# Patient Record
Sex: MALE | Race: WHITE | Employment: OTHER | URBAN - METROPOLITAN AREA
[De-identification: names, ages, dates, MRNs, and addresses within clinical notes are randomized per-mention and may not be internally consistent; named-entity substitution may affect disease eponyms.]

---

## 2017-01-16 ENCOUNTER — HOSPITAL ENCOUNTER (OUTPATIENT)
Dept: CT IMAGING | Age: 67
Discharge: HOME | End: 2017-01-16
Attending: FAMILY MEDICINE
Payer: SELF-PAY

## 2017-01-16 DIAGNOSIS — E78.2 MIXED HYPERLIPIDEMIA: ICD-10-CM

## 2017-01-16 PROCEDURE — 75571 CT HRT W/O DYE W/CA TEST: CPT

## 2018-01-19 PROBLEM — D68.51 FACTOR 5 LEIDEN MUTATION, HETEROZYGOUS (HCC): Status: ACTIVE | Noted: 2018-01-19

## 2018-10-20 ENCOUNTER — HOSPITAL ENCOUNTER (EMERGENCY)
Age: 68
Discharge: HOME | End: 2018-10-20
Attending: EMERGENCY MEDICINE
Payer: MEDICARE

## 2018-10-20 ENCOUNTER — APPOINTMENT (OUTPATIENT)
Dept: ULTRASOUND IMAGING | Age: 68
End: 2018-10-20
Attending: EMERGENCY MEDICINE
Payer: MEDICARE

## 2018-10-20 VITALS
HEIGHT: 72 IN | DIASTOLIC BLOOD PRESSURE: 82 MMHG | SYSTOLIC BLOOD PRESSURE: 143 MMHG | OXYGEN SATURATION: 94 % | WEIGHT: 301.3 LBS | HEART RATE: 100 BPM | TEMPERATURE: 97.5 F | RESPIRATION RATE: 16 BRPM | BODY MASS INDEX: 40.81 KG/M2

## 2018-10-20 DIAGNOSIS — I82.411 ACUTE DEEP VEIN THROMBOSIS (DVT) OF FEMORAL VEIN OF RIGHT LOWER EXTREMITY (HCC): Primary | ICD-10-CM

## 2018-10-20 PROCEDURE — 93971 EXTREMITY STUDY: CPT

## 2018-10-20 PROCEDURE — 99283 EMERGENCY DEPT VISIT LOW MDM: CPT | Performed by: EMERGENCY MEDICINE

## 2018-10-20 PROCEDURE — 74011250637 HC RX REV CODE- 250/637: Performed by: EMERGENCY MEDICINE

## 2018-10-20 RX ORDER — HYDROCODONE BITARTRATE AND ACETAMINOPHEN 5; 325 MG/1; MG/1
1 TABLET ORAL
Qty: 12 TAB | Refills: 0 | Status: SHIPPED | OUTPATIENT
Start: 2018-10-20 | End: 2019-02-07

## 2018-10-20 RX ADMIN — RIVAROXABAN 15 MG: 15 TABLET, FILM COATED ORAL at 14:54

## 2018-10-20 NOTE — ED NOTES
I have reviewed discharge instructions with the patient. The patient verbalized understanding. Patient left ED via Discharge Method: ambulatory to Home with self. Opportunity for questions and clarification provided. Patient given 2 scripts. To continue your aftercare when you leave the hospital, you may receive an automated call from our care team to check in on how you are doing. This is a free service and part of our promise to provide the best care and service to meet your aftercare needs.  If you have questions, or wish to unsubscribe from this service please call [phone]. Thank you for Choosing our [facility] Emergency Department.

## 2018-10-20 NOTE — ED PROVIDER NOTES
HPI: 
76 M, here with right leg swelling and pain since Thursday. Leiden factor V. Had DVT after surgery in 2002. No anticoagulant at this time. No fall. No trauma. No fever. No abdominal pain, back pain, chest pain, shortness of breath, cough, hemoptysis. ROS Constitutional: No fever, no chills Skin: no rash Eye: No vision changes ENMT: No sore throat Respiratory: No shortness of breath, no cough Cardiovascular: No chest pain, no palpitations Gastrointestinal: No vomiting, no nausea, no diarrhea, no abdominal pain : No dysuria MSK: No back pain, no joint pain Neuro: No headache, no change in mental status, no numbness, no tingling, no weakness All other review of systems positive per history of present illness and the above otherwise negative or noncontributory. Visit Vitals /77 (BP 1 Location: Left arm, BP Patient Position: At rest;Sitting) Pulse (!) 110 Temp 97.5 °F (36.4 °C) Resp 16 Ht 6' (1.829 m) Wt 136.7 kg (301 lb 4.8 oz) SpO2 96% BMI 40.86 kg/m² Past Medical History:  
Diagnosis Date  Allergic sinusitis  Cholelithiasis  CKD (chronic kidney disease)  Degenerative disc disease, cervical   
 Diabetes mellitus type II   
 Elevated PSA  GERD (gastroesophageal reflux disease)  History of prostate cancer 2002  Hx of malignant neoplasm of soft tissue 2002  Hx of syncope  Hyperlipidemia  Hypertension  Migraine  Multiple nevi  Obesity, morbid ([address]  Personal history of colonic polyps 2015  
 adenomas  Thromboembolus ([address] right leg 2002  Ulnar nerve compression Past Surgical History:  
Procedure Laterality Date  HX COLONOSCOPY  last 3/24/15 [facility]--two asc TA, three desc hyperplastic, one sigmoid TA--3 year recall  HX LAP CHOLECYSTECTOMY  1980's  HX MALIGNANT SKIN LESION EXCISION  2002  
 sarcoma [address] Relocate nerve in right elbow  HX PROSTATECTOMY  2002  
 not removed, radiation seeds Prior to Admission Medications Prescriptions Last Dose Informant Patient Reported? Taking? Cholecalciferol, Vitamin D3, 2,000 unit cap   Yes No  
Sig: Take 2 Caps by mouth daily. SITagliptin (JANUVIA) 100 mg tablet   No No  
Sig: TAKE 1 TABLET DAILY  
VESICARE 5 mg tablet   Yes No  
Sig: TAKE 1 TABLET BY MOUTH EVERY DAY  
aspirin (ASPIRIN) 325 mg tablet   Yes No  
Sig: Take 325 mg by mouth daily. atorvastatin (LIPITOR) 40 mg tablet   No No  
Sig: TAKE 1 TABLET DAILY  
colesevelam (WELCHOL) 3.75 gram pwpk powder packet   No No  
Sig: MIX AND DRINK ONE PACKET DAILY  
dapagliflozin (FARXIGA) 10 mg tab tablet   No No  
Sig: TAKE 1 TABLET BY MOUTH EVERY DAY  
ezetimibe (ZETIA) 10 mg tablet   No No  
Sig: TAKE 1 TABLET DAILY  
fenofibric acid (TRILIPIX ER) 135 mg capsule   No No  
Sig: TAKE 1 CAPSULE DAILY  
fexofenadine (ALLEGRA) 180 mg tablet   Yes No  
Sig: Take  by mouth daily. glucosamine/chondr mejia A sod (OSTEO BI-FLEX PO)   Yes No  
Sig: Take 1 Tab by mouth daily. magnesium 100 mg cap   Yes No  
Sig: Take 500 mg by mouth daily. metFORMIN (GLUCOPHAGE) 500 mg tablet   No No  
Sig: TAKE 2 TABLETS TWICE A DAY  
mometasone (NASONEX) 50 mcg/actuation nasal spray   Yes No  
Sig: as directed. montelukast (SINGULAIR) 10 mg tablet   No No  
Sig: Take 1 Tab by mouth daily. niacin (NIASPAN) 1,000 mg Tb24 tab   No No  
Sig: TAKE 3 TABLETS AT SUPPER  
olmesartan (BENICAR) 40 mg tablet   No No  
Sig: Take 1 Tab by mouth daily for 90 days. TAKE 1 TABLET DAILY  
omega-3 fatty acids-vitamin e (FISH OIL) 1,000 mg cap   Yes No  
Sig: Take 3 Caps by mouth. omeprazole (PRILOSEC) 20 mg capsule   No No  
Sig: Take 1 Cap by mouth daily for 90 days. Facility-Administered Medications: None Adult Exam  
General: alert, no acute distress Head: normocephalic, atraumatic ENT: moist mucous membranes Neck: supple, non-tender; full range of motion Cardiovascular: regular rate and rhythm, normal peripheral perfusion, no edema Respiratory:  normal respirations; no wheezing, rales or rhonchi Gastrointestinal: soft, non-tender; no rebound or guarding, no peritoneal signs, no distension Back: non-tender, full range of motion Musculoskeletal: normal range of motion, normal strength, no gross deformities RT LE - equal distal pulses. No cellulitis. + edema in RT > LT. Tenderness to palpation at the RT medial thigh. Neurological: alert and oriented x 4, no gross focal deficits; normal speech Psychiatric: cooperative; appropriate mood and affect MDM: Will obtain US of LE for evaluation of DVT. No chest pain or shortness of breath. Low susp for PE. Abdomen soft and non-tender. No hernia palpated. 2:49 PM 
+ DVT in RT LE. Unprovoked. Spoke with Dr. Cat Mojica about case. Need to see patient in office for further work up. Will start on Xarelto 15 mg BID x 21 days. He has a Heme/onc that he would like to see instead. He will call them on Monday. I will also give info to Dr. Ozzie Cogan office. Stable for discharge. Minimal pain at this time. Duplex Lower Ext Venous Right Result Date: 10/20/2018 RIGHT LOWER EXTREMITY DOPPLER ULTRASOUND 10/20/2018 HISTORY:    Leg swelling, pain, DVT suspected; prior DVT 2002. Not on anticoagulant  ; 3 days of right leg swelling and pain Technique: Sonographic imaging of the deep veins of the left leg was performed FINDINGS: Occlusive deep venous thrombus is present in the right common femoral vein and right femoral vein. Nonocclusive deep venous thrombus is present in the right popliteal vein. Occlusive DVT extends into the right posterior tibial veins and peroneal veins. IMPRESSION: Extensive right lower extremity DVT. Dragon voice recognition software was used to create this note.  Although the note has been reviewed and corrected where necessary, additional errors may have been overlooked and remain in the text.

## 2019-02-13 ENCOUNTER — HOSPITAL ENCOUNTER (OUTPATIENT)
Dept: ULTRASOUND IMAGING | Age: 69
Discharge: HOME | End: 2019-02-13
Attending: INTERNAL MEDICINE
Payer: MEDICARE

## 2019-02-13 DIAGNOSIS — Z13.6 ENCOUNTER FOR ABDOMINAL AORTIC ANEURYSM (AAA) SCREENING: ICD-10-CM

## 2019-02-13 PROCEDURE — 93978 VASCULAR STUDY: CPT

## 2019-08-14 PROBLEM — D12.5 BENIGN NEOPLASM OF SIGMOID COLON: Status: ACTIVE | Noted: 2019-08-14

## 2019-08-14 PROBLEM — K21.00 GASTRO-ESOPHAGEAL REFLUX DISEASE WITH ESOPHAGITIS: Status: ACTIVE | Noted: 2019-08-14

## 2019-08-17 NOTE — H&P (VIEW-ONLY)
aDate: 2019      Name: Debra Patel      MRN: [id number]       : 1950       Age: 76 y.o. Sex: male            CC: No chief complaint on file. HPI:     Debra Patel is a 76 y.o. male who presents for evaluation of a soft tissue mass as an urgent referral from Dr. Hair Mejia. The patient has a soft tissue mass of the back which has \"flared up. \" Dr. Hair Mejia started him on Cipro when she saw him on 8/15/19. The patient has a factor V Leiden mutation for which he is on Xarelto. The lesion on the left upper back has been present for \"3-4 weeks. \" It is draining and \"messy. \" It is painful. This is the fourth recurrence of similar lesions in the same area. Previously the lesions have been excised in a surgeon's office. No fever or chills. PMH:    Past Medical History:   Diagnosis Date    Allergic sinusitis     Cholelithiasis     CKD (chronic kidney disease)     Degenerative disc disease, cervical     Diabetes mellitus type II     Elevated PSA     GERD (gastroesophageal reflux disease)     History of prostate cancer 2002    Hx of malignant neoplasm of soft tissue 2002    Hx of syncope     Hyperlipidemia     Hypertension     Migraine     Multiple nevi     Obesity, morbid ([address]     Personal history of colonic polyps     adenomas    [facility]     right leg 2002    Ulnar nerve compression        PSH:    Past Surgical History:   Procedure Laterality Date    HX COLONOSCOPY  last 3/24/15    [facility]--two asc TA, three desc hyperplastic, one sigmoid TA--3 year recall    HX LAP CHOLECYSTECTOMY      HX MALIGNANT SKIN LESION EXCISION      sarcoma    HX ORTHOPAEDIC  1985    Relocate nerve in right elbow    HX PROSTATECTOMY  2002    not removed, radiation seeds       MEDS:    Current Outpatient Medications   Medication Sig    amLODIPine-valsartan (EXFORGE) 5-320 mg per tablet Take 1 Tab by mouth daily.  Magnesium Oxide 500 mg cap Take 1 Cap by mouth daily.     multivitamin (ONE A DAY) tablet take 1 tablet by oral route  every day with food    omeprazole (PRILOSEC) 40 mg capsule Take 1 Cap by mouth daily.  atorvastatin (LIPITOR) 40 mg tablet take 1 tablet by oral route  every day    cholecalciferol, vitamin D3, 4,000 unit cap Take 1 Cap by mouth daily.  [facility]) 3.75 gram pwpk powder packet Take  by mouth.  dapagliflozin (FARXIGA) 10 mg tab tablet take 1 tablet by oral route  every day in the morning    ezetimibe (ZETIA) 10 mg tablet take 1 tablet by oral route  every day    fenofibric acid (TRILIPIX) 135 mg capsule take 1 capsule by oral route  every day    fexofenadine (ALLEGRA) 180 mg tablet take 1 tablet by oral route  every day as needed    montelukast (SINGULAIR) 10 mg tablet take 1 tablet by oral route  every day in the evening as needed    niacin 1,000 mg TbER take 1 tablet by oral route 3 times every day    SITagliptin (JANUVIA) 100 mg tablet take 1 tablet by oral route  every day    XARELTO 20 mg tab tablet Take 1 tablet by oral route every day with the evening meal    omega 3-DHA-EPA-fish oil (FISH OIL) 1,000 mg (120 mg-180 mg) capsule Take 3 Caps by mouth daily.  ciprofloxacin HCl (CIPRO) 500 mg tablet Take 1 Tab by mouth two (2) times a day for 7 days.  metFORMIN (GLUCOPHAGE) 500 mg tablet TAKE 2 TABLETS TWICE A DAY    VESICARE 5 mg tablet TAKE 1 TABLET BY MOUTH EVERY DAY    glucosamine/chondr mejia A sod (OSTEO BI-FLEX PO) Take 1 Tab by mouth daily.  aspirin (ASPIRIN) 325 mg tablet Take 325 mg by mouth daily. No current facility-administered medications for this visit.         ALLERGIES:      No Known Allergies    SH:    Social History     Tobacco Use    Smoking status: Former Smoker     Packs/day: 1.00     Years: 10.00     Pack years: 10.00     Last attempt to quit: 1984     Years since quittin.3    Smokeless tobacco: Never Used   Substance Use Topics    Alcohol use: Yes     Comment: rarely    Drug use: No       FH:    Family History   Problem Relation Age of Onset    Cancer Father         lung    Heart Attack Mother     Heart Disease Mother     Cancer Paternal Grandmother     Cancer Paternal Grandfather     Lung Disease Paternal Grandfather        ROS: The patient has no difficulty with chest pain or shortness of breath. No fever or chills. Comprehensive 13 point review of systems was otherwise unremarkable except as noted above. Physical Exam:     There were no vitals taken for this visit. General: Alert, oriented, obese white male in no acute distress. Eyes: Sclera are clear. Conjunctiva and lids within normal limits. No icterus. Ears and Nose: no gross deformities to visual inspection, gross hearing intact  Neck: Supple, trachea midline. No lymphadenopathy. Resp: Breathing is  non-labored. Lungs clear to auscultation without wheezing or rhonchi   Back: large, open, infected left upper back soft tissue mass. This is likely a ruptured, inflamed, infected epidermal inclusion cyst. There is some surrounding cellulitis. The lesion is draining sebaceous material. The diameter is approximately 10 cm's. CV: RRR. No murmurs, rubs or gallops appreciated. Abd: soft non-tender and non-distended without peritoneal signs. +bs    Psych:  Mood and affect appropriate. Short-term memory and understanding intact      Assessment/Plan:  Mildred Pittman is a 76 y.o. male who has signs and symptoms consistent with a soft tissue mass of the left upper back. 1.  Hold Xarelto. May continue  mg    2. Excision of a left upper back soft tissue mass in the operating room. 3.  I went through the risks of bleeding, infection, anesthesia, hematoma/seroma formation and possible recurrence of the lesion. I said it may be necessary to place a drain. It may be necessary to leave the wound open, if badly infected.     Solange Oquendo MD      [facility]   8/17/2019  11:42 AM

## 2019-08-21 ENCOUNTER — HOSPITAL ENCOUNTER (OUTPATIENT)
Dept: SURGERY | Age: 69
Discharge: HOME | End: 2019-08-21

## 2019-08-21 VITALS — WEIGHT: 310 LBS | BODY MASS INDEX: 41.99 KG/M2 | HEIGHT: 72 IN

## 2019-08-21 NOTE — PERIOP NOTES
Patient verified name and . Order for consent found in EHR and matches case posting; patient verifies procedure. Type 1B surgery, phone assessment complete. Orders received. Labs per surgeon: none  Labs per anesthesia protocol: none; CBC and CMP collected 19- results in EMR for anesthesia reference    Patient answered medical/surgical history questions at their best of ability. All prior to admission medications documented in [facility]. Patient instructed to take the following medications the day of surgery according to anesthesia guidelines with a small sip of water: asa 325mg, welchol, omeprazole, vesicare. Hold all vitamins 7 days prior to surgery and NSAIDS 5 days prior to surgery. Prescription meds to hold: exforge DOS, farxiga DOS, zetia DOS, trilipix DOS, [place] DOS, metformin DOS. Pt states surgeon instructed him to hold his xarelto prior to surgery but could remain taking his ASA. Patient instructed on the following:  Arrive at A Entrance, time of arrival to be called the day before by 1700  NPO after midnight including gum, mints, and ice chips  Responsible adult must drive patient to the hospital, stay during surgery, and patient will need supervision 24 hours after anesthesia  Use antibacterial soap in shower the night before surgery and on the morning of surgery  All piercings must be removed prior to arrival.    Leave all valuables (money and jewelry) at home but bring insurance card and ID on DOS. Do not wear make-up, nail polish, lotions, cologne, perfumes, powders, or oil on skin. Patient teach back successful and patient demonstrates knowledge of instruction.

## 2019-08-22 ENCOUNTER — ANESTHESIA EVENT (OUTPATIENT)
Dept: SURGERY | Age: 69
End: 2019-08-22
Payer: MEDICARE

## 2019-08-23 ENCOUNTER — HOSPITAL ENCOUNTER (OUTPATIENT)
Age: 69
Setting detail: OUTPATIENT SURGERY
Discharge: HOME | End: 2019-08-23
Attending: SURGERY | Admitting: SURGERY
Payer: MEDICARE

## 2019-08-23 ENCOUNTER — ANESTHESIA (OUTPATIENT)
Dept: SURGERY | Age: 69
End: 2019-08-23
Payer: MEDICARE

## 2019-08-23 VITALS
HEIGHT: 72 IN | SYSTOLIC BLOOD PRESSURE: 119 MMHG | DIASTOLIC BLOOD PRESSURE: 64 MMHG | RESPIRATION RATE: 18 BRPM | BODY MASS INDEX: 41.87 KG/M2 | HEART RATE: 99 BPM | OXYGEN SATURATION: 92 % | TEMPERATURE: 97.5 F | WEIGHT: 309.13 LBS

## 2019-08-23 DIAGNOSIS — M79.89 SOFT TISSUE MASS: Primary | ICD-10-CM

## 2019-08-23 LAB — GLUCOSE BLD STRIP.AUTO-MCNC: 212 MG/DL (ref 65–100)

## 2019-08-23 PROCEDURE — 74011250636 HC RX REV CODE- 250/636

## 2019-08-23 PROCEDURE — 74011250636 HC RX REV CODE- 250/636: Performed by: ANESTHESIOLOGY

## 2019-08-23 PROCEDURE — 77030018836 HC SOL IRR NACL ICUM -A: Performed by: SURGERY

## 2019-08-23 PROCEDURE — 76060000033 HC ANESTHESIA 1 TO 1.5 HR: Performed by: SURGERY

## 2019-08-23 PROCEDURE — 77030008462 HC STPLR SKN PROX J&J -A: Performed by: SURGERY

## 2019-08-23 PROCEDURE — 76010000161 HC OR TIME 1 TO 1.5 HR INTENSV-TIER 1: Performed by: SURGERY

## 2019-08-23 PROCEDURE — 76210000020 HC REC RM PH II FIRST 0.5 HR: Performed by: SURGERY

## 2019-08-23 PROCEDURE — 74011250636 HC RX REV CODE- 250/636: Performed by: SURGERY

## 2019-08-23 PROCEDURE — 88305 TISSUE EXAM BY PATHOLOGIST: CPT

## 2019-08-23 PROCEDURE — 82962 GLUCOSE BLOOD TEST: CPT

## 2019-08-23 PROCEDURE — 77030021678 HC GLIDESCP STAT DISP VERT -B: Performed by: ANESTHESIOLOGY

## 2019-08-23 PROCEDURE — 77030039425 HC BLD LARYNG TRULITE DISP TELE -A: Performed by: ANESTHESIOLOGY

## 2019-08-23 PROCEDURE — 74011000250 HC RX REV CODE- 250

## 2019-08-23 PROCEDURE — 77030012411 HC DRN WND CARD -A: Performed by: SURGERY

## 2019-08-23 PROCEDURE — 77030037088 HC TUBE ENDOTRACH ORAL NSL COVD-A: Performed by: ANESTHESIOLOGY

## 2019-08-23 PROCEDURE — 74011250637 HC RX REV CODE- 250/637: Performed by: ANESTHESIOLOGY

## 2019-08-23 PROCEDURE — 76210000063 HC OR PH I REC FIRST 0.5 HR: Performed by: SURGERY

## 2019-08-23 RX ORDER — SODIUM CHLORIDE 0.9 % (FLUSH) 0.9 %
5-40 SYRINGE (ML) INJECTION AS NEEDED
Status: DISCONTINUED | OUTPATIENT
Start: 2019-08-23 | End: 2019-08-23 | Stop reason: HOSPADM

## 2019-08-23 RX ORDER — OXYCODONE AND ACETAMINOPHEN 5; 325 MG/1; MG/1
1-2 TABLET ORAL
Qty: 30 TAB | Refills: 0 | Status: SHIPPED | OUTPATIENT
Start: 2019-08-23 | End: 2019-08-26

## 2019-08-23 RX ORDER — PROPOFOL 10 MG/ML
INJECTION, EMULSION INTRAVENOUS AS NEEDED
Status: DISCONTINUED | OUTPATIENT
Start: 2019-08-23 | End: 2019-08-23 | Stop reason: HOSPADM

## 2019-08-23 RX ORDER — DIPHENHYDRAMINE HYDROCHLORIDE 50 MG/ML
12.5 INJECTION, SOLUTION INTRAMUSCULAR; INTRAVENOUS ONCE
Status: DISCONTINUED | OUTPATIENT
Start: 2019-08-23 | End: 2019-08-23 | Stop reason: HOSPADM

## 2019-08-23 RX ORDER — HYDROMORPHONE HYDROCHLORIDE 2 MG/ML
0.5 INJECTION, SOLUTION INTRAMUSCULAR; INTRAVENOUS; SUBCUTANEOUS
Status: DISCONTINUED | OUTPATIENT
Start: 2019-08-23 | End: 2019-08-23 | Stop reason: HOSPADM

## 2019-08-23 RX ORDER — MIDAZOLAM HYDROCHLORIDE 1 MG/ML
2 INJECTION, SOLUTION INTRAMUSCULAR; INTRAVENOUS
Status: DISCONTINUED | OUTPATIENT
Start: 2019-08-23 | End: 2019-08-23 | Stop reason: HOSPADM

## 2019-08-23 RX ORDER — OXYCODONE HYDROCHLORIDE 5 MG/1
5 TABLET ORAL
Status: DISCONTINUED | OUTPATIENT
Start: 2019-08-23 | End: 2019-08-23 | Stop reason: HOSPADM

## 2019-08-23 RX ORDER — SODIUM CHLORIDE, SODIUM LACTATE, POTASSIUM CHLORIDE, CALCIUM CHLORIDE 600; 310; 30; 20 MG/100ML; MG/100ML; MG/100ML; MG/100ML
100 INJECTION, SOLUTION INTRAVENOUS CONTINUOUS
Status: DISCONTINUED | OUTPATIENT
Start: 2019-08-23 | End: 2019-08-23 | Stop reason: HOSPADM

## 2019-08-23 RX ORDER — ONDANSETRON 2 MG/ML
INJECTION INTRAMUSCULAR; INTRAVENOUS AS NEEDED
Status: DISCONTINUED | OUTPATIENT
Start: 2019-08-23 | End: 2019-08-23 | Stop reason: HOSPADM

## 2019-08-23 RX ORDER — OXYCODONE AND ACETAMINOPHEN 5; 325 MG/1; MG/1
1 TABLET ORAL AS NEEDED
Status: DISCONTINUED | OUTPATIENT
Start: 2019-08-23 | End: 2019-08-23 | Stop reason: HOSPADM

## 2019-08-23 RX ORDER — SODIUM CHLORIDE 9 MG/ML
50 INJECTION, SOLUTION INTRAVENOUS CONTINUOUS
Status: DISCONTINUED | OUTPATIENT
Start: 2019-08-23 | End: 2019-08-23 | Stop reason: HOSPADM

## 2019-08-23 RX ORDER — EPHEDRINE SULFATE 50 MG/ML
INJECTION, SOLUTION INTRAVENOUS AS NEEDED
Status: DISCONTINUED | OUTPATIENT
Start: 2019-08-23 | End: 2019-08-23 | Stop reason: HOSPADM

## 2019-08-23 RX ORDER — SULFAMETHOXAZOLE AND TRIMETHOPRIM 800; 160 MG/1; MG/1
1 TABLET ORAL 2 TIMES DAILY
Qty: 14 TAB | Refills: 0 | Status: SHIPPED | OUTPATIENT
Start: 2019-08-23 | End: 2019-08-29 | Stop reason: SDUPTHER

## 2019-08-23 RX ORDER — FENTANYL CITRATE 50 UG/ML
25 INJECTION, SOLUTION INTRAMUSCULAR; INTRAVENOUS ONCE
Status: DISCONTINUED | OUTPATIENT
Start: 2019-08-23 | End: 2019-08-23 | Stop reason: HOSPADM

## 2019-08-23 RX ORDER — MIDAZOLAM HYDROCHLORIDE 1 MG/ML
2 INJECTION, SOLUTION INTRAMUSCULAR; INTRAVENOUS ONCE
Status: DISCONTINUED | OUTPATIENT
Start: 2019-08-23 | End: 2019-08-23 | Stop reason: HOSPADM

## 2019-08-23 RX ORDER — LIDOCAINE HYDROCHLORIDE 20 MG/ML
INJECTION, SOLUTION EPIDURAL; INFILTRATION; INTRACAUDAL; PERINEURAL AS NEEDED
Status: DISCONTINUED | OUTPATIENT
Start: 2019-08-23 | End: 2019-08-23 | Stop reason: HOSPADM

## 2019-08-23 RX ORDER — NEOSTIGMINE METHYLSULFATE 1 MG/ML
INJECTION INTRAVENOUS AS NEEDED
Status: DISCONTINUED | OUTPATIENT
Start: 2019-08-23 | End: 2019-08-23 | Stop reason: HOSPADM

## 2019-08-23 RX ORDER — SODIUM CHLORIDE 0.9 % (FLUSH) 0.9 %
5-40 SYRINGE (ML) INJECTION EVERY 8 HOURS
Status: DISCONTINUED | OUTPATIENT
Start: 2019-08-23 | End: 2019-08-23 | Stop reason: HOSPADM

## 2019-08-23 RX ORDER — GLYCOPYRROLATE 0.2 MG/ML
INJECTION INTRAMUSCULAR; INTRAVENOUS AS NEEDED
Status: DISCONTINUED | OUTPATIENT
Start: 2019-08-23 | End: 2019-08-23 | Stop reason: HOSPADM

## 2019-08-23 RX ORDER — ROCURONIUM BROMIDE 10 MG/ML
INJECTION, SOLUTION INTRAVENOUS AS NEEDED
Status: DISCONTINUED | OUTPATIENT
Start: 2019-08-23 | End: 2019-08-23 | Stop reason: HOSPADM

## 2019-08-23 RX ORDER — SUCCINYLCHOLINE CHLORIDE 20 MG/ML
INJECTION INTRAMUSCULAR; INTRAVENOUS AS NEEDED
Status: DISCONTINUED | OUTPATIENT
Start: 2019-08-23 | End: 2019-08-23 | Stop reason: HOSPADM

## 2019-08-23 RX ORDER — FAMOTIDINE 20 MG/1
20 TABLET, FILM COATED ORAL ONCE
Status: COMPLETED | OUTPATIENT
Start: 2019-08-23 | End: 2019-08-23

## 2019-08-23 RX ORDER — FENTANYL CITRATE 50 UG/ML
INJECTION, SOLUTION INTRAMUSCULAR; INTRAVENOUS AS NEEDED
Status: DISCONTINUED | OUTPATIENT
Start: 2019-08-23 | End: 2019-08-23 | Stop reason: HOSPADM

## 2019-08-23 RX ORDER — LIDOCAINE HYDROCHLORIDE 10 MG/ML
0.1 INJECTION INFILTRATION; PERINEURAL AS NEEDED
Status: DISCONTINUED | OUTPATIENT
Start: 2019-08-23 | End: 2019-08-23 | Stop reason: HOSPADM

## 2019-08-23 RX ADMIN — FENTANYL CITRATE 50 MCG: 50 INJECTION, SOLUTION INTRAMUSCULAR; INTRAVENOUS at 11:11

## 2019-08-23 RX ADMIN — Medication 3 G: at 10:53

## 2019-08-23 RX ADMIN — ROCURONIUM BROMIDE 5 MG: 10 INJECTION, SOLUTION INTRAVENOUS at 11:02

## 2019-08-23 RX ADMIN — PROPOFOL 150 MG: 10 INJECTION, EMULSION INTRAVENOUS at 11:27

## 2019-08-23 RX ADMIN — FENTANYL CITRATE 50 MCG: 50 INJECTION, SOLUTION INTRAMUSCULAR; INTRAVENOUS at 11:38

## 2019-08-23 RX ADMIN — SUCCINYLCHOLINE CHLORIDE 200 MG: 20 INJECTION INTRAMUSCULAR; INTRAVENOUS at 11:02

## 2019-08-23 RX ADMIN — NEOSTIGMINE METHYLSULFATE 3 MG: 1 INJECTION INTRAVENOUS at 11:52

## 2019-08-23 RX ADMIN — SODIUM CHLORIDE, SODIUM LACTATE, POTASSIUM CHLORIDE, AND CALCIUM CHLORIDE: 600; 310; 30; 20 INJECTION, SOLUTION INTRAVENOUS at 12:00

## 2019-08-23 RX ADMIN — SODIUM CHLORIDE, SODIUM LACTATE, POTASSIUM CHLORIDE, AND CALCIUM CHLORIDE 100 ML/HR: 600; 310; 30; 20 INJECTION, SOLUTION INTRAVENOUS at 10:21

## 2019-08-23 RX ADMIN — GLYCOPYRROLATE 0.4 MG: 0.2 INJECTION INTRAMUSCULAR; INTRAVENOUS at 11:52

## 2019-08-23 RX ADMIN — OXYCODONE HYDROCHLORIDE AND ACETAMINOPHEN 1 TABLET: 5; 325 TABLET ORAL at 12:35

## 2019-08-23 RX ADMIN — EPHEDRINE SULFATE 10 MG: 50 INJECTION, SOLUTION INTRAVENOUS at 11:21

## 2019-08-23 RX ADMIN — ONDANSETRON 4 MG: 2 INJECTION INTRAMUSCULAR; INTRAVENOUS at 11:15

## 2019-08-23 RX ADMIN — FENTANYL CITRATE 50 MCG: 50 INJECTION, SOLUTION INTRAMUSCULAR; INTRAVENOUS at 11:02

## 2019-08-23 RX ADMIN — LIDOCAINE HYDROCHLORIDE 100 MG: 20 INJECTION, SOLUTION EPIDURAL; INFILTRATION; INTRACAUDAL; PERINEURAL at 11:02

## 2019-08-23 RX ADMIN — ROCURONIUM BROMIDE 20 MG: 10 INJECTION, SOLUTION INTRAVENOUS at 11:27

## 2019-08-23 RX ADMIN — FENTANYL CITRATE 50 MCG: 50 INJECTION, SOLUTION INTRAMUSCULAR; INTRAVENOUS at 11:27

## 2019-08-23 RX ADMIN — FAMOTIDINE 20 MG: 20 TABLET, FILM COATED ORAL at 10:20

## 2019-08-23 RX ADMIN — PROPOFOL 200 MG: 10 INJECTION, EMULSION INTRAVENOUS at 11:02

## 2019-08-23 NOTE — INTERVAL H&P NOTE
H&P Update:  Kinga Chiu was seen and examined. History and physical has been reviewed. The patient has been examined.  There have been no significant clinical changes since the completion of the originally dated History and Physical.

## 2019-08-23 NOTE — ANESTHESIA POSTPROCEDURE EVALUATION
Procedure(s):  EXCISION SOFT TISSUE MASS LEFT UPPER BACK.     general    Anesthesia Post Evaluation      Multimodal analgesia: multimodal analgesia used between 6 hours prior to anesthesia start to PACU discharge  Patient location during evaluation: bedside  Patient participation: complete - patient participated  Level of consciousness: awake  Pain management: adequate  Airway patency: patent  Anesthetic complications: no  Cardiovascular status: acceptable and stable  Respiratory status: acceptable and room air  Hydration status: acceptable  Post anesthesia nausea and vomiting:  none      Vitals Value Taken Time   /67 8/23/2019 12:52 PM   Temp 36.4 °C (97.5 °F) 8/23/2019 12:09 PM   Pulse 100 8/23/2019 12:52 PM   Resp 18 8/23/2019 12:52 PM   SpO2 96 % 8/23/2019 12:52 PM

## 2019-08-23 NOTE — ANESTHESIA PREPROCEDURE EVALUATION
Relevant Problems   No relevant active problems       Anesthetic History   No history of anesthetic complications            Review of Systems / Medical History  Patient summary reviewed and pertinent labs reviewed    Pulmonary  Within defined limits            Pertinent negatives: Smoker: ex.     Neuro/Psych         Headaches     Cardiovascular    Hypertension: well controlled          Hyperlipidemia  Pertinent negatives: No past MI  Exercise tolerance: >4 METS     GI/Hepatic/Renal     GERD: well controlled    Renal disease: CRI       Endo/Other    Diabetes: well controlled, type 2    Morbid obesity, arthritis and cancer (prostate)     Other Findings   Comments: DDD neck  DVT 2002 and 2018--on xarelto, none for 3 days           Physical Exam    Airway  Mallampati: II  TM Distance: 4 - 6 cm  Neck ROM: normal range of motion   Mouth opening: Normal    Comments: Heavily bearded Cardiovascular  Regular rate and rhythm,  S1 and S2 normal,  no murmur, click, rub, or gallop  Rhythm: regular  Rate: normal         Dental    Dentition: Caps/crowns  Comments: Temporary crown lower right   Pulmonary  Breath sounds clear to auscultation               Abdominal  Abdominal exam normal       Other Findings            Anesthetic Plan    ASA: 3  Anesthesia type: general          Induction: Intravenous  Anesthetic plan and risks discussed with: Patient

## 2019-08-23 NOTE — DISCHARGE INSTRUCTIONS
1. Diet as tolerated except for a  low fat diet after laparoscopic cholecystectomy. 2. Showering is allowed, but no tub baths, hot tubs or swimming. 3. Drainage is common from the wounds. Change the dressings as needed. Call our office if the wounds become reddened, tender, feel warm to the touch or pus starts to drain from the wound. 4. Take prescribed pain medication as directed, usually Percocet, Norco, Ultram or Dilaudid. Take over the counter medication for minor pain. 5. Ice may be applied intermittently to the surgical site or sites. 6. Call or office, [phone], if problems arise. 7. Follow up in the office at the assigned time. 8. Resume all medications as taken per surgery, unless specifically instructed not to take certain ones. 9. No lifting more than 25 pounds until told otherwise. 10. Driving is allowed 3 days after surgery as long as you feel comfortable enough to drive and have not taken any prescription pain medication prior to driving. 11. Leave packing in place until seen in the office on 8/26/19.    12. Resume Xarelto on 8/25/19.

## 2019-08-23 NOTE — BRIEF OP NOTE
BRIEF OPERATIVE NOTE    Date of Procedure: 8/23/2019   Preoperative Diagnosis: LARGE LEFT UPPER BACK SOFT TISSUE MASS AND MORBID OBESITY WITH A BMI OF 42  Postoperative Diagnosis: SAME WITH A VERY LARGE, INFECTED EPIDERMAL INCLUSION CYST FOUND. Procedure(s):  EXCISION  LEFT UPPER BACK SOFT TISSUE MASS MEASURING 10 CM X 6 CM X 6 CM  Surgeon(s) and Role:     Austin Owens MD - Primary         Surgical Assistant: None    Surgical Staff:  Circ-1: Uma Osullivan RN  Scrub Tech-1: Benny Alamo  Scrub Tech-2: Mukul MCNAMARA  Event Time In Time Out   Incision Start 1124    Incision Close       Anesthesia: General   Estimated Blood Loss: 50 cc's  Specimens:   ID Type Source Tests Collected by Time Destination   1 : soft tissue mass left upper back Preservative   Alejandro Chowdhury MD 8/23/2019 1129 Pathology      Findings: See dictated note. Wound too deep and infected to close primarily. Complications: None.   Implants: * No implants in log *

## 2019-08-23 NOTE — ADDENDUM NOTE
Addendum  created 08/23/19 1309 by Praveen Wallace MD    Attestation recorded in [facility] filed

## 2019-08-24 NOTE — OP NOTES
[address] [address]  OPERATIVE REPORT    Name:  Donnie Vee  MR#:  [id number]  :  1950  ACCOUNT #:  [de-identified]  DATE OF SERVICE:  2019    PREOPERATIVE DIAGNOSES:  1. Left upper back soft tissue mass. 2.  Morbid obesity with a body mass index of 42. POSTOPERATIVE DIAGNOSES:  1. Left upper back soft tissue mass. 2.  Morbid obesity with a body mass index of 42 with a large infected epidermal inclusion cyst encountered as left upper back soft tissue mass. PROCEDURE PERFORMED:  Excision of a left upper back soft tissue mass measuring 10 cm x 6 cm x 6 cm. SURGEON:  Olinda Rajan. Nia Cline MD    ASSISTANT:  None. ANESTHESIA:  General endotracheal anesthesia. COMPLICATIONS:   None. SPECIMENS REMOVED:  This large epidermal inclusion cyst was excised in two pieces and sent to Pathology for evaluation. IMPLANTS:  He had Betadine-soaked Kerlix placed in the wound as it was too large and infected to close primarily. ESTIMATED BLOOD LOSS:  50 mL. DRAINS:  None. HISTORY:  This is a 22-year-old male, who was referred by Dr. Lizzie Harmon for a large soft tissue mass in the left upper back region. The area was draining sebaceous material.  It was erythematous and tender to palpation. I recommended excision of this. I saw him originally on 2019, and would have done a surgery the following day, but he was on Xarelto and 325 mg aspirin secondary to DVT in his right lower extremity. I held the Xarelto and scheduled his surgery for the morning of 2019. I went over the risks of bleeding, infection, anesthesia, hematoma, seroma formation, potential recurrence of the lesion. I said it may be necessary to place a drain. The patient had asked about whether this would be packed or closed, I said we would attempt to close the wound, but it may be necessary to leave it open and let it heal by secondary intention either with wet-to-dry gauze dressings or wound VAC placement.   As it turned out, the wound was much too large and infected to close primarily. The patient was agreeable, signed a consent form and was scheduled for today. PROCEDURE:  The patient was seen in the preop area . I asked about whether he had held his Xarelto, he said his last dose was Tuesday morning before he saw me, he did not take it Wednesday, Thursday, or the morning of surgery. He continued on his full-dose aspirin as he is high risk for recurrent DVT. He is morbidly obese with a BMI of 42, which obviously makes things little more difficult in terms of surgery. He was seen in the preop area with his daughter, [facility] and I marked the area with a marking pen. He was then transported to room #3 [facility], where he was intubated on the operating room table and then repositioned in the right lateral decubitus position. The patient's left upper back was prepped and draped in the usual sterile manner. I came in the room. A time-out was done identifying the patient, surgeon, procedure, and his birth date of 1950. When everyone in the room agreed, we began the procedure. I made an elliptical-type incision over an upraised area that had sebaceous material coming out of it. We went down and the incision was about 7 cm to start, I extended it later to 10 cm as this lesion was found to be quite large. I excised the skin and soft tissue, but no muscle or fascia. In two pieces, we excised the top half of the cyst and then found that it was quite deep. We had to extend the incision on the skin and I had asked for some assistance. Scrub tech was brought in to provide retraction to get this lesion as the base of it was 6 cm from the skin surface. We excised the entire cyst in two segments. Both of these were sent to Pathology as one specimen in formalin. Wound was irrigated. Hemostasis achieved with electrocautery.   There was purulent material found with the sebaceous material.  The entire area was indurated and erythematous around it. I did not feel like this could be closed. I had not discussed wound VAC placement with the patient and there was not one available at this time, so we packed it with Betadine-soaked Kerlix. He was extubated and brought to recovery room in stable condition. The plan is for him to go home today, leave the Betadine-soaked packing in until I see him in the office in 3 days on Monday, 08/26. At that time, we will change the packing and order a wound VAC if he so desires because he is very active. If he does not feel like he can handle the wound VAC with his job, then we will continue with wet-to-dry dressings daily. He can come in the office and see us daily or we can arrange for home health nurses if this is found to be feasible. He will resume his Xarelto on 08/25, and will continue his full-dose aspirin.       Alejandrina Nova MD      [id number]  D:  08/23/2019 12:11  T:  08/23/2019 12:20  JOB #:  [id number]

## 2019-08-29 PROBLEM — Z98.890 HISTORY OF EXCISION OF DERMOID CYST: Status: ACTIVE | Noted: 2019-08-29

## 2019-08-29 PROBLEM — Z86.018 HISTORY OF EXCISION OF DERMOID CYST: Status: ACTIVE | Noted: 2019-08-29

## 2019-09-19 ENCOUNTER — HOME HEALTH ADMISSION (OUTPATIENT)
Dept: HOME HEALTH SERVICES | Facility: HOME HEALTH | Age: 69
End: 2019-09-19

## 2019-09-19 PROBLEM — Z87.2 HX OF EXCISION OF EPIDERMAL INCLUSION CYST: Status: ACTIVE | Noted: 2019-08-29

## 2019-09-22 ENCOUNTER — HOME CARE VISIT (OUTPATIENT)
Dept: HOME HEALTH SERVICES | Facility: HOME HEALTH | Age: 69
End: 2019-09-22

## 2020-02-24 ENCOUNTER — HOSPITAL ENCOUNTER (OUTPATIENT)
Dept: WOUND CARE | Age: 70
Discharge: HOME | End: 2020-02-24
Attending: SURGERY
Payer: MEDICARE

## 2020-02-24 VITALS
HEIGHT: 72 IN | WEIGHT: 315 LBS | SYSTOLIC BLOOD PRESSURE: 128 MMHG | BODY MASS INDEX: 42.66 KG/M2 | HEART RATE: 100 BPM | TEMPERATURE: 98.1 F | DIASTOLIC BLOOD PRESSURE: 86 MMHG | RESPIRATION RATE: 20 BRPM

## 2020-02-24 PROCEDURE — 99212 OFFICE O/P EST SF 10 MIN: CPT

## 2020-02-24 NOTE — DISCHARGE INSTRUCTIONS
[address]  [address], [address]  Phone: [phone]  Fax: [phone]    Patient: Teresa Coppola MRN: [id number]  SSN: [id number]    YOB: 1950  Age: 71 y.o. Sex: male       Return Appointment: 1 week with Abbi Villaseñor MD    Instructions: Cleanse wound with normal saline. Dakin's solution 0.25%-apply to wound on gauze or packing strip, cover with abd, secure with paper tape or may use coversite. Change dressing daily. Dakin's solution escribed to pharmacy today by MD.  Ana M Bal up from pharmacy and use as directed    Should you experience increased redness, swelling, pain, foul odor, size of wound(s), or have a temperature over 101 degrees please contact the [address] [place] [address] Road at [phone] or if after hours contact your primary care physician or go to the hospital emergency department.     Signed By: Will Gutierres RN     February 24, 2020

## 2020-02-24 NOTE — PROGRESS NOTES
This is a 63-year-old male who had a large epidermal inclusion cyst removed about a year ago. The wound did not heal and has been packed open with wet-to-dry saline dressings at first and then switch to silver alginate. The wound has a biofilm in the lower part of the wound and we will use Dakin solution to try to remove this biofilm and then go to a different dressing mode. He will be seen again next week. Wound Center Progress Note    Patient: Rafat Rubio MRN: [id number]  SSN: [id number]    YOB: 1950  Age: 71 y.o. Sex: male      Subjective:     Chief Complaint:  Rafat Rubio is a 71 y.o. WHITE OR  male who presents with upper back wound of 10 months duration.     History of Present Illness:     See above note  Wound Caused By: non-healed surgical wound for excision of epidermal inclusion cyst  Associated Signs and Symptoms: Mild burning  Timing: Intermittent  Quality: Burning  Severity: 3/10  Modifying Factors: Obesity  Current Wound Care: See nurses notes    Past Medical History:   Diagnosis Date    Allergic sinusitis     Cholelithiasis     CKD (chronic kidney disease)     pt denies; CMP WNL (1/2019, 8/2019)    Degenerative disc disease, cervical     Diabetes mellitus type II     oral meds, does not check BG regularly, last hgba1c- 7.0 (8/2/19)    Elevated PSA     GERD (gastroesophageal reflux disease)     managed with medication    History of prostate cancer 2002    Hx of malignant neoplasm of soft tissue 2002    Hx of syncope     Hyperlipidemia     Hypertension     Migraine     \"haven't had in years\"    Multiple nevi     Obesity, morbid ([address]     bmi- 42    Personal history of colonic polyps 2015    adenomas    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    Thromboembolus ([address] 2002; 10/2018    right leg; family hx of factor 5 leiden- pt on xarelto and ASA    Ulnar nerve compression       Past Surgical History:   Procedure Laterality Date    HX COLONOSCOPY  3/24/15; most recent 3/2019    Tyler--two asc TA, three desc hyperplastic, one sigmoid TA--3 year recall    HX LAP CHOLECYSTECTOMY      HX MALIGNANT SKIN LESION EXCISION      sarcoma    HX ORTHOPAEDIC  1985    Relocate nerve in right elbow    HX PROSTATECTOMY      not removed, radiation seeds     Family History   Problem Relation Age of Onset    Cancer Father         lung    Heart Attack Mother     Heart Disease Mother     Cancer Paternal Grandmother     Cancer Paternal Grandfather     Lung Disease Paternal Grandfather       Social History     Tobacco Use    Smoking status: Former Smoker     Packs/day: 1.00     Years: 10.00     Pack years: 10.00     Last attempt to quit: 1984     Years since quittin.8    Smokeless tobacco: Never Used   Substance Use Topics    Alcohol use: Yes     Comment: rarely       Prior to Admission medications    Medication Sig Start Date End Date Taking? Authorizing Provider   sodium hypochlorite (DAKIN'S SOLUTION) external solution Moistened gauze dressing and apply to affected area daily 20  Yes Clarence Woody MD   niacin 1,000 mg TbER take 1 tablet by oral route 3 times every day 20   Gene Parekh MD   trimethoprim-sulfamethoxazole (BACTRIM DS, SEPTRA DS) 160-800 mg per tablet Take 1 Tab by mouth two (2) times a day for 14 days. 2/19/20 3/4/20  Gene Parekh MD   amLODIPine-valsartan (EXFORGE) 5-320 mg per tablet Take 1 Tab by mouth daily for 90 days. 20  Gene Parekh MD   atorvastatin (LIPITOR) 40 mg tablet take 1 tablet by oral route  every day 20   Gene Parekh MD   [facility]) 3.75 gram pwpk powder packet Take 1 Packet by mouth two (2) times daily (with meals) for 90 days.  20  Gene Parekh MD   dapagliflozin (FARXIGA) 10 mg tab tablet take 1 tablet by oral route  every day in the morning 20   Gene Parekh MD   ezetimibe (ZETIA) 10 mg tablet take 1 tablet by oral route  every day 1/27/20   Christofer Aguillon MD   fenofibric acid (TRILIPIX) 135 mg capsule take 1 capsule by oral route  every day 1/27/20   Christofer Aguillon MD   fexofenadine (ALLEGRA) 180 mg tablet take 1 tablet by oral route  every day as needed 1/27/20   Christofer Aguillon MD   metFORMIN (GLUCOPHAGE) 500 mg tablet TAKE 2 TABLETS TWICE A DAY 1/27/20   Christofer Aguillon MD   montelukast (SINGULAIR) 10 mg tablet take 1 tablet by oral route  every day in the evening as needed 1/27/20   Christofer Aguillon MD   omeprazole (PRILOSEC) 40 mg capsule Take 1 Cap by mouth daily for 90 days. 1/27/20 4/26/20  Christofer Aguillon MD   SITagliptin (JANUVIA) 100 mg tablet take 1 tablet by oral route  every day 1/27/20   Christofer Aguillon MD   VESICARE 5 mg tablet TAKE 1 TABLET BY MOUTH EVERY DAY 1/27/20   Christoefr Aguillon MD   XARELTO 20 mg tab tablet Take 1 Tab by mouth daily (with dinner) for 90 days. Take 1 tablet by oral route every day with the evening meal 1/27/20 4/26/20  Christofer Aguillon MD   silver-calcium alginate 8 X 12 \" bndg Pack in wound q3 days 10/3/19   Christofer Aguillon MD   [facility] 6[place]6 foam with no border dressing, 8x8 transparent dressing; pack wound with calcium alganate silver, cover with foam, secure with transparent dressing; change q3 days or if soiled; 10/3/19   Christofer Aguillon MD   Magnesium Oxide 500 mg cap Take 1 Cap by mouth daily. Provider, Historical   multivitamin (ONE A DAY) tablet take 1 tablet by oral route  every day with food    Provider, Historical   cholecalciferol, vitamin D3, 4,000 unit cap Take 1 Cap by mouth daily. Provider, Historical   omega 3-DHA-EPA-fish oil (FISH OIL) 1,000 mg (120 mg-180 mg) capsule Take 3 Caps by mouth daily. Provider, Historical   glucosamine/chondr mejia A sod (OSTEO BI-FLEX PO) Take 1 Tab by mouth daily. Provider, Historical   aspirin (ASPIRIN) 325 mg tablet Take 325 mg by mouth daily.     Provider, Historical No Known Allergies     Review of Systems:  A comprehensive review of systems was negative except for that written in the History of Present Illness. Lab Results   Component Value Date/Time    Hemoglobin A1c 7.2 (H) 02/12/2020 07:58 AM        Immunization History   Administered Date(s) Administered    Influenza High Dose Vaccine PF 01/19/2018    Influenza Vaccine 09/01/2012, 12/02/2013, 09/30/2015    Influenza Vaccine (Quad) PF 12/01/2016    Influenza Vaccine (Tri) Adjuvanted 10/03/2019    Pneumococcal Conjugate (PCV-13) 06/02/2016    Pneumococcal Polysaccharide (PPSV-23) 08/02/2018    Pneumococcal Vaccine (Unspecified Type) 10/01/2009    Td 01/01/2007    Tdap 01/19/2018    Zoster Vaccine, Live 05/12/2015       Body mass index is 43.26 kg/m². Counseling regarding nutrition done: No     Current medications:  Current Outpatient Medications   Medication Sig Dispense Refill    sodium hypochlorite (DAKIN'S SOLUTION) external solution Moistened gauze dressing and apply to affected area daily 1 Bottle 1    niacin 1,000 mg TbER take 1 tablet by oral route 3 times every day 270 Tab 3    trimethoprim-sulfamethoxazole (BACTRIM DS, SEPTRA DS) 160-800 mg per tablet Take 1 Tab by mouth two (2) times a day for 14 days. 28 Tab 0    amLODIPine-valsartan (EXFORGE) 5-320 mg per tablet Take 1 Tab by mouth daily for 90 days. 90 Tab 1    atorvastatin (LIPITOR) 40 mg tablet take 1 tablet by oral route  every day 90 Tab 1    colesevelam (WELCHOL) 3.75 gram pwpk powder packet Take 1 Packet by mouth two (2) times daily (with meals) for 90 days.  180 Packet 1    dapagliflozin (FARXIGA) 10 mg tab tablet take 1 tablet by oral route  every day in the morning 90 Tab 1    ezetimibe (ZETIA) 10 mg tablet take 1 tablet by oral route  every day 90 Tab 1    fenofibric acid (TRILIPIX) 135 mg capsule take 1 capsule by oral route  every day 90 Cap 1    fexofenadine (ALLEGRA) 180 mg tablet take 1 tablet by oral route  every day as needed 90 Tab 1    metFORMIN (GLUCOPHAGE) 500 mg tablet TAKE 2 TABLETS TWICE A  Tab 1    montelukast (SINGULAIR) 10 mg tablet take 1 tablet by oral route  every day in the evening as needed 90 Tab 1    omeprazole (PRILOSEC) 40 mg capsule Take 1 Cap by mouth daily for 90 days. 90 Cap 1    SITagliptin (JANUVIA) 100 mg tablet take 1 tablet by oral route  every day 90 Tab 1    VESICARE 5 mg tablet TAKE 1 TABLET BY MOUTH EVERY DAY 30 Tab 12    XARELTO 20 mg tab tablet Take 1 Tab by mouth daily (with dinner) for 90 days. Take 1 tablet by oral route every day with the evening meal 90 Tab 1    silver-calcium alginate 8 X 12 \" bndg Pack in wound q3 days 10 Each 3    miscellaneous medical supply misc 6x6 foam with no border dressing, 8x8 transparent dressing; pack wound with calcium alganate silver, cover with foam, secure with transparent dressing; change q3 days or if soiled; 10 Each 3    Magnesium Oxide 500 mg cap Take 1 Cap by mouth daily.  multivitamin (ONE A DAY) tablet take 1 tablet by oral route  every day with food      cholecalciferol, vitamin D3, 4,000 unit cap Take 1 Cap by mouth daily.  omega 3-DHA-EPA-fish oil (FISH OIL) 1,000 mg (120 mg-180 mg) capsule Take 3 Caps by mouth daily.  glucosamine/chondr mejia A sod (OSTEO BI-FLEX PO) Take 1 Tab by mouth daily.  aspirin (ASPIRIN) 325 mg tablet Take 325 mg by mouth daily. Current Facility-Administered Medications   Medication Dose Route Frequency Provider Last Rate Last Dose    sodium hypochlorite (HALF STRENGTH DAKIN'S) 0.25% irrigation (bottle) 30 mL  30 mL Topical ONCE Elaine Turpin MD             Objective:     Physical Exam:     Visit Vitals  /86 (BP 1 Location: Right arm, BP Patient Position: Sitting)   Pulse 100   Temp 98.1 °F (36.7 °C)   Resp 20   Ht 6' (1.829 m)   Wt 144.7 kg (319 lb)   BMI 43.26 kg/m²       General: well developed, well nourished, pleasant , NAD.  Hygiene good  Psych: cooperative. Pleasant. No anxiety or depression. Normal mood and affect. Neuro: alert and oriented to person/place/situation. Otherwise nonfocal.  Derm: Normal turgor for age, dry skin  HEENT: Normocephalic, atraumatic. EOMI. Conjunctiva clear. No scleral icterus. Neck: Normal range of motion. No masses. Chest: Good air entry bilaterally. Respirations nonlabored  Cardio: Normal heart sounds,no rubs, murmurs or gallops  Abdomen: Soft, nontender, nondistended, normoactive bowel sounds  Lower extremities: color normal; temperature normal. Hair growth is not present. Calves are supple, nontender, approximately equally sized in comparison. Capillary refill <3 sec            Ulcer Description:   Wound Back Left;Upper (Active)   Number of days: 185       Wound Back Upper (Active)   Dressing Status Breakthrough drainage 2/24/2020  9:33 AM   Non-staged Wound Description Full thickness 2/24/2020  9:33 AM   Wound Length (cm) 1.7 cm 2/24/2020  9:33 AM   Wound Width (cm) 4 cm 2/24/2020  9:33 AM   Wound Depth (cm) 0.2 cm 2/24/2020  9:33 AM   Wound Surface Area (cm^2) 6.8 cm^2 2/24/2020  9:33 AM   Wound Volume (cm^3) 1.36 cm^3 2/24/2020  9:33 AM   Condition of Base Granulation;Slough 2/24/2020  9:33 AM   Tissue Type Percent Red 30 2/24/2020  9:33 AM   Tissue Type Percent Yellow 70 2/24/2020  9:33 AM   Drainage Amount Moderate 2/24/2020  9:33 AM   Drainage Color Serosanguinous 2/24/2020  9:33 AM   Wound Odor None 2/24/2020  9:33 AM   Cydney-wound Assessment Blanchable erythema 2/24/2020  9:33 AM   Cleansing and Cleansing Agents  Normal saline 2/24/2020  9:33 AM   Number of days: 0         Data Review:   No results found for this or any previous visit (from the past 24 hour(s)). Assessment:     71 y.o. male with upper back combined ulcer.     Problem List  Date Reviewed: 2/19/2020          Codes Class Noted    Hx of excision of epidermal inclusion cyst ICD-10-CM: Z98.890, Z87.2  ICD-9-CM: V15.29  8/29/2019    Overview Signed 8/29/2019  8:43 AM by Joon Xiao MD     8/23/19, Dr. Kristi Judge; 10 cm x6 cm x6 cm epidermal inclusion cyst;             Benign neoplasm of sigmoid colon ICD-10-CM: D12.5  ICD-9-CM: 211.3  8/14/2019    Overview Signed 8/15/2019  8:39 AM by Joon Xiao MD     Colonoscopy 4/2019             Gastro-esophageal reflux disease with esophagitis ICD-10-CM: K21.0  ICD-9-CM: 530.11  8/14/2019        Factor 5 Leiden mutation, heterozygous ([address] ICD-10-CM: I21.00  ICD-9-CM: 289.81  1/19/2018    Overview Addendum 8/15/2019  8:40 AM by MD Dr. Michael Deluca, takes  mg every day, Xarelto 20 mg every day              History of DVT (deep vein thrombosis) ICD-10-CM: H38.781  ICD-9-CM: V12.51  3/24/2016    Overview Addendum 1/19/2018  2:07 PM by MD Dr. Michael Deluca  Overview:   Associated with low homocystine and heterozygote for factor V Leiden. Last Assessment & Plan:   No folic acid because of his prostate cancer. No anticoagulation. Overview:   Associated with low homocystine and heterozygote for factor V Leiden. Last Assessment & Plan:   No folic acid because of his prostate cancer. No anticoagulation. Overview:   Associated with low homocystine and heterozygote for factor V Leiden. Last Assessment & Plan:   No folic acid because of his prostate cancer. No anticoagulation. Dx 2002. Allergic rhinitis ICD-10-CM: J30.9  ICD-9-CM: 477.9  12/2/2013        History of prostate cancer ICD-10-CM: Z85.46  ICD-9-CM: V10.46  12/2/2013    Overview Addendum 1/19/2018  1:55 PM by MD Dr. Michael Deluca  Overview:   Seed implant therapy on December 29, 2003 indolent PSA at 0.055 in March 2015 and 0.066 in March 2016    Last Assessment & Plan:   We did not draw blood work this year. The PSA will be done along with the panel of lab work he has with Dr. Lincoln Farias.   There are no signs or symptoms on today's history and physical exam to suggest progression of the prostate cancer. I believe that remains reasonable to monitor him once a year and check the PSA once a year. I will see him back in a year and a further blood work to Dr. Addison Bhatt. Overview:   Seed implant therapy on December 29, 2003 indolent PSA at 0.055 in March 2015 and 0.066 in March 2016    Last Assessment & Plan:   We did not draw blood work this year. The PSA will be done along with the panel of lab work he has with Dr. Addison Bhatt. There are no signs or symptoms on today's history and physical exam to suggest progression of the prostate cancer. I believe that remains reasonable to monitor him once a year and check the PSA once a year. I will see him back in a year and a further blood work to Dr. Addison Bhatt. Dr. Alma Rowe. History of sarcoma of soft tissue ICD-10-CM: Z85.831  ICD-9-CM: V10.89  12/2/2013    Overview Addendum 1/19/2018  1:56 PM by MD Dr. Alma Davison  Overview:   Soft tissue sarcoma, presenting in January 2002, over the right posterior pelvis, status post neoadjuvant chemotherapy followed by surgery at [facility] and adjuvant chemotherapy with Adriamycin plus adjuvant radiation therapy. Continuing complete remission through March 2015 with annual follow-up. Last Assessment & Plan:   Physical exam remains negative. We'll check him once yearly we see him for prostate cancer. Overview:   Soft tissue sarcoma, presenting in January 2002, over the right posterior pelvis, status post neoadjuvant chemotherapy followed by surgery at [facility] and adjuvant chemotherapy with Adriamycin plus adjuvant radiation therapy. Continuing complete remission through March 2015 with annual follow-up. Last Assessment & Plan:   Physical exam remains negative. We'll check him once yearly we see him for prostate cancer. Dr. Alma Rowe.              Diabetes mellitus type 2, controlled ([facility] ICD-10-CM: E11.9  ICD-9-CM: 250.00  9/10/2013        Obesity, morbid ([facility] ICD-10-CM: E66.01  ICD-9-CM: 278.01  Unknown        Degenerative disc disease, cervical ICD-10-CM: M50.30  ICD-9-CM: 722.4  Unknown        Hyperlipidemia ICD-10-CM: E78.5  ICD-9-CM: 272.4  Unknown        Hypertension ICD-10-CM: I10  ICD-9-CM: 401.9  Unknown        CKD (chronic kidney disease) ICD-10-CM: N18.9  ICD-9-CM: 481. 9  Unknown               Plan:     Orders Placed This Encounter    WOUND CARE, DRESSING CHANGE     Cleanse wound with normal saline. Dakin's solution 0.25%-apply to wound on gauze or packing strip, cover with abd, secure with paper tape or may use coversite. Change dressing daily. Dakin's solution escribed to pharmacy today by MD. London zarco from pharmacy. Standing Status:   Standing     Number of Occurrences:   1    sodium hypochlorite (HALF STRENGTH DAKIN'S) 0.25% irrigation (bottle) 30 mL    sodium hypochlorite (DAKIN'S SOLUTION) external solution     Sig: Moistened gauze dressing and apply to affected area daily     Dispense:  1 Bottle     Refill:  1        Patient understood and agrees with plan. Questions answered. Follow-up Information     Follow up With Specialties Details Why Contact Info    [address] In 1 week  [facility] [address] [place] [address] [place] [id number] [phone]             Any procedures done today in the [address] [facility],[address] are documented in a separate note in [address] [place] [address] and made part of this record by reference.      Dictated using voice recognition software; proofread, but unrecognized errors may exist.    Signed By: Darya Ortez MD     February 24, 2020

## 2020-02-24 NOTE — WOUND CARE
02/24/20 0933   Wound Back Upper   Date First Assessed/Time First Assessed: 02/24/20 0928   Primary Wound Type: Open incision/surgical site  Location: Back  Wound Location Orientation: Upper   Dressing Status Breakthrough drainage   Dressing Type   (alginate with silver, foam, tegaderm)   Non-staged Wound Description Full thickness   Wound Length (cm) 1.7 cm   Wound Width (cm) 4 cm   Wound Depth (cm) 0.2 cm   Wound Surface Area (cm^2) 6.8 cm^2   Wound Volume (cm^3) 1.36 cm^3   Condition of Base Granulation;Slough   Tissue Type Percent Red 30   Tissue Type Percent Yellow 70   Drainage Amount Moderate   Drainage Color Serosanguinous   Wound Odor None   Cydney-wound Assessment Blanchable erythema   Cleansing and Cleansing Agents  Normal saline       Patient is taking Plavix and Aspirin 325 mg daily

## 2020-03-02 ENCOUNTER — HOSPITAL ENCOUNTER (OUTPATIENT)
Dept: WOUND CARE | Age: 70
Discharge: HOME | End: 2020-03-02
Attending: SURGERY
Payer: MEDICARE

## 2020-03-02 VITALS
HEIGHT: 72 IN | HEART RATE: 94 BPM | DIASTOLIC BLOOD PRESSURE: 77 MMHG | SYSTOLIC BLOOD PRESSURE: 139 MMHG | WEIGHT: 315 LBS | BODY MASS INDEX: 42.66 KG/M2 | TEMPERATURE: 98.3 F

## 2020-03-02 PROCEDURE — 99212 OFFICE O/P EST SF 10 MIN: CPT

## 2020-03-02 NOTE — WOUND CARE
03/02/20 [id number] Wound Back Upper Date First Assessed/Time First Assessed: 02/24/20 0928   Primary Wound Type: Open incision/surgical site  Location: Back  Wound Location Orientation: Upper Dressing Status Clean, dry, and intact Non-staged Wound Description Full thickness Wound Length (cm) 1.7 cm Wound Width (cm) 3.2 cm Wound Depth (cm) 0.1 cm Wound Surface Area (cm^2) 5.44 cm^2 Wound Volume (cm^3) 0.54 cm^3 Change in Wound Size % 20 Epithelialization (%) 10 Tissue Type Percent Red 80 Tissue Type Percent Yellow 10 Drainage Amount Small Drainage Color Serous Wound Odor None Cydney-wound Assessment Intact; Hyperpigmented Cleansing and Cleansing Agents  Normal saline Dressing Changed Changed/New Patient is on an anti coagulant daily [id number] 
 and xarelto.

## 2020-03-02 NOTE — PROGRESS NOTES
Mid back and this will open with Eduardoin's think we need to change it up and will put some Katty on it this week and see him again next week it looks as if there are some skin islands growing in. Wound Center Progress Note    Patient: Pacheco Cervantes MRN: [id number]  SSN: [id number]    YOB: 1950  Age: 71 y.o. Sex: male      Subjective:     Chief Complaint:  Pacheco Cervantes is a 71 y.o. WHITE OR  male who presents with upper back2 wound of 2 months duration.     History of Present Illness:     See above note  Wound Caused By: non-healed surgical wound for removal of sebaceous cyst  Associated Signs and Symptoms: Mild stinging  Timing: Intermittent  Quality: Burning  Severity: 2/10  Modifying Factors: Obesity and incisions size  Current Wound Care: See nurses notes    Past Medical History:   Diagnosis Date    Allergic sinusitis     Cholelithiasis     CKD (chronic kidney disease)     pt denies; CMP WNL (1/2019, 8/2019)    Degenerative disc disease, cervical     Diabetes mellitus type II     oral meds, does not check BG regularly, last hgba1c- 7.0 (8/2/19)    Elevated PSA     GERD (gastroesophageal reflux disease)     managed with medication    History of prostate cancer 2002    Hx of malignant neoplasm of soft tissue 2002    Hx of syncope     Hyperlipidemia     Hypertension     Migraine     \"haven't had in years\"    Multiple nevi     Obesity, morbid ([address]     bmi- 42    Personal history of colonic polyps 2015    adenomas    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    Thromboembolus ([facility] 2002; 10/2018    right leg; family hx of factor 5 leiden- pt on xarelto and ASA    Ulnar nerve compression       Past Surgical History:   Procedure Laterality Date    HX COLONOSCOPY  3/24/15; most recent 3/2019    Tyler--two asc TA, three desc hyperplastic, one sigmoid TA--3 year recall    HX LAP CHOLECYSTECTOMY  1980's    HX MALIGNANT SKIN LESION EXCISION  2002 sarcoma    HX ORTHOPAEDIC      Relocate nerve in right elbow    HX PROSTATECTOMY      not removed, radiation seeds     Family History   Problem Relation Age of Onset    Cancer Father         lung    Heart Attack Mother     Heart Disease Mother     Cancer Paternal Grandmother     Cancer Paternal Grandfather     Lung Disease Paternal Grandfather       Social History     Tobacco Use    Smoking status: Former Smoker     Packs/day: 1.00     Years: 10.00     Pack years: 10.00     Last attempt to quit: 1984     Years since quittin.8    Smokeless tobacco: Never Used   Substance Use Topics    Alcohol use: Yes     Comment: rarely       Prior to Admission medications    Medication Sig Start Date End Date Taking? Authorizing Provider   sodium hypochlorite (DAKIN'S SOLUTION) external solution Moistened gauze dressing and apply to affected area daily 20  Yes Maurilio Montero MD   niacin 1,000 mg TbER take 1 tablet by oral route 3 times every day 20  Yes Leslie Lazo MD   trimethoprim-sulfamethoxazole (BACTRIM DS, SEPTRA DS) 160-800 mg per tablet Take 1 Tab by mouth two (2) times a day for 14 days. 2/19/20 3/4/20 Yes Leslie Lazo MD   amLODIPine-valsartan (EXFORGE) 5-320 mg per tablet Take 1 Tab by mouth daily for 90 days.  20 Yes Leslie Lazo MD   atorvastatin (LIPITOR) 40 mg tablet take 1 tablet by oral route  every day 20  Yes Leslie Lazo MD   dapagliflozin (FARXIGA) 10 mg tab tablet take 1 tablet by oral route  every day in the morning 20  Yes Leslie Lazo MD   ezetimibe (ZETIA) 10 mg tablet take 1 tablet by oral route  every day 20  Yes Leslie Lazo MD   fenofibric acid (TRILIPIX) 135 mg capsule take 1 capsule by oral route  every day 20  Yes Leslie Lazo MD   fexofenadine (ALLEGRA) 180 mg tablet take 1 tablet by oral route  every day as needed 20  Yes Leslie Lazo MD   metFORMIN (GLUCOPHAGE) 500 mg tablet TAKE 2 TABLETS TWICE A DAY 1/27/20  Yes Peace Dominguez MD   montelukast (SINGULAIR) 10 mg tablet take 1 tablet by oral route  every day in the evening as needed 1/27/20  Yes Peace Dominguez MD   omeprazole (PRILOSEC) 40 mg capsule Take 1 Cap by mouth daily for 90 days. 1/27/20 4/26/20 Yes Peace Dominguez MD   SITagliptin (JANUVIA) 100 mg tablet take 1 tablet by oral route  every day 1/27/20  Yes Peace Dominguez MD   VESICARE 5 mg tablet TAKE 1 TABLET BY MOUTH EVERY DAY 1/27/20  Yes Peace Dominguez MD   XARELTO 20 mg tab tablet Take 1 Tab by mouth daily (with dinner) for 90 days. Take 1 tablet by oral route every day with the evening meal 1/27/20 4/26/20 Yes Peace Dominguez MD   silver-calcium alginate 8 X 12 \" bndg Pack in wound q3 days 10/3/19  Yes Peace Dominguez MD   Magnesium Oxide 500 mg cap Take 1 Cap by mouth daily. Yes Provider, Historical   multivitamin (ONE A DAY) tablet take 1 tablet by oral route  every day with food   Yes Provider, Historical   cholecalciferol, vitamin D3, 4,000 unit cap Take 1 Cap by mouth daily. Yes Provider, Historical   omega 3-DHA-EPA-fish oil (FISH OIL) 1,000 mg (120 mg-180 mg) capsule Take 3 Caps by mouth daily. Yes Provider, Historical   glucosamine/chondr mejia A sod (OSTEO BI-FLEX PO) Take 1 Tab by mouth daily. Yes Provider, Historical   aspirin (ASPIRIN) 325 mg tablet Take 325 mg by mouth daily. Yes Provider, Historical   miscellaneous medical supply misc 6x6 foam with no border dressing, 8x8 transparent dressing; pack wound with calcium alganate silver, cover with foam, secure with transparent dressing; change q3 days or if soiled; 10/3/19   Peace Dominguez MD     No Known Allergies     Review of Systems:  A comprehensive review of systems was negative except for that written in the History of Present Illness.      Lab Results   Component Value Date/Time    Hemoglobin A1c 7.2 (H) 02/12/2020 07:58 AM        Immunization History   Administered Date(s) Administered    Influenza High Dose Vaccine PF 01/19/2018    Influenza Vaccine 09/01/2012, 12/02/2013, 09/30/2015    Influenza Vaccine (Quad) PF 12/01/2016    Influenza Vaccine (Tri) Adjuvanted 10/03/2019    Pneumococcal Conjugate (PCV-13) 06/02/2016    Pneumococcal Polysaccharide (PPSV-23) 08/02/2018    Pneumococcal Vaccine (Unspecified Type) 10/01/2009    Td 01/01/2007    Tdap 01/19/2018    Zoster Vaccine, Live 05/12/2015       Body mass index is 42.72 kg/m². Counseling regarding nutrition done: No     Current medications:  Current Outpatient Medications   Medication Sig Dispense Refill    sodium hypochlorite (DAKIN'S SOLUTION) external solution Moistened gauze dressing and apply to affected area daily 1 Bottle 1    niacin 1,000 mg TbER take 1 tablet by oral route 3 times every day 270 Tab 3    trimethoprim-sulfamethoxazole (BACTRIM DS, SEPTRA DS) 160-800 mg per tablet Take 1 Tab by mouth two (2) times a day for 14 days. 28 Tab 0    amLODIPine-valsartan (EXFORGE) 5-320 mg per tablet Take 1 Tab by mouth daily for 90 days. 90 Tab 1    atorvastatin (LIPITOR) 40 mg tablet take 1 tablet by oral route  every day 90 Tab 1    dapagliflozin (FARXIGA) 10 mg tab tablet take 1 tablet by oral route  every day in the morning 90 Tab 1    ezetimibe (ZETIA) 10 mg tablet take 1 tablet by oral route  every day 90 Tab 1    fenofibric acid (TRILIPIX) 135 mg capsule take 1 capsule by oral route  every day 90 Cap 1    fexofenadine (ALLEGRA) 180 mg tablet take 1 tablet by oral route  every day as needed 90 Tab 1    metFORMIN (GLUCOPHAGE) 500 mg tablet TAKE 2 TABLETS TWICE A  Tab 1    montelukast (SINGULAIR) 10 mg tablet take 1 tablet by oral route  every day in the evening as needed 90 Tab 1    omeprazole (PRILOSEC) 40 mg capsule Take 1 Cap by mouth daily for 90 days.  90 Cap 1    SITagliptin (JANUVIA) 100 mg tablet take 1 tablet by oral route  every day 90 Tab 1    VESICARE 5 mg tablet TAKE 1 TABLET BY MOUTH EVERY DAY 30 Tab 12    XARELTO 20 mg tab tablet Take 1 Tab by mouth daily (with dinner) for 90 days. Take 1 tablet by oral route every day with the evening meal 90 Tab 1    silver-calcium alginate 8 X 12 \" bndg Pack in wound q3 days 10 Each 3    Magnesium Oxide 500 mg cap Take 1 Cap by mouth daily.  multivitamin (ONE A DAY) tablet take 1 tablet by oral route  every day with food      cholecalciferol, vitamin D3, 4,000 unit cap Take 1 Cap by mouth daily.  omega 3-DHA-EPA-fish oil (FISH OIL) 1,000 mg (120 mg-180 mg) capsule Take 3 Caps by mouth daily.  glucosamine/chondr mejia A sod (OSTEO BI-FLEX PO) Take 1 Tab by mouth daily.  aspirin (ASPIRIN) 325 mg tablet Take 325 mg by mouth daily.  miscellaneous medical supply misc 6x6 foam with no border dressing, 8x8 transparent dressing; pack wound with calcium alganate silver, cover with foam, secure with transparent dressing; change q3 days or if soiled; 10 Each 3         Objective:     Physical Exam:     Visit Vitals  /77 (BP 1 Location: Left arm, BP Patient Position: Sitting)   Pulse 94   Temp 98.3 °F (36.8 °C)   Ht 6' (1.829 m)   Wt 142.9 kg (315 lb)   BMI 42.72 kg/m²       General: well developed, well nourished, pleasant , NAD. Hygiene good  Psych: cooperative. Pleasant. No anxiety or depression. Normal mood and affect. Neuro: alert and oriented to person/place/situation. Otherwise nonfocal.  Derm: Normal turgor for age, dry skin  HEENT: Normocephalic, atraumatic. EOMI. Conjunctiva clear. No scleral icterus. Neck: Normal range of motion. No masses. Chest: Good air entry bilaterally. Respirations nonlabored  Cardio: Normal heart sounds,no rubs, murmurs or gallops  Abdomen: Soft, nontender, nondistended, normoactive bowel sounds  Lower extremities: color normal; temperature normal. Hair growth is not present. Calves are supple, nontender, approximately equally sized in comparison.  Capillary refill <3 sec            Ulcer Description:   Wound Back Left;Upper (Active)   Number of days: 192       Wound Back Upper (Active)   Dressing Status Clean, dry, and intact 3/2/2020  8:54 AM   Non-staged Wound Description Full thickness 3/2/2020  8:54 AM   Wound Length (cm) 1.7 cm 3/2/2020  8:54 AM   Wound Width (cm) 3.2 cm 3/2/2020  8:54 AM   Wound Depth (cm) 0.1 cm 3/2/2020  8:54 AM   Wound Surface Area (cm^2) 5.44 cm^2 3/2/2020  8:54 AM   Wound Volume (cm^3) 0.54 cm^3 3/2/2020  8:54 AM   Change in Wound Size % 20 3/2/2020  8:54 AM   Condition of Base Granulation;Slough 2/24/2020  9:33 AM   Epithelialization (%) 10 3/2/2020  8:54 AM   Tissue Type Percent Red 80 3/2/2020  8:54 AM   Tissue Type Percent Yellow 10 3/2/2020  8:54 AM   Drainage Amount Small 3/2/2020  8:54 AM   Drainage Color Serous 3/2/2020  8:54 AM   Wound Odor None 3/2/2020  8:54 AM   Cydney-wound Assessment Intact; Hyperpigmented 3/2/2020  8:54 AM   Cleansing and Cleansing Agents  Normal saline 3/2/2020  8:54 AM   Dressing Changed Changed/New 3/2/2020  8:54 AM   Number of days: 7         Data Review:   No results found for this or any previous visit (from the past 24 hour(s)). Assessment:     71 y.o. male with upper back combined ulcer.     Problem List  Date Reviewed: 2/19/2020          Codes Class Noted    Hx of excision of epidermal inclusion cyst ICD-10-CM: Z98.890, Z87.2  ICD-9-CM: V15.29  8/29/2019    Overview Signed 8/29/2019  8:43 AM by Kirk Becerra MD     8/23/19, Dr. Letty Yates; 10 cm x6 cm x6 cm epidermal inclusion cyst;             Benign neoplasm of sigmoid colon ICD-10-CM: D12.5  ICD-9-CM: 211.3  8/14/2019    Overview Signed 8/15/2019  8:39 AM by Kirk Becerra MD     Colonoscopy 4/2019             Gastro-esophageal reflux disease with esophagitis ICD-10-CM: K21.0  ICD-9-CM: 530.11  8/14/2019        Factor 5 Leiden mutation, heterozygous ([facility] ICD-10-CM: I91.33  ICD-9-CM: 289.81  1/19/2018    Overview Addendum 8/15/2019  8:40 AM by Kirk Becerra MD Dr. Darryl Martínez, takes  mg every day, Xarelto 20 mg every day              History of DVT (deep vein thrombosis) ICD-10-CM: K45.837  ICD-9-CM: V12.51  3/24/2016    Overview Addendum 1/19/2018  2:07 PM by MD Dr. Darryl Higgins  Overview:   Associated with low homocystine and heterozygote for factor V Leiden. Last Assessment & Plan:   No folic acid because of his prostate cancer. No anticoagulation. Overview:   Associated with low homocystine and heterozygote for factor V Leiden. Last Assessment & Plan:   No folic acid because of his prostate cancer. No anticoagulation. Overview:   Associated with low homocystine and heterozygote for factor V Leiden. Last Assessment & Plan:   No folic acid because of his prostate cancer. No anticoagulation. Dx 2002. Allergic rhinitis ICD-10-CM: J30.9  ICD-9-CM: 477.9  12/2/2013        History of prostate cancer ICD-10-CM: Z85.46  ICD-9-CM: V10.46  12/2/2013    Overview Addendum 1/19/2018  1:55 PM by MD Dr. Darryl Higgins  Overview:   Seed implant therapy on December 29, 2003 indolent PSA at 0.055 in March 2015 and 0.066 in March 2016    Last Assessment & Plan:   We did not draw blood work this year. The PSA will be done along with the panel of lab work he has with Dr. Quintin Gardiner. There are no signs or symptoms on today's history and physical exam to suggest progression of the prostate cancer. I believe that remains reasonable to monitor him once a year and check the PSA once a year. I will see him back in a year and a further blood work to Dr. Quintin Gardiner. Overview:   Seed implant therapy on December 29, 2003 indolent PSA at 0.055 in March 2015 and 0.066 in March 2016    Last Assessment & Plan:   We did not draw blood work this year. The PSA will be done along with the panel of lab work he has with Dr. Quintin Gardiner. There are no signs or symptoms on today's history and physical exam to suggest progression of the prostate cancer.   I believe that remains reasonable to monitor him once a year and check the PSA once a year. I will see him back in a year and a further blood work to Dr. Kourtney Silveira. Dr. Trinity Nick. History of sarcoma of soft tissue ICD-10-CM: Z85.831  ICD-9-CM: V10.89  12/2/2013    Overview Addendum 1/19/2018  1:56 PM by MD Dr. Trinity Rocha  Overview:   Soft tissue sarcoma, presenting in January 2002, over the right posterior pelvis, status post neoadjuvant chemotherapy followed by surgery at [facility] and adjuvant chemotherapy with Adriamycin plus adjuvant radiation therapy. Continuing complete remission through March 2015 with annual follow-up. Last Assessment & Plan:   Physical exam remains negative. We'll check him once yearly we see him for prostate cancer. Overview:   Soft tissue sarcoma, presenting in January 2002, over the right posterior pelvis, status post neoadjuvant chemotherapy followed by surgery at [facility] and adjuvant chemotherapy with Adriamycin plus adjuvant radiation therapy. Continuing complete remission through March 2015 with annual follow-up. Last Assessment & Plan:   Physical exam remains negative. We'll check him once yearly we see him for prostate cancer. Dr. Trinity Nick. Diabetes mellitus type 2, controlled ([facility] [address] ICD-10-CM: E11.9  ICD-9-CM: 250.00  9/10/2013        Obesity, morbid (HCC) ICD-10-CM: E66.01  ICD-9-CM: 278.01  Unknown        Degenerative disc disease, cervical ICD-10-CM: M50.30  ICD-9-CM: 722.4  Unknown        Hyperlipidemia ICD-10-CM: E78.5  ICD-9-CM: 272.4  Unknown        Hypertension ICD-10-CM: I10  ICD-9-CM: 401.9  Unknown        CKD (chronic kidney disease) ICD-10-CM: N18.9  ICD-9-CM: 128. 9  Unknown               Plan:     No orders of the defined types were placed in this encounter. Patient understood and agrees with plan. Questions answered.     Follow-up Information    None            Any procedures done today in the [address] [facility],[address] are documented in a separate note in [facility] and made part of this record by reference.      Dictated using voice recognition software; proofread, but unrecognized errors may exist.    Signed By: Ebony Hough MD     March 2, 2020

## 2020-03-02 NOTE — DISCHARGE INSTRUCTIONS
[address]  [address], [address]   Phone: [phone]  Fax: [phone]    Patient: Lety Preciado MRN: [id number]  SSN: [id number]    YOB: 1950  Age: 71 y.o. Sex: male       Return Appointment: 1 week with Cynthia Elliott MD    Instructions: Upper back  Cleanse wound and periwound with wound cleanser or normal saline. Katty (may substitute equivalent collagen): cut to approximate wound size, apply to wound bed. Secure with gauze and covrsite or border foam.  Change every other day. Follow up in one week. Should you experience increased redness, swelling, pain, foul odor, size of wound(s), or have a temperature over 101 degrees please contact the [address] [place] [address] Road at [phone] or if after hours contact your primary care physician or go to the hospital emergency department.     Signed By: Betsey Ascencio     March 2, 2020

## 2020-03-09 ENCOUNTER — HOSPITAL ENCOUNTER (OUTPATIENT)
Dept: WOUND CARE | Age: 70
Discharge: HOME | End: 2020-03-09
Attending: SURGERY
Payer: MEDICARE

## 2020-03-09 VITALS
HEART RATE: 91 BPM | TEMPERATURE: 98.3 F | HEIGHT: 72 IN | DIASTOLIC BLOOD PRESSURE: 80 MMHG | OXYGEN SATURATION: 98 % | WEIGHT: 315 LBS | BODY MASS INDEX: 42.66 KG/M2 | SYSTOLIC BLOOD PRESSURE: 135 MMHG | RESPIRATION RATE: 18 BRPM

## 2020-03-09 PROCEDURE — 99212 OFFICE O/P EST SF 10 MIN: CPT

## 2020-03-09 NOTE — PROGRESS NOTES
Additional ingrowth since last visit we will continue to dress with collagen and see again in 2 weeks. ... Wound Center Progress Note    Patient: Noel Booth MRN: [id number]  SSN: [id number]    YOB: 1950  Age: 71 y.o. Sex: male      Subjective:     Chief Complaint:  Noel Booth is a 71 y.o. WHITE OR  male who presents with upper back wound of 2 months duration.     History of Present Illness:       Wound Caused By: non-healed surgical wound for excision large sebaceous cyst  Associated Signs and Symptoms: Mild pain  Timing: Intermittent  Quality: Burning  Severity: 2/10  Modifying Factors: Obesity  Current Wound Care: See nurses notes    Past Medical History:   Diagnosis Date    Allergic sinusitis     Cholelithiasis     CKD (chronic kidney disease)     pt denies; CMP WNL (1/2019, 8/2019)    Degenerative disc disease, cervical     Diabetes mellitus type II     oral meds, does not check BG regularly, last hgba1c- 7.0 (8/2/19)    Elevated PSA     GERD (gastroesophageal reflux disease)     managed with medication    History of prostate cancer 2002    Hx of malignant neoplasm of soft tissue 2002    Hx of syncope     Hyperlipidemia     Hypertension     Migraine     \"haven't had in years\"    Multiple nevi     Obesity, morbid ([address]     bmi- 42    Personal history of colonic polyps 2015    adenomas    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    Thromboembolus ([address] 2002; 10/2018    right leg; family hx of factor 5 leiden- pt on xarelto and ASA    Ulnar nerve compression       Past Surgical History:   Procedure Laterality Date    HX COLONOSCOPY  3/24/15; most recent 3/2019    Tyler--two asc TA, three desc hyperplastic, one sigmoid TA--3 year recall    HX LAP CHOLECYSTECTOMY  1980's    HX MALIGNANT SKIN LESION EXCISION  2002    sarcoma    HX ORTHOPAEDIC  1985    Relocate nerve in right elbow    HX PROSTATECTOMY  2002    not removed, radiation seeds Family History   Problem Relation Age of Onset    Cancer Father         lung    Heart Attack Mother     Heart Disease Mother     Cancer Paternal Grandmother     Cancer Paternal Grandfather     Lung Disease Paternal Grandfather       Social History     Tobacco Use    Smoking status: Former Smoker     Packs/day: 1.00     Years: 10.00     Pack years: 10.00     Last attempt to quit: 1984     Years since quittin.8    Smokeless tobacco: Never Used   Substance Use Topics    Alcohol use: Yes     Comment: rarely       Prior to Admission medications    Medication Sig Start Date End Date Taking? Authorizing Provider   niacin (NIASPAN) 1,000 mg Tb24 tab TAKE 1 TABLET BY MOUTH THREE TIMES A DAY 20  Yes Provider, Historical   colesevelam (WELCHOL) 3.75 gram pwpk powder packet Take 3.75 g by mouth daily. 3/4/20  Yes Taylor Pulido MD   amLODIPine-valsartan (EXFORGE) 5-320 mg per tablet Take 1 Tab by mouth daily for 90 days. 20 Yes Taylor Pulido MD   atorvastatin (LIPITOR) 40 mg tablet take 1 tablet by oral route  every day 20  Yes Taylor Pulido MD   dapagliflozin (FARXIGA) 10 mg tab tablet take 1 tablet by oral route  every day in the morning 20  Yes Taylor Pulido MD   ezetimibe (ZETIA) 10 mg tablet take 1 tablet by oral route  every day 20  Yes Taylor Pulido MD   fenofibric acid (TRILIPIX) 135 mg capsule take 1 capsule by oral route  every day 20  Yes Taylor Pulido MD   fexofenadine (ALLEGRA) 180 mg tablet take 1 tablet by oral route  every day as needed 20  Yes Taylor Pulido MD   metFORMIN (GLUCOPHAGE) 500 mg tablet TAKE 2 TABLETS TWICE A DAY 20  Yes Taylor Pulido MD   montelukast (SINGULAIR) 10 mg tablet take 1 tablet by oral route  every day in the evening as needed 20  Yes Taylor Pulido MD   omeprazole (PRILOSEC) 40 mg capsule Take 1 Cap by mouth daily for 90 days.  20 Yes Taylor Pulido MD   SITagliptin (DAVEUVIA) 100 mg tablet take 1 tablet by oral route  every day 1/27/20  Yes Orquidea Porras MD   VESICARE 5 mg tablet TAKE 1 TABLET BY MOUTH EVERY DAY 1/27/20  Yes Orquidea Porras MD   XARELTO 20 mg tab tablet Take 1 Tab by mouth daily (with dinner) for 90 days. Take 1 tablet by oral route every day with the evening meal 1/27/20 4/26/20 Yes Orquidea Porras MD   Magnesium Oxide 500 mg cap Take 1 Cap by mouth daily. Yes Provider, Historical   multivitamin (ONE A DAY) tablet take 1 tablet by oral route  every day with food   Yes Provider, Historical   cholecalciferol, vitamin D3, 4,000 unit cap Take 1 Cap by mouth daily. Yes Provider, Historical   omega 3-DHA-EPA-fish oil (FISH OIL) 1,000 mg (120 mg-180 mg) capsule Take 3 Caps by mouth daily. Yes Provider, Historical   glucosamine/chondr mejia A sod (OSTEO BI-FLEX PO) Take 1 Tab by mouth daily. Yes Provider, Historical   aspirin (ASPIRIN) 325 mg tablet Take 325 mg by mouth daily. Yes Provider, Historical   silver-calcium alginate 8 X 12 \" bndg Pack in wound q3 days 10/3/19   Orquidea Porras MD   miscellaneous medical supply misc 6x6 foam with no border dressing, 8x8 transparent dressing; pack wound with calcium alganate silver, cover with foam, secure with transparent dressing; change q3 days or if soiled; 10/3/19   Orquidea Porras MD     No Known Allergies     Review of Systems:  A comprehensive review of systems was negative except for that written in the History of Present Illness.      Lab Results   Component Value Date/Time    Hemoglobin A1c 7.2 (H) 02/12/2020 07:58 AM        Immunization History   Administered Date(s) Administered    Influenza High Dose Vaccine PF 01/19/2018    Influenza Vaccine 09/01/2012, 12/02/2013, 09/30/2015    Influenza Vaccine (Quad) PF 12/01/2016    Influenza Vaccine (Tri) Adjuvanted 10/03/2019    Pneumococcal Conjugate (PCV-13) 06/02/2016    Pneumococcal Polysaccharide (PPSV-23) 08/02/2018    Pneumococcal Vaccine (Unspecified Type) 10/01/2009    Td 01/01/2007    Tdap 01/19/2018    Zoster Recombinant 02/19/2020    Zoster Vaccine, Live 05/12/2015       Body mass index is 42.72 kg/m². Counseling regarding nutrition done: No     Current medications:  Current Outpatient Medications   Medication Sig Dispense Refill    niacin (NIASPAN) 1,000 mg Tb24 tab TAKE 1 TABLET BY MOUTH THREE TIMES A DAY      colesevelam (WELCHOL) 3.75 gram pwpk powder packet Take 3.75 g by mouth daily. 180 Packet 1    amLODIPine-valsartan (EXFORGE) 5-320 mg per tablet Take 1 Tab by mouth daily for 90 days. 90 Tab 1    atorvastatin (LIPITOR) 40 mg tablet take 1 tablet by oral route  every day 90 Tab 1    dapagliflozin (FARXIGA) 10 mg tab tablet take 1 tablet by oral route  every day in the morning 90 Tab 1    ezetimibe (ZETIA) 10 mg tablet take 1 tablet by oral route  every day 90 Tab 1    fenofibric acid (TRILIPIX) 135 mg capsule take 1 capsule by oral route  every day 90 Cap 1    fexofenadine (ALLEGRA) 180 mg tablet take 1 tablet by oral route  every day as needed 90 Tab 1    metFORMIN (GLUCOPHAGE) 500 mg tablet TAKE 2 TABLETS TWICE A  Tab 1    montelukast (SINGULAIR) 10 mg tablet take 1 tablet by oral route  every day in the evening as needed 90 Tab 1    omeprazole (PRILOSEC) 40 mg capsule Take 1 Cap by mouth daily for 90 days. 90 Cap 1    SITagliptin (JANUVIA) 100 mg tablet take 1 tablet by oral route  every day 90 Tab 1    VESICARE 5 mg tablet TAKE 1 TABLET BY MOUTH EVERY DAY 30 Tab 12    XARELTO 20 mg tab tablet Take 1 Tab by mouth daily (with dinner) for 90 days. Take 1 tablet by oral route every day with the evening meal 90 Tab 1    Magnesium Oxide 500 mg cap Take 1 Cap by mouth daily.  multivitamin (ONE A DAY) tablet take 1 tablet by oral route  every day with food      cholecalciferol, vitamin D3, 4,000 unit cap Take 1 Cap by mouth daily.       omega 3-DHA-EPA-fish oil (FISH OIL) 1,000 mg (120 mg-180 mg) capsule Take 3 Caps by mouth daily.  glucosamine/chondr mejia A sod (OSTEO BI-FLEX PO) Take 1 Tab by mouth daily.  aspirin (ASPIRIN) 325 mg tablet Take 325 mg by mouth daily.  silver-calcium alginate 8 X 12 \" bndg Pack in wound q3 days 10 Each 3    miscellaneous medical supply misc 6x6 foam with no border dressing, 8x8 transparent dressing; pack wound with calcium alganate silver, cover with foam, secure with transparent dressing; change q3 days or if soiled; 10 Each 3         Objective:     Physical Exam:     Visit Vitals  /80 (BP 1 Location: Left arm)   Pulse 91   Temp 98.3 °F (36.8 °C)   Resp 18   Ht 6' (1.829 m)   Wt 142.9 kg (315 lb)   SpO2 98%   BMI 42.72 kg/m²       General: well developed, well nourished, pleasant , NAD. Hygiene good  Psych: cooperative. Pleasant. No anxiety or depression. Normal mood and affect. Neuro: alert and oriented to person/place/situation. Otherwise nonfocal.  Derm: Normal turgor for age, dry skin  HEENT: Normocephalic, atraumatic. EOMI. Conjunctiva clear. No scleral icterus. Neck: Normal range of motion. No masses. Chest: Good air entry bilaterally. Respirations nonlabored  Cardio: Normal heart sounds,no rubs, murmurs or gallops  Abdomen: Soft, nontender, nondistended, normoactive bowel sounds  Lower extremities: color normal; temperature normal. Hair growth is not present. Calves are supple, nontender, approximately equally sized in comparison.  Capillary refill <3 sec            Ulcer Description:   Wound Back Left;Upper (Active)   Number of days: 199       Wound Back Upper (Active)   Dressing Status Clean, dry, and intact 3/9/2020  9:09 AM   Non-staged Wound Description Full thickness 3/9/2020  9:09 AM   Wound Length (cm) 1.5 cm 3/9/2020  9:09 AM   Wound Width (cm) 3 cm 3/9/2020  9:09 AM   Wound Depth (cm) 0.1 cm 3/9/2020  9:09 AM   Wound Surface Area (cm^2) 4.5 cm^2 3/9/2020  9:09 AM   Wound Volume (cm^3) 0.45 cm^3 3/9/2020  9:09 AM   Change in Wound Size % 33.82 3/9/2020  9:09 AM   Condition of Base Epithelializing;Granulation 3/9/2020  9:09 AM   Epithelialization (%) 10 3/2/2020  8:54 AM   Tissue Type Percent Red 100 3/9/2020  9:09 AM   Tissue Type Percent Yellow 10 3/2/2020  8:54 AM   Drainage Amount Small 3/9/2020  9:09 AM   Drainage Color Serous 3/9/2020  9:09 AM   Wound Odor None 3/9/2020  9:09 AM   Cydney-wound Assessment Hyperpigmented 3/9/2020  9:09 AM   Cleansing and Cleansing Agents  Normal saline 3/9/2020  9:09 AM   Dressing Changed Changed/New 3/9/2020  9:09 AM   Number of days: 14         Data Review:   No results found for this or any previous visit (from the past 24 hour(s)). Assessment:     71 y.o. male with upper back combined ulcer. Problem List  Date Reviewed: 3/4/2020          Codes Class Noted    Hx of excision of epidermal inclusion cyst ICD-10-CM: Z98.890, Z87.2  ICD-9-CM: V15.29  8/29/2019    Overview Signed 8/29/2019  8:43 AM by Jack Brown MD     8/23/19, Dr. Franklyn Lauren; 10 cm x6 cm x6 cm epidermal inclusion cyst;             Benign neoplasm of sigmoid colon ICD-10-CM: D12.5  ICD-9-CM: 211.3  8/14/2019    Overview Signed 8/15/2019  8:39 AM by Jack Brown MD     Colonoscopy 4/2019             Gastro-esophageal reflux disease with esophagitis ICD-10-CM: K21.0  ICD-9-CM: 530.11  8/14/2019        Factor 5 Leiden mutation, heterozygous ([facility] ICD-10-CM: X12.20  ICD-9-CM: 289.81  1/19/2018    Overview Addendum 8/15/2019  8:40 AM by MD Dr. Ankita Castro, takes  mg every day, Xarelto 20 mg every day              History of DVT (deep vein thrombosis) ICD-10-CM: H10.042  ICD-9-CM: V12.51  3/24/2016    Overview Addendum 1/19/2018  2:07 PM by MD Dr. Ankita Castro  Overview:   Associated with low homocystine and heterozygote for factor V Leiden. Last Assessment & Plan:   No folic acid because of his prostate cancer. No anticoagulation.   Overview:   Associated with low homocystine and heterozygote for factor V Leiden. Last Assessment & Plan:   No folic acid because of his prostate cancer. No anticoagulation. Overview:   Associated with low homocystine and heterozygote for factor V Leiden. Last Assessment & Plan:   No folic acid because of his prostate cancer. No anticoagulation. Dx 2002. Allergic rhinitis ICD-10-CM: J30.9  ICD-9-CM: 477.9  12/2/2013        History of prostate cancer ICD-10-CM: Z85.46  ICD-9-CM: V10.46  12/2/2013    Overview Addendum 1/19/2018  1:55 PM by MD Dr. Ankita Castro  Overview:   Seed implant therapy on December 29, 2003 indolent PSA at 0.055 in March 2015 and 0.066 in March 2016    Last Assessment & Plan:   We did not draw blood work this year. The PSA will be done along with the panel of lab work he has with Dr. Jake Almanzar. There are no signs or symptoms on today's history and physical exam to suggest progression of the prostate cancer. I believe that remains reasonable to monitor him once a year and check the PSA once a year. I will see him back in a year and a further blood work to Dr. Jake Almanzar. Overview:   Seed implant therapy on December 29, 2003 indolent PSA at 0.055 in March 2015 and 0.066 in March 2016    Last Assessment & Plan:   We did not draw blood work this year. The PSA will be done along with the panel of lab work he has with Dr. Jake Almanzar. There are no signs or symptoms on today's history and physical exam to suggest progression of the prostate cancer. I believe that remains reasonable to monitor him once a year and check the PSA once a year. I will see him back in a year and a further blood work to Dr. Jake Almanzar. Dr. Ankita Tinoco.              History of sarcoma of soft tissue ICD-10-CM: Z85.831  ICD-9-CM: V10.89  12/2/2013    Overview Addendum 1/19/2018  1:56 PM by MD Dr. Ankita Castro  Overview:   Soft tissue sarcoma, presenting in January 2002, over the right posterior pelvis, status post neoadjuvant chemotherapy followed by surgery at [facility] and adjuvant chemotherapy with Adriamycin plus adjuvant radiation therapy. Continuing complete remission through March 2015 with annual follow-up. Last Assessment & Plan:   Physical exam remains negative. We'll check him once yearly we see him for prostate cancer. Overview:   Soft tissue sarcoma, presenting in January 2002, over the right posterior pelvis, status post neoadjuvant chemotherapy followed by surgery at [facility] and adjuvant chemotherapy with Adriamycin plus adjuvant radiation therapy. Continuing complete remission through March 2015 with annual follow-up. Last Assessment & Plan:   Physical exam remains negative. We'll check him once yearly we see him for prostate cancer. Dr. Darryl Martínez. Diabetes mellitus type 2, controlled ([facility] ICD-10-CM: E11.9  ICD-9-CM: 250.00  9/10/2013        Obesity, morbid ([facility]) ICD-10-CM: E66.01  ICD-9-CM: 278.01  Unknown        Degenerative disc disease, cervical ICD-10-CM: M50.30  ICD-9-CM: 722.4  Unknown        Hyperlipidemia ICD-10-CM: E78.5  ICD-9-CM: 272.4  Unknown        Hypertension ICD-10-CM: I10  ICD-9-CM: 401.9  Unknown        CKD (chronic kidney disease) ICD-10-CM: N18.9  ICD-9-CM: 243. 9  Unknown               Plan:     Orders Placed This Encounter    WOUND CARE, DRESSING CHANGE     Upper back  Cleanse wound and periwound with wound cleanser or normal saline. Katty (may substitute equivalent collagen): cut to approximate wound size, apply to wound bed. Secure with gauze and covrsite or border foam.  Change every other day. Follow up in 2 weeks     Standing Status:   Standing     Number of Occurrences:   1        Patient understood and agrees with plan. Questions answered.     Follow-up Information     Follow up With Specialties Details Why Contact Info    [address] In 2 weeks  [place] [place] [address] [address] [phone]             Any procedures done today in the Wound Center are documented in a separate note in [facility] and made part of this record by reference.      Dictated using voice recognition software; proofread, but unrecognized errors may exist.    Signed By: Maryam Rowland MD     March 9, 2020

## 2020-03-09 NOTE — WOUND CARE
[address] [place] [address] [address] Phone: [phone] Fax: [phone] Patient: Gerda Ko MRN: [id number]  SSN: [id number] YOB: 1950  Age: 71 y.o. Sex: male Return Appointment: 2 weeks with Dedrick Montoya MD 
 
Instructions: Upper back Cleanse wound and periwound with wound cleanser or normal saline. Katty (may substitute equivalent collagen): cut to approximate wound size, apply to wound bed. Secure with gauze and covrsite or border foam. 
Change every other day. Follow up in 2 weeks Should you experience increased redness, swelling, pain, foul odor, size of wound(s), or have a temperature over 101 degrees please contact the [address] [place] [address] Road at [phone] or if after hours contact your primary care physician or go to the hospital emergency department. Signed By: Liliane Muñiz March 9, 2020

## 2020-03-09 NOTE — DISCHARGE INSTRUCTIONS
[address]  [address], [address]  Phone: [phone]  Fax: [phone]    Patient: Jake Jacobo MRN: [id number]  SSN: [id number]    YOB: 1950  Age: 71 y.o. Sex: male       Return Appointment: 2 weeks with Mikaela Romo MD    Instructions: Upper back  Cleanse wound and periwound with wound cleanser or normal saline. Katty (may substitute equivalent collagen): cut to approximate wound size, apply to wound bed. Secure with gauze and covrsite or border foam.  Change every other day. Follow up in 2 weeks    Should you experience increased redness, swelling, pain, foul odor, size of wound(s), or have a temperature over 101 degrees please contact the [address] [place] [address] Road at [phone] or if after hours contact your primary care physician or go to the hospital emergency department.     Signed By: Mihir Hoskins     March 9, 2020

## 2020-03-09 NOTE — WOUND CARE
03/09/20 6605 Wound Back Upper Date First Assessed/Time First Assessed: 02/24/20 0928   Primary Wound Type: Open incision/surgical site  Location: Back  Wound Location Orientation: Upper Dressing Status Clean, dry, and intact Non-staged Wound Description Full thickness Wound Length (cm) 1.5 cm Wound Width (cm) 3 cm Wound Depth (cm) 0.1 cm Wound Surface Area (cm^2) 4.5 cm^2 Wound Volume (cm^3) 0.45 cm^3 Change in Wound Size % 33.82 Condition of Base Epithelializing;Granulation Tissue Type Percent Red 100 Drainage Amount Small Drainage Color Serous Wound Odor None Cydney-wound Assessment Hyperpigmented Cleansing and Cleansing Agents  Normal saline Dressing Changed Changed/New Patient is on an anti-coagulant daily [id number] and xarelto

## 2020-03-23 ENCOUNTER — HOSPITAL ENCOUNTER (OUTPATIENT)
Dept: WOUND CARE | Age: 70
Discharge: HOME | End: 2020-03-23
Attending: SURGERY
Payer: MEDICARE

## 2020-03-23 VITALS
RESPIRATION RATE: 18 BRPM | TEMPERATURE: 98 F | WEIGHT: 315 LBS | HEART RATE: 88 BPM | BODY MASS INDEX: 42.66 KG/M2 | DIASTOLIC BLOOD PRESSURE: 99 MMHG | OXYGEN SATURATION: 96 % | SYSTOLIC BLOOD PRESSURE: 165 MMHG | HEIGHT: 72 IN

## 2020-03-23 PROCEDURE — 99213 OFFICE O/P EST LOW 20 MIN: CPT

## 2020-03-23 RX ORDER — NYSTATIN 100000 [USP'U]/G
POWDER TOPICAL ONCE
Status: COMPLETED | OUTPATIENT
Start: 2020-03-23 | End: 2020-03-23

## 2020-03-23 RX ADMIN — NYSTATIN: 100000 POWDER TOPICAL at 10:31

## 2020-03-23 NOTE — PROGRESS NOTES
03/23/20 0852   Wound Back Upper #1   Date First Assessed/Time First Assessed: 02/24/20 0928   Primary Wound Type: Open incision/surgical site  Location: Back  Wound Location Orientation: Upper  Wound Description: #1   Dressing Status Old drainage   Dressing Type   (Katty, dry gauze, Coversite)   Wound Length (cm) 1 cm   Wound Width (cm) 3 cm   Wound Depth (cm) 0.1 cm   Wound Surface Area (cm^2) 3 cm^2   Wound Volume (cm^3) 0.3 cm^3   Change in Wound Size % 55.88   Condition of Base Granulation;Slough   Tissue Type Percent Red 90   Tissue Type Percent Yellow 10   Drainage Amount Small   Drainage Color Serosanguinous   Wound Odor None   Cydney-wound Assessment Blanchable erythema   Cleansing and Cleansing Agents  Normal saline   Dressing Type Applied   (Katty, dry gauze, Coversite)   Procedure Tolerated Well     Patient is currently taking Xarelto and Aspirin 325 mg

## 2020-03-23 NOTE — DISCHARGE INSTRUCTIONS
[address]  [address], [address]  Phone: [phone]  Fax: [phone]    Patient: Jamal Gordillo MRN: [id number]  SSN: [id number]    YOB: 1950  Age: 71 y.o. Sex: male       Return Appointment: 1 month with Melissa Lopez MD    Instructions: Upper back  Cleanse wound and periwound with wound cleanser or normal saline. Apply Desitin mixed with Nystatin powder to wound periphery. Katty (may substitute equivalent collagen): cut to approximate wound size, apply to wound bed. Cover with gauze and cover with Covrsite. Change every other day. Return to wound center in 1 month. Should you experience increased redness, swelling, pain, foul odor, size of wound(s), or have a temperature over 101 degrees please contact the [address] [place] [address] Road at [phone] or if after hours contact your primary care physician or go to the hospital emergency department.     Signed By: Sami Rodriguez, PT, [address] [facility]     March 23, 2020

## 2020-03-23 NOTE — PROGRESS NOTES
Patient complains of the wound itching a bit we will give him some nystatin powder to use around it and will continue the same dressing technique and see him again in 1 month. Wound Center Progress Note    Patient: Soledad Lozano MRN: [id number]  SSN: [id number]    YOB: 1950  Age: 71 y.o. Sex: male      Subjective:     Chief Complaint:  Soledad Lozano is a 71 y.o. WHITE OR  male who presents with upper back wound of 2 months duration.     History of Present Illness:     See above note  Wound Caused By: non-healed surgical wound for removal of sebaceous cyst  Associated Signs and Symptoms: Itching and burning  Timing: Intermittent  Quality: Burning  Severity: 2/10  Modifying Factors: Sizable sebaceous cyst and weight  Current Wound Care: See nurses notes    Past Medical History:   Diagnosis Date    Allergic sinusitis     Cholelithiasis     CKD (chronic kidney disease)     pt denies; CMP WNL (1/2019, 8/2019)    Degenerative disc disease, cervical     Diabetes mellitus type II     oral meds, does not check BG regularly, last hgba1c- 7.0 (8/2/19)    Elevated PSA     GERD (gastroesophageal reflux disease)     managed with medication    History of prostate cancer 2002    Hx of malignant neoplasm of soft tissue 2002    Hx of syncope     Hyperlipidemia     Hypertension     Migraine     \"haven't had in years\"    Multiple nevi     Obesity, morbid ([address]     bmi- 43    Personal history of colonic polyps 2015    adenomas    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    Thromboembolus ([address] 2002; 10/2018    right leg; family hx of factor 5 leiden- pt on xarelto and ASA    Ulnar nerve compression       Past Surgical History:   Procedure Laterality Date    HX COLONOSCOPY  3/24/15; most recent 3/2019    Tyler--two asc TA, three desc hyperplastic, one sigmoid TA--3 year recall    HX LAP CHOLECYSTECTOMY  1980's    HX MALIGNANT SKIN LESION EXCISION  2002    sarcoma    [facility]    Relocate nerve in right elbow    HX PROSTATECTOMY  2002    not removed, radiation seeds     Family History   Problem Relation Age of Onset    Cancer Father         lung    Heart Attack Mother     Heart Disease Mother     Cancer Paternal Grandmother     Cancer Paternal Grandfather     Lung Disease Paternal Grandfather       Social History     Tobacco Use    Smoking status: Former Smoker     Packs/day: 1.00     Years: 10.00     Pack years: 10.00     Last attempt to quit: 1984     Years since quittin.9    Smokeless tobacco: Never Used   Substance Use Topics    Alcohol use: Yes     Comment: rarely       Prior to Admission medications    Medication Sig Start Date End Date Taking? Authorizing Provider   niacin (NIASPAN) 1,000 mg Tb24 tab TAKE 1 TABLET BY MOUTH THREE TIMES A DAY 20  Yes Provider, Historical   colesevelam (WELCHOL) 3.75 gram pwpk powder packet Take 3.75 g by mouth daily. 3/4/20  Yes Nikky Barton MD   amLODIPine-valsartan (EXFORGE) 5-320 mg per tablet Take 1 Tab by mouth daily for 90 days.  20 Yes Nikky Barton MD   atorvastatin (LIPITOR) 40 mg tablet take 1 tablet by oral route  every day 20  Yes Nikky Barton MD   dapagliflozin (FARXIGA) 10 mg tab tablet take 1 tablet by oral route  every day in the morning 20  Yes Nikky Barton MD   ezetimibe (ZETIA) 10 mg tablet take 1 tablet by oral route  every day 20  Yes Nikky Barton MD   fenofibric acid (TRILIPIX) 135 mg capsule take 1 capsule by oral route  every day 20  Yes Nikky Barton MD   fexofenadine (ALLEGRA) 180 mg tablet take 1 tablet by oral route  every day as needed 20  Yes Nikky Barton MD   metFORMIN (GLUCOPHAGE) 500 mg tablet TAKE 2 TABLETS TWICE A DAY 20  Yes Nikky Barton MD   montelukast (SINGULAIR) 10 mg tablet take 1 tablet by oral route  every day in the evening as needed 20  Yes Nikky Barton MD   omeprazole ([address]) 40 mg capsule Take 1 Cap by mouth daily for 90 days. 1/27/20 4/26/20 Yes Jack Brown MD   SITagliptin (JANUVIA) 100 mg tablet take 1 tablet by oral route  every day 1/27/20  Yes Jack Brown MD   VESICARE 5 mg tablet TAKE 1 TABLET BY MOUTH EVERY DAY 1/27/20  Yes Jack Brown MD   XARELTO 20 mg tab tablet Take 1 Tab by mouth daily (with dinner) for 90 days. Take 1 tablet by oral route every day with the evening meal 1/27/20 4/26/20 Yes Jack Brown MD   Magnesium Oxide 500 mg cap Take 1 Cap by mouth daily. Yes Provider, Historical   multivitamin (ONE A DAY) tablet take 1 tablet by oral route  every day with food   Yes Provider, Historical   cholecalciferol, vitamin D3, 4,000 unit cap Take 1 Cap by mouth daily. Yes Provider, Historical   omega 3-DHA-EPA-fish oil (FISH OIL) 1,000 mg (120 mg-180 mg) capsule Take 3 Caps by mouth daily. Yes Provider, Historical   glucosamine/chondr mejia A sod (OSTEO BI-FLEX PO) Take 1 Tab by mouth daily. Yes Provider, Historical   aspirin (ASPIRIN) 325 mg tablet Take 325 mg by mouth daily. Yes Provider, Historical   silver-calcium alginate 8 X 12 \" bndg Pack in wound q3 days 10/3/19   Jack Brown MD   miscellaneous medical supply [facility] 6x6 foam with no border dressing, 8x8 transparent dressing; pack wound with calcium alganate silver, cover with foam, secure with transparent dressing; change q3 days or if soiled; 10/3/19   Jack Brown MD     No Known Allergies     Review of Systems:  A comprehensive review of systems was negative except for that written in the History of Present Illness.      Lab Results   Component Value Date/Time    Hemoglobin A1c 7.2 (H) 02/12/2020 07:58 AM        Immunization History   Administered Date(s) Administered    Influenza High Dose Vaccine PF 01/19/2018    Influenza Vaccine 09/01/2012, 12/02/2013, 09/30/2015    Influenza Vaccine (Quad) PF 12/01/2016    Influenza Vaccine (Tri) Adjuvanted 10/03/2019    Pneumococcal Conjugate (PCV-13) 06/02/2016    Pneumococcal Polysaccharide (PPSV-23) 08/02/2018    Pneumococcal Vaccine (Unspecified Type) 10/01/2009    Td 01/01/2007    Tdap 01/19/2018    Zoster Recombinant 02/19/2020    Zoster Vaccine, Live 05/12/2015       Body mass index is 43.21 kg/m². Counseling regarding nutrition done: No     Current medications:  Current Outpatient Medications   Medication Sig Dispense Refill    niacin (NIASPAN) 1,000 mg Tb24 tab TAKE 1 TABLET BY MOUTH THREE TIMES A DAY      colesevelam (WELCHOL) 3.75 gram pwpk powder packet Take 3.75 g by mouth daily. 180 Packet 1    amLODIPine-valsartan (EXFORGE) 5-320 mg per tablet Take 1 Tab by mouth daily for 90 days. 90 Tab 1    atorvastatin (LIPITOR) 40 mg tablet take 1 tablet by oral route  every day 90 Tab 1    dapagliflozin (FARXIGA) 10 mg tab tablet take 1 tablet by oral route  every day in the morning 90 Tab 1    ezetimibe (ZETIA) 10 mg tablet take 1 tablet by oral route  every day 90 Tab 1    fenofibric acid (TRILIPIX) 135 mg capsule take 1 capsule by oral route  every day 90 Cap 1    fexofenadine (ALLEGRA) 180 mg tablet take 1 tablet by oral route  every day as needed 90 Tab 1    metFORMIN (GLUCOPHAGE) 500 mg tablet TAKE 2 TABLETS TWICE A  Tab 1    montelukast (SINGULAIR) 10 mg tablet take 1 tablet by oral route  every day in the evening as needed 90 Tab 1    omeprazole (PRILOSEC) 40 mg capsule Take 1 Cap by mouth daily for 90 days. 90 Cap 1    SITagliptin (JANUVIA) 100 mg tablet take 1 tablet by oral route  every day 90 Tab 1    VESICARE 5 mg tablet TAKE 1 TABLET BY MOUTH EVERY DAY 30 Tab 12    XARELTO 20 mg tab tablet Take 1 Tab by mouth daily (with dinner) for 90 days. Take 1 tablet by oral route every day with the evening meal 90 Tab 1    Magnesium Oxide 500 mg cap Take 1 Cap by mouth daily.       multivitamin (ONE A DAY) tablet take 1 tablet by oral route  every day with food      cholecalciferol, vitamin D3, 4,000 unit cap Take 1 Cap by mouth daily.  omega 3-DHA-EPA-fish oil (FISH OIL) 1,000 mg (120 mg-180 mg) capsule Take 3 Caps by mouth daily.  glucosamine/chondr mejia A sod (OSTEO BI-FLEX PO) Take 1 Tab by mouth daily.  aspirin (ASPIRIN) 325 mg tablet Take 325 mg by mouth daily.  silver-calcium alginate 8 X 12 \" bndg Pack in wound q3 days 10 Each 3    miscellaneous medical supply misc 6x6 foam with no border dressing, 8x8 transparent dressing; pack wound with calcium alganate silver, cover with foam, secure with transparent dressing; change q3 days or if soiled; 10 Each 3         Objective:     Physical Exam:     Visit Vitals  BP (!) 165/99 (BP 1 Location: Left arm)   Pulse 88   Temp 98 °F (36.7 °C)   Resp 18   Ht 6' (1.829 m)   Wt 144.5 kg (318 lb 9.6 oz)   SpO2 96%   BMI 43.21 kg/m²       General: well developed, well nourished, pleasant , NAD. Hygiene good  Psych: cooperative. Pleasant. No anxiety or depression. Normal mood and affect. Neuro: alert and oriented to person/place/situation. Otherwise nonfocal.  Derm: Normal turgor for age, dry skin  HEENT: Normocephalic, atraumatic. EOMI. Conjunctiva clear. No scleral icterus. Neck: Normal range of motion. No masses. Chest: Good air entry bilaterally. Respirations nonlabored  Cardio: Normal heart sounds,no rubs, murmurs or gallops  Abdomen: Soft, nontender, nondistended, normoactive bowel sounds  Lower extremities: color normal; temperature normal. Hair growth is not present. Calves are supple, nontender, approximately equally sized in comparison.  Capillary refill <3 sec            Ulcer Description:   Wound Back Upper #1 (Active)   Dressing Status Old drainage 3/23/2020  8:52 AM   Non-staged Wound Description Full thickness 3/9/2020  9:09 AM   Wound Length (cm) 1 cm 3/23/2020  8:52 AM   Wound Width (cm) 3 cm 3/23/2020  8:52 AM   Wound Depth (cm) 0.1 cm 3/23/2020  8:52 AM   Wound Surface Area (cm^2) 3 cm^2 3/23/2020  8:52 AM   Wound Volume (cm^3) 0.3 cm^3 3/23/2020 8:52 AM   Change in Wound Size % 55.88 3/23/2020  8:52 AM   Condition of Base Granulation;Slough 3/23/2020  8:52 AM   Epithelialization (%) 10 3/2/2020  8:54 AM   Tissue Type Percent Red 90 3/23/2020  8:52 AM   Tissue Type Percent Yellow 10 3/23/2020  8:52 AM   Drainage Amount Small 3/23/2020  8:52 AM   Drainage Color Serosanguinous 3/23/2020  8:52 AM   Wound Odor None 3/23/2020  8:52 AM   Cydney-wound Assessment Blanchable erythema 3/23/2020  8:52 AM   Cleansing and Cleansing Agents  Normal saline 3/23/2020  8:52 AM   Dressing Changed Changed/New 3/9/2020  9:09 AM   Procedure Tolerated Well 3/23/2020  8:52 AM   Number of days: 28       [REMOVED] Wound Back Left;Upper (Removed)   Number of days: 213         Data Review:   No results found for this or any previous visit (from the past 24 hour(s)). Assessment:     71 y.o. male with upper back postop ulcer.     Problem List  Date Reviewed: 3/4/2020          Codes Class Noted    Hx of excision of epidermal inclusion cyst ICD-10-CM: Z98.890, Z87.2  ICD-9-CM: V15.29  8/29/2019    Overview Signed 8/29/2019  8:43 AM by Orquidea Porras MD     8/23/19, Dr. Gagan Lay; 10 cm x6 cm x6 cm epidermal inclusion cyst;             Benign neoplasm of sigmoid colon ICD-10-CM: D12.5  ICD-9-CM: 211.3  8/14/2019    Overview Signed 8/15/2019  8:39 AM by Orquidea Porras MD     Colonoscopy 4/2019             Gastro-esophageal reflux disease with esophagitis ICD-10-CM: K21.0  ICD-9-CM: 530.11  8/14/2019        Factor 5 Leiden mutation, heterozygous ([facility] ICD-10-CM: P44.89  ICD-9-CM: 289.81  1/19/2018    Overview Addendum 8/15/2019  8:40 AM by MD Dr. Sia Winslow, takes  mg every day, Xarelto 20 mg every day              History of DVT (deep vein thrombosis) ICD-10-CM: C97.665  ICD-9-CM: V12.51  3/24/2016    Overview Addendum 1/19/2018  2:07 PM by MD Dr. Sia Winslow  Overview:   Associated with low homocystine and heterozygote for factor V Leiden. Last Assessment & Plan:   No folic acid because of his prostate cancer. No anticoagulation. Overview:   Associated with low homocystine and heterozygote for factor V Leiden. Last Assessment & Plan:   No folic acid because of his prostate cancer. No anticoagulation. Overview:   Associated with low homocystine and heterozygote for factor V Leiden. Last Assessment & Plan:   No folic acid because of his prostate cancer. No anticoagulation. Dx 2002. Allergic rhinitis ICD-10-CM: J30.9  ICD-9-CM: 477.9  12/2/2013        History of prostate cancer ICD-10-CM: Z85.46  ICD-9-CM: V10.46  12/2/2013    Overview Addendum 1/19/2018  1:55 PM by MD Dr. Darryl Higgins  Overview:   Seed implant therapy on December 29, 2003 indolent PSA at 0.055 in March 2015 and 0.066 in March 2016    Last Assessment & Plan:   We did not draw blood work this year. The PSA will be done along with the panel of lab work he has with Dr. Quintin Gardiner. There are no signs or symptoms on today's history and physical exam to suggest progression of the prostate cancer. I believe that remains reasonable to monitor him once a year and check the PSA once a year. I will see him back in a year and a further blood work to Dr. Quintin Gardiner. Overview:   Seed implant therapy on December 29, 2003 indolent PSA at 0.055 in March 2015 and 0.066 in March 2016    Last Assessment & Plan:   We did not draw blood work this year. The PSA will be done along with the panel of lab work he has with Dr. Quintin Gardiner. There are no signs or symptoms on today's history and physical exam to suggest progression of the prostate cancer. I believe that remains reasonable to monitor him once a year and check the PSA once a year. I will see him back in a year and a further blood work to Dr. Quintin Gardiner. Dr. Darryl Martínez.              History of sarcoma of soft tissue ICD-10-CM: G05.322  ICD-9-CM: V10.89  12/2/2013    Overview Addendum 1/19/2018  1:56 PM by MD Dr. Valeriy Rodriguez  Overview:   Soft tissue sarcoma, presenting in January 2002, over the right posterior pelvis, status post neoadjuvant chemotherapy followed by surgery at [facility] and adjuvant chemotherapy with Adriamycin plus adjuvant radiation therapy. Continuing complete remission through March 2015 with annual follow-up. Last Assessment & Plan:   Physical exam remains negative. We'll check him once yearly we see him for prostate cancer. Overview:   Soft tissue sarcoma, presenting in January 2002, over the right posterior pelvis, status post neoadjuvant chemotherapy followed by surgery at [facility] and adjuvant chemotherapy with Adriamycin plus adjuvant radiation therapy. Continuing complete remission through March 2015 with annual follow-up. Last Assessment & Plan:   Physical exam remains negative. We'll check him once yearly we see him for prostate cancer. Dr. Valeriy Chou. Diabetes mellitus type 2, controlled ([facility] ICD-10-CM: E11.9  ICD-9-CM: 250.00  9/10/2013        Obesity, morbid (HCC) ICD-10-CM: E66.01  ICD-9-CM: 278.01  Unknown        Degenerative disc disease, cervical ICD-10-CM: M50.30  ICD-9-CM: 722.4  Unknown        Hyperlipidemia ICD-10-CM: E78.5  ICD-9-CM: 272.4  Unknown        Hypertension ICD-10-CM: I10  ICD-9-CM: 401.9  Unknown        CKD (chronic kidney disease) ICD-10-CM: N18.9  ICD-9-CM: 901. 9  Unknown               Plan:     No orders of the defined types were placed in this encounter. Patient understood and agrees with plan. Questions answered. Follow-up Information    None            Any procedures done today in the [address] [facility] are documented in a separate note in [facility] and made part of this record by reference.      Dictated using voice recognition software; proofread, but unrecognized errors may exist.    Signed By: Nissa Kendall MD     March 23, 2020

## 2020-04-20 ENCOUNTER — HOSPITAL ENCOUNTER (OUTPATIENT)
Dept: WOUND CARE | Age: 70
Discharge: HOME | End: 2020-04-20
Attending: SURGERY
Payer: MEDICARE

## 2020-04-20 VITALS
WEIGHT: 314 LBS | SYSTOLIC BLOOD PRESSURE: 125 MMHG | TEMPERATURE: 98.1 F | BODY MASS INDEX: 42.59 KG/M2 | HEART RATE: 108 BPM | DIASTOLIC BLOOD PRESSURE: 80 MMHG

## 2020-04-20 PROCEDURE — 99213 OFFICE O/P EST LOW 20 MIN: CPT

## 2020-04-20 PROCEDURE — 87205 SMEAR GRAM STAIN: CPT

## 2020-04-20 RX ORDER — AMOXICILLIN AND CLAVULANATE POTASSIUM 562.5; 437.5; 62.5 MG/1; MG/1; MG/1
1 TABLET, FILM COATED, EXTENDED RELEASE ORAL 2 TIMES DAILY
Qty: 20 TAB | Refills: 0 | OUTPATIENT
Start: 2020-04-20 | End: 2020-04-30

## 2020-04-20 NOTE — PROGRESS NOTES
04/20/20 0825   Wound Back Lower   Date First Assessed/Time First Assessed: 04/20/20 0827   Present on Hospital Admission: Yes  Primary Wound Type: Abscess  Location: Back  Wound Location Orientation: Lower   Dressing Status Old drainage   Dressing Type   (Coversite)   Non-staged Wound Description Full thickness   Wound Length (cm) 1 cm   Wound Width (cm) 1.7 cm   Wound Depth (cm) 0.1 cm   Wound Surface Area (cm^2) 1.7 cm^2   Wound Volume (cm^3) 0.17 cm^3   Condition of Base Granulation   Tissue Type Percent Red 100   Drainage Amount Small   Drainage Color Serosanguinous   Wound Odor None   Cydney-wound Assessment Edema   Cleansing and Cleansing Agents  Soap and water   Wound Back Upper #1   Date First Assessed/Time First Assessed: 02/24/20 0928   Primary Wound Type: Open incision/surgical site  Location: Back  Wound Location Orientation: Upper  Wound Description: #1   Dressing Status Old drainage   Dressing Type   (Katty, Coversite)   Non-staged Wound Description Full thickness   Wound Length (cm) 0.3 cm   Wound Width (cm) 0.5 cm   Wound Depth (cm) 0.1 cm   Wound Surface Area (cm^2) 0.15 cm^2   Wound Volume (cm^3) 0.02 cm^3   Change in Wound Size % 97.79   Condition of Base Granulation;Epithelializing   Epithelialization (%) 90   Tissue Type Percent Red 10   Drainage Amount Scant   Drainage Color Serosanguinous   Wound Odor None   Cydney-wound Assessment Blanchable erythema   Cleansing and Cleansing Agents  Soap and water           Patient is currently taking Xarelto and Aspirin 325 mg

## 2020-04-20 NOTE — DISCHARGE INSTRUCTIONS
[address]  [address], [address]  Phone: [phone]  Fax: [phone]    Patient: Torsten Solitario MRN: [id number]  SSN: [id number]    YOB: 1950  Age: 71 y.o. Sex: male       Return Appointment: 1 week with Khris Cueto MD    Instructions: Upper and lower back wounds  Cleanse wound and periwound with wound cleanser or normal saline. Apply Desitin mixed with Nystatin powder to wound periphery. Katty (may substitute equivalent collagen): cut to approximate wound size, apply to wound bed. Cover with gauze and secure with Covrsite. Change every other day. Apply warm compresses to lower back wound daily with dressing in place. Return to wound center in 1 week    Wound culture taken today  Antibiotic prescribed. Obtain antibiotic and begin taking as prescribed. Should you experience increased redness, swelling, pain, foul odor, size of wound(s), or have a temperature over 101 degrees please contact the [address] [place] [address] Road at [phone] or if after hours contact your primary care physician or go to the hospital emergency department.     Signed By: Salvaodr Loya PT, [facility]     April 20, 2020

## 2020-04-20 NOTE — PROGRESS NOTES
Is developed another place on his low back which is quite painful and is obviously a undrained sebaceous cyst.  This was cultured he is started on Augmentin and warm soaks and will be seen again in 1 week when we probably can drain this. The upper and the upper wound looks better and will continue to drain dressed with dressed with Katty. He will be seen again in 1 week             [address] [facility],[address] Progress Note    Patient: Tina Flores MRN: [id number]  SSN: [id number]    YOB: 1950  Age: 71 y.o. Sex: male      Subjective:     Chief Complaint:  Tina Flores is a 71 y.o. WHITE OR  male who presents with back x2 wound of 2 months duration.     History of Present Illness:     See above note  Wound Caused By: previous infection  Associated Signs and Symptoms: Pain and a little drainage  Timing: Intermittent  Quality: Aching, Burning, Cramping  Severity: 4/10  Modifying Factors: Obesity  Current Wound Care: See nurses notes    Past Medical History:   Diagnosis Date    Allergic sinusitis     Cholelithiasis     CKD (chronic kidney disease)     pt denies; CMP WNL (1/2019, 8/2019)    Degenerative disc disease, cervical     Diabetes mellitus type II     oral meds, does not check BG regularly, last hgba1c- 7.0 (8/2/19)    Elevated PSA     GERD (gastroesophageal reflux disease)     managed with medication    History of prostate cancer 2002    Hx of malignant neoplasm of soft tissue 2002    Hx of syncope     Hyperlipidemia     Hypertension     Migraine     \"haven't had in years\"    Multiple nevi     Obesity, morbid ([address]     bmi- 42    Personal history of colonic polyps 2015    adenomas    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    Thromboembolus ([address] 2002; 10/2018    right leg; family hx of factor 5 leiden- pt on xarelto and ASA    Ulnar nerve compression       Past Surgical History:   Procedure Laterality Date    HX COLONOSCOPY  3/24/15; most recent 3/2019 [place]--two asc TA, three desc hyperplastic, one sigmoid TA--3 year recall    HX LAP CHOLECYSTECTOMY      HX MALIGNANT SKIN LESION EXCISION      sarcoma    HX ORTHOPAEDIC  1985    Relocate nerve in right elbow    HX PROSTATECTOMY      not removed, radiation seeds     Family History   Problem Relation Age of Onset    Cancer Father         lung    Heart Attack Mother     Heart Disease Mother     Cancer Paternal Grandmother     Cancer Paternal Grandfather     Lung Disease Paternal Grandfather       Social History     Tobacco Use    Smoking status: Former Smoker     Packs/day: 1.00     Years: 10.00     Pack years: 10.00     Last attempt to quit: 1984     Years since quittin.9    Smokeless tobacco: Never Used   Substance Use Topics    Alcohol use: Yes     Comment: rarely       Prior to Admission medications    Medication Sig Start Date End Date Taking? Authorizing Provider   niacin (NIASPAN) 1,000 mg Tb24 tab TAKE 1 TABLET BY MOUTH THREE TIMES A DAY 20   Provider, Alycia   [facility] [employer]) 3.75 gram pwpk powder packet Take 3.75 g by mouth daily. 3/4/20   Guiliana Sarabia MD   amLODIPine-valsartan (EXFORGE) 5-320 mg per tablet Take 1 Tab by mouth daily for 90 days.  20  Giuliana Sarabia MD   atorvastatin (LIPITOR) 40 mg tablet take 1 tablet by oral route  every day 20   Giuliana Sarabia MD   dapagliflozin (FARXIGA) 10 mg tab tablet take 1 tablet by oral route  every day in the morning 20   Giuliana Sarabia MD   ezetimibe (ZETIA) 10 mg tablet take 1 tablet by oral route  every day 20   Giuliana Sarabia MD   fenofibric acid (TRILIPIX) 135 mg capsule take 1 capsule by oral route  every day 20   Giuliana Sarabia MD   fexofenadine (ALLEGRA) 180 mg tablet take 1 tablet by oral route  every day as needed 20   Giuliana Sarabia MD   metFORMIN (GLUCOPHAGE) 500 mg tablet TAKE 2 TABLETS TWICE A DAY 20   MD ashwini Schultz (SINGULAIR) 10 mg tablet take 1 tablet by oral route  every day in the evening as needed 1/27/20   Jamel Almonte MD   omeprazole (PRILOSEC) 40 mg capsule Take 1 Cap by mouth daily for 90 days. 1/27/20 4/26/20  Jamel Almonte MD   SITagliptin (JANUVIA) 100 mg tablet take 1 tablet by oral route  every day 1/27/20   Jamel Almonte MD   VESICARE 5 mg tablet TAKE 1 TABLET BY MOUTH EVERY DAY 1/27/20   Jamel Almonte MD   XARELTO 20 mg tab tablet Take 1 Tab by mouth daily (with dinner) for 90 days. Take 1 tablet by oral route every day with the evening meal 1/27/20 4/26/20  Jamel Almonte MD   silver-calcium alginate 8 X 12 \" bndg Pack in wound q3 days 10/3/19   Jamel Almonte MD   [facility] 6x6 foam with no border dressing, 8x8 transparent dressing; pack wound with calcium alganate silver, cover with foam, secure with transparent dressing; change q3 days or if soiled; 10/3/19   Jamel Almonte MD   Magnesium Oxide 500 mg cap Take 1 Cap by mouth daily. Provider, Historical   multivitamin (ONE A DAY) tablet take 1 tablet by oral route  every day with food    Provider, Historical   cholecalciferol, vitamin D3, 4,000 unit cap Take 1 Cap by mouth daily. Provider, Historical   omega 3-DHA-EPA-fish oil (FISH OIL) 1,000 mg (120 mg-180 mg) capsule Take 3 Caps by mouth daily. Provider, Historical   glucosamine/chondr mejia A sod (OSTEO BI-FLEX PO) Take 1 Tab by mouth daily. Provider, Historical   aspirin (ASPIRIN) 325 mg tablet Take 325 mg by mouth daily. Provider, Historical     No Known Allergies     Review of Systems:  A comprehensive review of systems was negative except for that written in the History of Present Illness.      Lab Results   Component Value Date/Time    Hemoglobin A1c 7.2 (H) 02/12/2020 07:58 AM        Immunization History   Administered Date(s) Administered    Influenza High Dose Vaccine PF 01/19/2018    Influenza Vaccine 09/01/2012, 12/02/2013, 09/30/2015    Influenza Vaccine (Quad) PF 12/01/2016    Influenza Vaccine (Tri) Adjuvanted 10/03/2019    Pneumococcal Conjugate (PCV-13) 06/02/2016    Pneumococcal Polysaccharide (PPSV-23) 08/02/2018    Pneumococcal Vaccine (Unspecified Type) 10/01/2009    Td 01/01/2007    Tdap 01/19/2018    Zoster Recombinant 02/19/2020    Zoster Vaccine, Live 05/12/2015       Body mass index is 42.59 kg/m². Counseling regarding nutrition done: No     Current medications:  Current Outpatient Medications   Medication Sig Dispense Refill    niacin (NIASPAN) 1,000 mg Tb24 tab TAKE 1 TABLET BY MOUTH THREE TIMES A DAY      colesevelam (WELCHOL) 3.75 gram pwpk powder packet Take 3.75 g by mouth daily. 180 Packet 1    amLODIPine-valsartan (EXFORGE) 5-320 mg per tablet Take 1 Tab by mouth daily for 90 days. 90 Tab 1    atorvastatin (LIPITOR) 40 mg tablet take 1 tablet by oral route  every day 90 Tab 1    dapagliflozin (FARXIGA) 10 mg tab tablet take 1 tablet by oral route  every day in the morning 90 Tab 1    ezetimibe (ZETIA) 10 mg tablet take 1 tablet by oral route  every day 90 Tab 1    fenofibric acid (TRILIPIX) 135 mg capsule take 1 capsule by oral route  every day 90 Cap 1    fexofenadine (ALLEGRA) 180 mg tablet take 1 tablet by oral route  every day as needed 90 Tab 1    metFORMIN (GLUCOPHAGE) 500 mg tablet TAKE 2 TABLETS TWICE A  Tab 1    montelukast (SINGULAIR) 10 mg tablet take 1 tablet by oral route  every day in the evening as needed 90 Tab 1    omeprazole (PRILOSEC) 40 mg capsule Take 1 Cap by mouth daily for 90 days. 90 Cap 1    SITagliptin (JANUVIA) 100 mg tablet take 1 tablet by oral route  every day 90 Tab 1    VESICARE 5 mg tablet TAKE 1 TABLET BY MOUTH EVERY DAY 30 Tab 12    XARELTO 20 mg tab tablet Take 1 Tab by mouth daily (with dinner) for 90 days.  Take 1 tablet by oral route every day with the evening meal 90 Tab 1    silver-calcium alginate 8 X 12 \" bndg Pack in wound q3 days 10 Each 3    miscellaneous medical supply [facility] 6x6 foam with no border dressing, 8x8 transparent dressing; pack wound with calcium alganate silver, cover with foam, secure with transparent dressing; change q3 days or if soiled; 10 Each 3    Magnesium Oxide 500 mg cap Take 1 Cap by mouth daily.  multivitamin (ONE A DAY) tablet take 1 tablet by oral route  every day with food      cholecalciferol, vitamin D3, 4,000 unit cap Take 1 Cap by mouth daily.  omega 3-DHA-EPA-fish oil (FISH OIL) 1,000 mg (120 mg-180 mg) capsule Take 3 Caps by mouth daily.  glucosamine/chondr mejia A sod (OSTEO BI-FLEX PO) Take 1 Tab by mouth daily.  aspirin (ASPIRIN) 325 mg tablet Take 325 mg by mouth daily. Objective:     Physical Exam:     Visit Vitals  /80 (BP 1 Location: Right arm)   Pulse (!) 108   Temp 98.1 °F (36.7 °C)   Wt 142.4 kg (314 lb)   BMI 42.59 kg/m²       General: well developed, well nourished, pleasant , NAD. Hygiene good  Psych: cooperative. Pleasant. No anxiety or depression. Normal mood and affect. Neuro: alert and oriented to person/place/situation. Otherwise nonfocal.  Derm: Normal turgor for age, dry skin  HEENT: Normocephalic, atraumatic. EOMI. Conjunctiva clear. No scleral icterus. Neck: Normal range of motion. No masses. Chest: Good air entry bilaterally. Respirations nonlabored  Cardio: Normal heart sounds,no rubs, murmurs or gallops  Abdomen: Soft, nontender, nondistended, normoactive bowel sounds  Lower extremities: color normal; temperature normal. Hair growth is not present. Calves are supple, nontender, approximately equally sized in comparison.  Capillary refill <3 sec            Ulcer Description:   Wound Back Upper #1 (Active)   Dressing Status Old drainage 4/20/2020  8:25 AM   Non-staged Wound Description Full thickness 4/20/2020  8:25 AM   Wound Length (cm) 0.3 cm 4/20/2020  8:25 AM   Wound Width (cm) 0.5 cm 4/20/2020  8:25 AM   Wound Depth (cm) 0.1 cm 4/20/2020  8:25 AM   Wound Surface Area (cm^2) 0.15 cm^2 4/20/2020  8:25 AM   Wound Volume (cm^3) 0.02 cm^3 4/20/2020  8:25 AM   Change in Wound Size % 97.79 4/20/2020  8:25 AM   Condition of Base Granulation;Epithelializing 4/20/2020  8:25 AM   Epithelialization (%) 90 4/20/2020  8:25 AM   Tissue Type Percent Red 10 4/20/2020  8:25 AM   Tissue Type Percent Yellow 10 3/23/2020  8:52 AM   Drainage Amount Scant 4/20/2020  8:25 AM   Drainage Color Serosanguinous 4/20/2020  8:25 AM   Wound Odor None 4/20/2020  8:25 AM   Cydney-wound Assessment Blanchable erythema 4/20/2020  8:25 AM   Cleansing and Cleansing Agents  Soap and water 4/20/2020  8:25 AM   Dressing Changed Changed/New 3/9/2020  9:09 AM   Procedure Tolerated Well 3/23/2020  8:52 AM   Number of days: 56       Wound Back Lower (Active)   Dressing Status Old drainage 4/20/2020  8:25 AM   Non-staged Wound Description Full thickness 4/20/2020  8:25 AM   Wound Length (cm) 1 cm 4/20/2020  8:25 AM   Wound Width (cm) 1.7 cm 4/20/2020  8:25 AM   Wound Depth (cm) 0.1 cm 4/20/2020  8:25 AM   Wound Surface Area (cm^2) 1.7 cm^2 4/20/2020  8:25 AM   Wound Volume (cm^3) 0.17 cm^3 4/20/2020  8:25 AM   Condition of Base Granulation 4/20/2020  8:25 AM   Tissue Type Percent Red 100 4/20/2020  8:25 AM   Drainage Amount Small 4/20/2020  8:25 AM   Drainage Color Serosanguinous 4/20/2020  8:25 AM   Wound Odor None 4/20/2020  8:25 AM   Cydney-wound Assessment Edema 4/20/2020  8:25 AM   Cleansing and Cleansing Agents  Soap and water 4/20/2020  8:25 AM   Number of days: 0       [REMOVED] Wound Back Left;Upper (Removed)   Number of days: 213         Data Review:   No results found for this or any previous visit (from the past 24 hour(s)). Assessment:     71 y.o. male with back x2 postoperative ulcer.     Problem List  Date Reviewed: 3/4/2020          Codes Class Noted    Hx of excision of epidermal inclusion cyst ICD-10-CM: Z98.890, Z87.2  ICD-9-CM: V15.29  8/29/2019    Overview Signed 8/29/2019  8:43 AM by Carlos Ledezma, Tracy Soares MD     8/23/19, Dr. Margo Cage; 10 cm x6 cm x6 cm epidermal inclusion cyst;             Benign neoplasm of sigmoid colon ICD-10-CM: D12.5  ICD-9-CM: 211.3  8/14/2019    Overview Signed 8/15/2019  8:39 AM by Denise Tidwell MD     Colonoscopy 4/2019             Gastro-esophageal reflux disease with esophagitis ICD-10-CM: K21.0  ICD-9-CM: 530.11  8/14/2019        Factor 5 Leiden mutation, heterozygous ([address] ICD-10-CM: M62.45  ICD-9-CM: 289.81  1/19/2018    Overview Addendum 8/15/2019  8:40 AM by MD Dr. Jay Mason, takes  mg every day, Xarelto 20 mg every day              History of DVT (deep vein thrombosis) ICD-10-CM: R88.809  ICD-9-CM: V12.51  3/24/2016    Overview Addendum 1/19/2018  2:07 PM by MD Dr. Jay Mason  Overview:   Associated with low homocystine and heterozygote for factor V Leiden. Last Assessment & Plan:   No folic acid because of his prostate cancer. No anticoagulation. Overview:   Associated with low homocystine and heterozygote for factor V Leiden. Last Assessment & Plan:   No folic acid because of his prostate cancer. No anticoagulation. Overview:   Associated with low homocystine and heterozygote for factor V Leiden. Last Assessment & Plan:   No folic acid because of his prostate cancer. No anticoagulation. Dx 2002. Allergic rhinitis ICD-10-CM: J30.9  ICD-9-CM: 477.9  12/2/2013        History of prostate cancer ICD-10-CM: Z85.46  ICD-9-CM: V10.46  12/2/2013    Overview Addendum 1/19/2018  1:55 PM by MD Dr. Jay Mason  Overview:   Seed implant therapy on December 29, 2003 indolent PSA at 0.055 in March 2015 and 0.066 in March 2016    Last Assessment & Plan:   We did not draw blood work this year. The PSA will be done along with the panel of lab work he has with Dr. Alejandrina You. There are no signs or symptoms on today's history and physical exam to suggest progression of the prostate cancer.   I believe that remains reasonable to monitor him once a year and check the PSA once a year. I will see him back in a year and a further blood work to Dr. Nick Varela. Overview:   Seed implant therapy on December 29, 2003 indolent PSA at 0.055 in March 2015 and 0.066 in March 2016    Last Assessment & Plan:   We did not draw blood work this year. The PSA will be done along with the panel of lab work he has with Dr. Nick Varela. There are no signs or symptoms on today's history and physical exam to suggest progression of the prostate cancer. I believe that remains reasonable to monitor him once a year and check the PSA once a year. I will see him back in a year and a further blood work to Dr. Nick Varela. Dr. Sekou Woodson. History of sarcoma of soft tissue ICD-10-CM: Z85.831  ICD-9-CM: V10.89  12/2/2013    Overview Addendum 1/19/2018  1:56 PM by MD Dr. Sekou Schultz  Overview:   Soft tissue sarcoma, presenting in January 2002, over the right posterior pelvis, status post neoadjuvant chemotherapy followed by surgery at [facility] and adjuvant chemotherapy with Adriamycin plus adjuvant radiation therapy. Continuing complete remission through March 2015 with annual follow-up. Last Assessment & Plan:   Physical exam remains negative. We'll check him once yearly we see him for prostate cancer. Overview:   Soft tissue sarcoma, presenting in January 2002, over the right posterior pelvis, status post neoadjuvant chemotherapy followed by surgery at [facility] and adjuvant chemotherapy with Adriamycin plus adjuvant radiation therapy. Continuing complete remission through March 2015 with annual follow-up. Last Assessment & Plan:   Physical exam remains negative. We'll check him once yearly we see him for prostate cancer. Dr. Sekou Woodson.              Diabetes mellitus type 2, controlled ([facility] ICD-10-CM: E11.9  ICD-9-CM: 250.00  9/10/2013        Obesity, morbid ([facility] [address] ICD-10-CM: E66.01  ICD-9-CM: 278.01  Unknown Degenerative disc disease, cervical ICD-10-CM: M50.30  ICD-9-CM: 722.4  Unknown        Hyperlipidemia ICD-10-CM: E78.5  ICD-9-CM: 272.4  Unknown        Hypertension ICD-10-CM: I10  ICD-9-CM: 401.9  Unknown        CKD (chronic kidney disease) ICD-10-CM: N18.9  ICD-9-CM: 486. 9  Unknown               Plan:     Orders Placed This Encounter    CULTURE, WOUND W GRAM STAIN     Standing Status:   Standing     Number of Occurrences:   1    WOUND CARE, DRESSING CHANGE     Upper and lower back wounds  Cleanse wound and periwound with wound cleanser or normal saline. Apply Desitin mixed with Nystatin powder to wound periphery. Katty (may substitute equivalent collagen): cut to approximate wound size, apply to wound bed. Cover with gauze and secure with Covrsite. Change every other day. Apply warm compresses to lower back wound daily with dressing in place. Return to wound center in 1 week    Wound culture taken today  Antibiotic prescribed. Obtain antibiotic and begin taking as prescribed. Standing Status:   Standing     Number of Occurrences:   1        Patient understood and agrees with plan. Questions answered. Follow-up Information     Follow up With Specialties Details Why Contact Info    [address] In 1 week For wound re-check [facility] [address] [id number] [phone]             Any procedures done today in the [address] [facility],[address] are documented in a separate note in [address] [facility] and made part of this record by reference.      Dictated using voice recognition software; proofread, but unrecognized errors may exist.    Signed By: Drea Sampson MD     April 20, 2020

## 2020-04-20 NOTE — WOUND CARE
[address] [place] [address] [address], [address] Phone: [phone] Fax: [phone] Patient: Regina Arreguin MRN: [id number]  SSN: [id number] YOB: 1950  Age: 71 y.o. Sex: male Return Appointment: 1 week with Nick Hernandez MD 
 
Instructions: Upper and lower back wounds Cleanse wound and periwound with wound cleanser or normal saline. Apply Desitin mixed with Nystatin powder to wound periphery. Katty (may substitute equivalent collagen): cut to approximate wound size, apply to wound bed. Cover with gauze and secure with Covrsite. Change every other day. Apply warm compresses to lower back wound daily with dressing in place. Return to wound center in 1 week Wound culture taken today Antibiotic prescribed. Obtain antibiotic and begin taking as prescribed. Should you experience increased redness, swelling, pain, foul odor, size of wound(s), or have a temperature over 101 degrees please contact the [address] [place] [address] Road at [phone] or if after hours contact your primary care physician or go to the hospital emergency department. Signed By: Yue Sanchez, PT, [address] [facility] April 20, 2020

## 2020-04-23 LAB
BACTERIA SPEC CULT: NORMAL
GRAM STN SPEC: NORMAL
GRAM STN SPEC: NORMAL
SERVICE CMNT-IMP: NORMAL

## 2020-04-27 ENCOUNTER — HOSPITAL ENCOUNTER (OUTPATIENT)
Dept: WOUND CARE | Age: 70
Discharge: HOME | End: 2020-04-27
Attending: SURGERY
Payer: MEDICARE

## 2020-04-27 VITALS
WEIGHT: 315 LBS | DIASTOLIC BLOOD PRESSURE: 92 MMHG | BODY MASS INDEX: 42.66 KG/M2 | TEMPERATURE: 97.3 F | SYSTOLIC BLOOD PRESSURE: 135 MMHG | HEART RATE: 87 BPM | HEIGHT: 72 IN

## 2020-04-27 PROCEDURE — 17250 CHEM CAUT OF GRANLTJ TISSUE: CPT

## 2020-04-27 RX ORDER — SILVER NITRATE 38.21; 12.74 MG/1; MG/1
1 STICK TOPICAL ONCE
Status: COMPLETED | OUTPATIENT
Start: 2020-04-27 | End: 2020-04-27

## 2020-04-27 RX ADMIN — SILVER NITRATE 1 APPLICATOR: 38.21; 12.74 STICK TOPICAL at 09:00

## 2020-04-27 NOTE — PROGRESS NOTES
Mr. Ramirez Pump has a second sebaceous cyst down below the first 1 it is open and cultured no when we cultured it it was negative. Today a silver nitrate stick is inserted through the small opening and the cyst wall was cauterized continue with the same dressings and see again in 2 weeks                 Wound Center Progress Note    Patient: Regina Arreguin MRN: [id number]  SSN: [id number]    YOB: 1950  Age: 71 y.o. Sex: male      Subjective:     Chief Complaint:  Regina Arreguin is a 71 y.o. WHITE OR  male who presents with upper and lower back wound of 3 months duration.     History of Present Illness:     See above note  Wound Caused By: previous infection  Associated Signs and Symptoms: Mild pain  Timing: Intermittent  Quality: Burning  Severity: 4/10  Modifying Factors: Obesity and multiple areas of comedones  Current Wound Care: See nurses notes  Past Medical History:   Diagnosis Date    Allergic sinusitis     Cholelithiasis     CKD (chronic kidney disease)     pt denies; CMP WNL (1/2019, 8/2019)    Degenerative disc disease, cervical     Diabetes mellitus type II     oral meds, does not check BG regularly, last hgba1c- 7.0 (8/2/19)    Elevated PSA     GERD (gastroesophageal reflux disease)     managed with medication    History of prostate cancer 2002    Hx of malignant neoplasm of soft tissue 2002    Hx of syncope     Hyperlipidemia     Hypertension     Migraine     \"haven't had in years\"    Multiple nevi     Obesity, morbid ([address]     bmi- 42    Personal history of colonic polyps 2015    adenomas    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    Thromboembolus ([address] 2002; 10/2018    right leg; family hx of factor 5 leiden- pt on xarelto and ASA    Ulnar nerve compression       Past Surgical History:   Procedure Laterality Date    HX COLONOSCOPY  3/24/15; most recent 3/2019    Tyler--two asc TA, three desc hyperplastic, one sigmoid TA--3 year recall    HX LAP CHOLECYSTECTOMY      HX MALIGNANT SKIN LESION EXCISION      sarcoma    HX ORTHOPAEDIC  1985    Relocate nerve in right elbow    HX PROSTATECTOMY      not removed, radiation seeds     Family History   Problem Relation Age of Onset    Cancer Father         lung    Heart Attack Mother     Heart Disease Mother     Cancer Paternal Grandmother     Cancer Paternal Grandfather     Lung Disease Paternal Grandfather       Social History     Tobacco Use    Smoking status: Former Smoker     Packs/day: 1.00     Years: 10.00     Pack years: 10.00     Last attempt to quit: 1984     Years since quittin.0    Smokeless tobacco: Never Used   Substance Use Topics    Alcohol use: Yes     Comment: rarely       Prior to Admission medications    Medication Sig Start Date End Date Taking? Authorizing Provider   niacin (NIASPAN) 1,000 mg Tb24 tab TAKE 1 TABLET BY MOUTH THREE TIMES A DAY 20   Provider, Alycia   [facility]) 3.75 gram pwpk powder packet Take 3.75 g by mouth daily. 3/4/20   Ashley Ramos MD   amLODIPine-valsartan (EXFORGE) 5-320 mg per tablet Take 1 Tab by mouth daily for 90 days.  20  Ashley Ramos MD   atorvastatin (LIPITOR) 40 mg tablet take 1 tablet by oral route  every day 20   Ashley Ramos MD   dapagliflozin (FARXIGA) 10 mg tab tablet take 1 tablet by oral route  every day in the morning 20   Ashley Ramos MD   ezetimibe (ZETIA) 10 mg tablet take 1 tablet by oral route  every day 20   Ashley Ramos MD   fenofibric acid (TRILIPIX) 135 mg capsule take 1 capsule by oral route  every day 20   Ashley Ramos MD   fexofenadine (ALLEGRA) 180 mg tablet take 1 tablet by oral route  every day as needed 20   Ashley Ramos MD   metFORMIN (GLUCOPHAGE) 500 mg tablet TAKE 2 TABLETS TWICE A DAY 20   Ashley Ramos MD   montelukast (SINGULAIR) 10 mg tablet take 1 tablet by oral route  every day in the evening as needed 1/27/20   Pio Valera MD   omeprazole (PRILOSEC) 40 mg capsule Take 1 Cap by mouth daily for 90 days. 1/27/20 4/26/20  Pio Valera MD   SITagliptin (JANUVIA) 100 mg tablet take 1 tablet by oral route  every day 1/27/20   Pio Valera MD   VESICARE 5 mg tablet TAKE 1 TABLET BY MOUTH EVERY DAY 1/27/20   Pio Valera MD   XARELTO 20 mg tab tablet Take 1 Tab by mouth daily (with dinner) for 90 days. Take 1 tablet by oral route every day with the evening meal 1/27/20 4/26/20  Pio Valera MD   silver-calcium alginate 8 X 12 \" bndg Pack in wound q3 days 10/3/19   Pio Valera MD   miscellaneous medical supply [facility] [place] 6[place]6 foam with no border dressing, 8x8 transparent dressing; pack wound with calcium alganate silver, cover with foam, secure with transparent dressing; change q3 days or if soiled; 10/3/19   Pio Valera MD   Magnesium Oxide 500 mg cap Take 1 Cap by mouth daily. Provider, Historical   multivitamin (ONE A DAY) tablet take 1 tablet by oral route  every day with food    Provider, Historical   cholecalciferol, vitamin D3, 4,000 unit cap Take 1 Cap by mouth daily. Provider, Historical   omega 3-DHA-EPA-fish oil (FISH OIL) 1,000 mg (120 mg-180 mg) capsule Take 3 Caps by mouth daily. Provider, Historical   glucosamine/chondr mejia A sod (OSTEO BI-FLEX PO) Take 1 Tab by mouth daily. Provider, Historical   aspirin (ASPIRIN) 325 mg tablet Take 325 mg by mouth daily. Provider, Historical     No Known Allergies     Review of Systems:  A comprehensive review of systems was negative except for that written in the History of Present Illness.      Lab Results   Component Value Date/Time    Hemoglobin A1c 7.2 (H) 02/12/2020 07:58 AM        Immunization History   Administered Date(s) Administered    Influenza High Dose Vaccine PF 01/19/2018    Influenza Vaccine 09/01/2012, 12/02/2013, 09/30/2015    Influenza Vaccine (Quad) PF 12/01/2016    Influenza Vaccine (Tri) Adjuvanted 10/03/2019  Pneumococcal Conjugate (PCV-13) 06/02/2016    Pneumococcal Polysaccharide (PPSV-23) 08/02/2018    Pneumococcal Vaccine (Unspecified Type) 10/01/2009    Td 01/01/2007    Tdap 01/19/2018    Zoster Recombinant 02/19/2020    Zoster Vaccine, Live 05/12/2015       Body mass index is 42.88 kg/m². Counseling regarding nutrition done: No     Current medications:  Current Outpatient Medications   Medication Sig Dispense Refill    niacin (NIASPAN) 1,000 mg Tb24 tab TAKE 1 TABLET BY MOUTH THREE TIMES A DAY      colesevelam (WELCHOL) 3.75 gram pwpk powder packet Take 3.75 g by mouth daily. 180 Packet 1    atorvastatin (LIPITOR) 40 mg tablet take 1 tablet by oral route  every day 90 Tab 1    dapagliflozin (FARXIGA) 10 mg tab tablet take 1 tablet by oral route  every day in the morning 90 Tab 1    ezetimibe (ZETIA) 10 mg tablet take 1 tablet by oral route  every day 90 Tab 1    fenofibric acid (TRILIPIX) 135 mg capsule take 1 capsule by oral route  every day 90 Cap 1    fexofenadine (ALLEGRA) 180 mg tablet take 1 tablet by oral route  every day as needed 90 Tab 1    metFORMIN (GLUCOPHAGE) 500 mg tablet TAKE 2 TABLETS TWICE A  Tab 1    montelukast (SINGULAIR) 10 mg tablet take 1 tablet by oral route  every day in the evening as needed 90 Tab 1    SITagliptin (JANUVIA) 100 mg tablet take 1 tablet by oral route  every day 90 Tab 1    VESICARE 5 mg tablet TAKE 1 TABLET BY MOUTH EVERY DAY 30 Tab 12    silver-calcium alginate 8 X 12 \" bndg Pack in wound q3 days 10 Each 3    miscellaneous medical supply misc 6x6 foam with no border dressing, 8x8 transparent dressing; pack wound with calcium alganate silver, cover with foam, secure with transparent dressing; change q3 days or if soiled; 10 Each 3    Magnesium Oxide 500 mg cap Take 1 Cap by mouth daily.       multivitamin (ONE A DAY) tablet take 1 tablet by oral route  every day with food      cholecalciferol, vitamin D3, 4,000 unit cap Take 1 Cap by mouth daily.  omega 3-DHA-EPA-fish oil (FISH OIL) 1,000 mg (120 mg-180 mg) capsule Take 3 Caps by mouth daily.  glucosamine/chondr mejia A sod (OSTEO BI-FLEX PO) Take 1 Tab by mouth daily.  aspirin (ASPIRIN) 325 mg tablet Take 325 mg by mouth daily. Objective:     Physical Exam:     Visit Vitals  BP (!) 135/92 (BP 1 Location: Left arm, BP Patient Position: At rest)   Pulse 87   Temp 97.3 °F (36.3 °C)   Ht 6' (1.829 m)   Wt 143.4 kg (316 lb 3.2 oz)   BMI 42.88 kg/m²       General: well developed, well nourished, pleasant , NAD. Hygiene good  Psych: cooperative. Pleasant. No anxiety or depression. Normal mood and affect. Neuro: alert and oriented to person/place/situation. Otherwise nonfocal.  Derm: Normal turgor for age, dry skin  HEENT: Normocephalic, atraumatic. EOMI. Conjunctiva clear. No scleral icterus. Neck: Normal range of motion. No masses. Chest: Good air entry bilaterally. Respirations nonlabored  Cardio: Normal heart sounds,no rubs, murmurs or gallops  Abdomen: Soft, nontender, nondistended, normoactive bowel sounds  Lower extremities: color normal; temperature normal. Hair growth is not present. Calves are supple, nontender, approximately equally sized in comparison.  Capillary refill <3 sec            Ulcer Description:   Wound Back Upper #1 (Active)   Dressing Status Clean, dry, and intact 4/27/2020  8:20 AM   Non-staged Wound Description Partial thickness 4/27/2020  8:20 AM   Wound Length (cm) 0.6 cm 4/27/2020  8:20 AM   Wound Width (cm) 1.3 cm 4/27/2020  8:20 AM   Wound Depth (cm) 0.1 cm 4/27/2020  8:20 AM   Wound Surface Area (cm^2) 0.78 cm^2 4/27/2020  8:20 AM   Wound Volume (cm^3) 0.08 cm^3 4/27/2020  8:20 AM   Change in Wound Size % 88.53 4/27/2020  8:20 AM   Condition of Base Granulation;Epithelializing 4/27/2020  8:20 AM   Condition of Edges Open 4/27/2020  8:20 AM   Epithelialization (%) 90 4/20/2020  8:25 AM   Tissue Type Percent Red 100 4/27/2020  8:20 AM   Tissue Type Percent Yellow 10 3/23/2020  8:52 AM   Drainage Amount Small 4/27/2020  8:20 AM   Drainage Color Serosanguinous 4/27/2020  8:20 AM   Wound Odor None 4/27/2020  8:20 AM   Cydney-wound Assessment Blanchable erythema 4/27/2020  8:20 AM   Cleansing and Cleansing Agents  Normal saline 4/27/2020  8:20 AM   Dressing Changed Changed/New 4/27/2020  8:20 AM   Procedure Tolerated Well 4/20/2020  8:25 AM   Number of days: 63       Wound Back Lower #2 (Active)   Dressing Status Old drainage 4/27/2020  8:20 AM   Non-staged Wound Description Full thickness 4/27/2020  8:20 AM   Wound Length (cm) 0.2 cm 4/27/2020  8:20 AM   Wound Width (cm) 0.2 cm 4/27/2020  8:20 AM   Wound Depth (cm) 0.1 cm 4/27/2020  8:20 AM   Wound Surface Area (cm^2) 0.04 cm^2 4/27/2020  8:20 AM   Wound Volume (cm^3) 0 cm^3 4/27/2020  8:20 AM   Change in Wound Size % 97.65 4/27/2020  8:20 AM   Condition of Base Granulation 4/27/2020  8:20 AM   Condition of Edges Open 4/27/2020  8:20 AM   Tissue Type Percent Red 100 4/27/2020  8:20 AM   Drainage Amount Moderate 4/27/2020  8:20 AM   Drainage Color Serosanguinous 4/27/2020  8:20 AM   Wound Odor None 4/27/2020  8:20 AM   Cydney-wound Assessment Edema 4/27/2020  8:20 AM   Cleansing and Cleansing Agents  Normal saline 4/27/2020  8:20 AM   Procedure Tolerated Well 4/20/2020  8:25 AM   Number of days: 7       [REMOVED] Wound Back Left;Upper (Removed)   Number of days: 213         Data Review:   No results found for this or any previous visit (from the past 24 hour(s)). Assessment:     71 y.o. male with upper lower back combined ulcer.     Problem List  Date Reviewed: 3/4/2020          Codes Class Noted    Hx of excision of epidermal inclusion cyst ICD-10-CM: Z98.890, Z87.2  ICD-9-CM: V15.29  8/29/2019    Overview Signed 8/29/2019  8:43 AM by Randal Del Rosario MD     8/23/19, Dr. Rudd Side; 10 cm x6 cm x6 cm epidermal inclusion cyst;             Benign neoplasm of sigmoid colon ICD-10-CM: D12.5  ICD-9-CM: 211.3 8/14/2019    Overview Signed 8/15/2019  8:39 AM by Elvia Harris MD     Colonoscopy 4/2019             Gastro-esophageal reflux disease with esophagitis ICD-10-CM: K21.0  ICD-9-CM: 530.11  8/14/2019        Factor 5 Leiden mutation, heterozygous ([address] ICD-10-CM: B41.19  ICD-9-CM: 289.81  1/19/2018    Overview Addendum 8/15/2019  8:40 AM by MD Dr. Elida Harvey, takes  mg every day, Xarelto 20 mg every day              History of DVT (deep vein thrombosis) ICD-10-CM: X16.226  ICD-9-CM: V12.51  3/24/2016    Overview Addendum 1/19/2018  2:07 PM by MD Dr. Elida Harvey  Overview:   Associated with low homocystine and heterozygote for factor V Leiden. Last Assessment & Plan:   No folic acid because of his prostate cancer. No anticoagulation. Overview:   Associated with low homocystine and heterozygote for factor V Leiden. Last Assessment & Plan:   No folic acid because of his prostate cancer. No anticoagulation. Overview:   Associated with low homocystine and heterozygote for factor V Leiden. Last Assessment & Plan:   No folic acid because of his prostate cancer. No anticoagulation. Dx 2002. Allergic rhinitis ICD-10-CM: J30.9  ICD-9-CM: 477.9  12/2/2013        History of prostate cancer ICD-10-CM: Z85.46  ICD-9-CM: V10.46  12/2/2013    Overview Addendum 1/19/2018  1:55 PM by MD Dr. Elida Harvey  Overview:   Seed implant therapy on December 29, 2003 indolent PSA at 0.055 in March 2015 and 0.066 in March 2016    Last Assessment & Plan:   We did not draw blood work this year. The PSA will be done along with the panel of lab work he has with Dr. Adrienne Vincent. There are no signs or symptoms on today's history and physical exam to suggest progression of the prostate cancer. I believe that remains reasonable to monitor him once a year and check the PSA once a year. I will see him back in a year and a further blood work to Dr. Adrienne Vincent.   Overview: Seed implant therapy on December 29, 2003 indolent PSA at 0.055 in March 2015 and 0.066 in March 2016    Last Assessment & Plan:   We did not draw blood work this year. The PSA will be done along with the panel of lab work he has with Dr. Saroj Drew. There are no signs or symptoms on today's history and physical exam to suggest progression of the prostate cancer. I believe that remains reasonable to monitor him once a year and check the PSA once a year. I will see him back in a year and a further blood work to Dr. Saroj Drew. Dr. Emil Orozco. History of sarcoma of soft tissue ICD-10-CM: Z85.831  ICD-9-CM: V10.89  12/2/2013    Overview Addendum 1/19/2018  1:56 PM by MD Dr. Emil Wylie  Overview:   Soft tissue sarcoma, presenting in January 2002, over the right posterior pelvis, status post neoadjuvant chemotherapy followed by surgery at [facility] and adjuvant chemotherapy with Adriamycin plus adjuvant radiation therapy. Continuing complete remission through March 2015 with annual follow-up. Last Assessment & Plan:   Physical exam remains negative. We'll check him once yearly we see him for prostate cancer. Overview:   Soft tissue sarcoma, presenting in January 2002, over the right posterior pelvis, status post neoadjuvant chemotherapy followed by surgery at [facility] and adjuvant chemotherapy with Adriamycin plus adjuvant radiation therapy. Continuing complete remission through March 2015 with annual follow-up. Last Assessment & Plan:   Physical exam remains negative. We'll check him once yearly we see him for prostate cancer. Dr. Emil Orozco.              Diabetes mellitus type 2, controlled ([facility] ICD-10-CM: E11.9  ICD-9-CM: 250.00  9/10/2013        Obesity, morbid ([facility] [address] ICD-10-CM: E66.01  ICD-9-CM: 278.01  Unknown        Degenerative disc disease, cervical ICD-10-CM: M50.30  ICD-9-CM: 722.4  Unknown        Hyperlipidemia ICD-10-CM: E78.5  ICD-9-CM: 272.4  Unknown Hypertension ICD-10-CM: I10  ICD-9-CM: 401.9  Unknown        CKD (chronic kidney disease) ICD-10-CM: N18.9  ICD-9-CM: 968. 9  Unknown               Plan:     No orders of the defined types were placed in this encounter. Patient understood and agrees with plan. Questions answered. Follow-up Information    None            Any procedures done today in the [address] [facility],[address] are documented in a separate note in [facility] and made part of this record by reference.      Dictated using voice recognition software; proofread, but unrecognized errors may exist.    Signed By: Terri Huang MD     April 27, 2020

## 2020-04-27 NOTE — PROGRESS NOTES
04/27/20 0820   Wound Back Lower #2   Date First Assessed/Time First Assessed: 04/20/20 0827   Present on Hospital Admission: Yes  Primary Wound Type: Abscess  Location: Back  Wound Location Orientation: Lower  Wound Description: #2   Dressing Status Old drainage   Dressing Type   (coversite and gauze)   Non-staged Wound Description Full thickness   Wound Length (cm) 0.2 cm   Wound Width (cm) 0.2 cm   Wound Depth (cm) 0.1 cm   Wound Surface Area (cm^2) 0.04 cm^2   Wound Volume (cm^3) 0 cm^3   Change in Wound Size % 97.65   Condition of Base Granulation   Condition of Edges Open   Tissue Type Percent Red 100   Drainage Amount Moderate   Drainage Color Serosanguinous   Wound Odor None   Cydney-wound Assessment Edema   Cleansing and Cleansing Agents  Normal saline   Wound Back Upper #1   Date First Assessed/Time First Assessed: 02/24/20 0928   Primary Wound Type: Open incision/surgical site  Location: Back  Wound Location Orientation: Upper  Wound Description: #1   Dressing Status Clean, dry, and intact   Dressing Type   (anuja and coversite)   Non-staged Wound Description Partial thickness   Wound Length (cm) 0.6 cm   Wound Width (cm) 1.3 cm   Wound Depth (cm) 0.1 cm   Wound Surface Area (cm^2) 0.78 cm^2   Wound Volume (cm^3) 0.08 cm^3   Change in Wound Size % 88.53   Condition of Base Granulation;Epithelializing   Condition of Edges Open   Tissue Type Percent Red 100   Drainage Amount Small   Drainage Color Serosanguinous   Wound Odor None   Cydney-wound Assessment Blanchable erythema   Cleansing and Cleansing Agents  Normal saline   Dressing Changed Changed/New           Patient is currently taking xarelto daily

## 2020-04-27 NOTE — DISCHARGE INSTRUCTIONS
[address]  [address], [address]   Phone: [phone]  Fax: [phone]    Patient: Madyson Liriano MRN: [id number]  SSN: [id number]    YOB: 1950  Age: 71 y.o. Sex: male       Return Appointment: 2 weeks with Jeffery Monroe MD    Instructions: Upper and lower back wounds  Cleanse wound and periwound with wound cleanser or normal saline. Apply Desitin mixed with Nystatin powder to wound periphery. Katty (may substitute equivalent collagen): cut to approximate wound size, apply to wound bed. Cover with gauze and secure with Covrsite. Change every other day. Apply warm compresses to lower back wound daily with dressing in place. Return to wound center in 2 weeks    Should you experience increased redness, swelling, pain, foul odor, size of wound(s), or have a temperature over 101 degrees please contact the [address] [place] [address] Road at [phone] or if after hours contact your primary care physician or go to the hospital emergency department.     Signed By: Gertrude Bahena RN     April 27, 2020

## 2020-04-27 NOTE — WOUND CARE
[address] [place] [address] [place], [address] Phone: [phone] Fax: [phone] Patient: Robin Nazario MRN: [id number]  SSN: [id number] YOB: 1950  Age: 71 y.o. Sex: male Return Appointment: 2 weeks with Godwin Barnes MD 
 
Instructions: Upper and lower back wounds Cleanse wound and periwound with wound cleanser or normal saline. Apply Desitin mixed with Nystatin powder to wound periphery. Katty (may substitute equivalent collagen): cut to approximate wound size, apply to wound bed. Cover with gauze and secure with Covrsite. Change every other day. Apply warm compresses to lower back wound daily with dressing in place. Return to wound center in 2 weeks Should you experience increased redness, swelling, pain, foul odor, size of wound(s), or have a temperature over 101 degrees please contact the [address] [place] [address] Road at [phone] or if after hours contact your primary care physician or go to the hospital emergency department. Signed By: Gilbert Thompson RN April 27, 2020

## 2020-05-11 ENCOUNTER — HOSPITAL ENCOUNTER (OUTPATIENT)
Dept: WOUND CARE | Age: 70
Discharge: HOME | End: 2020-05-11
Attending: SURGERY
Payer: MEDICARE

## 2020-05-11 VITALS — WEIGHT: 312 LBS | TEMPERATURE: 97.7 F | BODY MASS INDEX: 42.26 KG/M2 | HEIGHT: 72 IN

## 2020-05-11 PROCEDURE — 99212 OFFICE O/P EST SF 10 MIN: CPT

## 2020-05-11 RX ORDER — SILVER NITRATE 38.21; 12.74 MG/1; MG/1
1 STICK TOPICAL ONCE
Status: COMPLETED | OUTPATIENT
Start: 2020-05-11 | End: 2020-05-11

## 2020-05-11 RX ADMIN — SILVER NITRATE 1 APPLICATOR: 38.21; 12.74 STICK TOPICAL at 09:26

## 2020-05-11 NOTE — WOUND CARE
Referral faxed to [facility] for Dr. Germain Pearson to remove sebaceous cyst with confirmation rec'd 5/11/20. (3) adequate

## 2020-05-11 NOTE — WOUND CARE
05/11/20 0820 Wound Back Upper #1 Date First Assessed/Time First Assessed: 02/24/20 0928   Primary Wound Type: Open incision/surgical site  Location: Back  Wound Location Orientation: Upper  Wound Description: #1 Dressing Status Clean, dry, and intact Non-staged Wound Description Partial thickness Wound Length (cm) 0.6 cm Wound Width (cm) 1 cm Wound Depth (cm) 0.1 cm Wound Surface Area (cm^2) 0.6 cm^2 Wound Volume (cm^3) 0.06 cm^3 Change in Wound Size % 91.18 Condition of Base Epithelializing Tissue Type Percent Pink 100 Drainage Amount Scant Drainage Color Serous Wound Odor None Cydney-wound Assessment Blanchable erythema Dressing Changed Changed/New Wound Back Lower #2 Date First Assessed/Time First Assessed: 04/20/20 0827   Present on Hospital Admission: Yes  Primary Wound Type: Abscess  Location: Back  Wound Location Orientation: Lower  Wound Description: #2 Dressing Type  
(anuja covrsite) Non-staged Wound Description Full thickness Wound Length (cm) 0.4 cm Wound Width (cm) 0.3 cm Wound Depth (cm) 0.3 cm Wound Surface Area (cm^2) 0.12 cm^2 Wound Volume (cm^3) 0.04 cm^3 Change in Wound Size % 92.94 Condition of Base Granulation Tissue Type Percent Pink 100 Drainage Amount Moderate Drainage Color Serous Cydney-wound Assessment Hyperpigmented Cleansing and Cleansing Agents  Normal saline Dressing Changed Changed/New Patient is on an anti coagulant daily asa81

## 2020-05-11 NOTE — DISCHARGE INSTRUCTIONS
[address]  [address], [address]  Phone: [phone]  Fax: [phone]    Patient: Camila Weiner MRN: [id number]  SSN: [id number]    YOB: 1950  Age: 71 y.o. Sex: male       Return Appointment: 2 weeks with Javon Buenrostro MD    Instructions Upper and Lower back  Cleanse wound and periwound with wound cleanser or normal saline. Iodosorb- apply thin layer to wound bed, cover with cover dressing, change daily   Secure with gauze and Covrsite. Change daily.     Refer to Dr [facility] for removal of sebaceous cyst on lower back. Follow up in wound center in 2 weeks. Should you experience increased redness, swelling, pain, foul odor, size of wound(s), or have a temperature over 101 degrees please contact the [address] [place] [address] Road at [phone] or if after hours contact your primary care physician or go to the hospital emergency department.     Signed By: Queen Alma     May 11, 2020

## 2020-05-11 NOTE — PROGRESS NOTES
The wound of the upper back is almost closed but the new wound on the lower back is an infected sebaceous cyst we will send him back to Dr. Zeb Guaraddo for attention to this either I&D or excision. He will return to see us after this has been done. Wound Center Progress Note    Patient: Jaylen Briseno MRN: [id number]  SSN: [id number]    YOB: 1950  Age: 71 y.o. Sex: male      Subjective:     Chief Complaint:  Jaylen Briseno is a 71 y.o. WHITE OR  male who presents with upper and lower back wound of 3 months duration.     History of Present Illness:     See above note  Wound Caused By: previous infection  Associated Signs and Symptoms: Pain and drainage  Timing: Intermittent  Quality: Aching  Severity: 3/10  Modifying Factors: Oily skin  Current Wound Care: See nurses notes    Past Medical History:   Diagnosis Date    Allergic sinusitis     Cholelithiasis     CKD (chronic kidney disease)     pt denies; CMP WNL (1/2019, 8/2019)    Degenerative disc disease, cervical     Diabetes mellitus type II     oral meds, does not check BG regularly, last hgba1c- 7.0 (8/2/19)    Elevated PSA     GERD (gastroesophageal reflux disease)     managed with medication    History of prostate cancer 2002    Hx of malignant neoplasm of soft tissue 2002    Hx of syncope     Hyperlipidemia     Hypertension     Migraine     \"haven't had in years\"    Multiple nevi     Obesity, morbid ([address]     bmi- 42    Personal history of colonic polyps 2015    adenomas    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    Thromboembolus ([facility] 2002; 10/2018    right leg; family hx of factor 5 leiden- pt on xarelto and ASA    Ulnar nerve compression       Past Surgical History:   Procedure Laterality Date    HX COLONOSCOPY  3/24/15; most recent 3/2019    Tyler--two asc TA, three desc hyperplastic, one sigmoid TA--3 year recall    HX LAP CHOLECYSTECTOMY  1980's    HX MALIGNANT SKIN LESION EXCISION     sarcoma    HX ORTHOPAEDIC  1985    Relocate nerve in right elbow    HX PROSTATECTOMY      not removed, radiation seeds     Family History   Problem Relation Age of Onset    Cancer Father         lung    Heart Attack Mother     Heart Disease Mother     Cancer Paternal Grandmother     Cancer Paternal Grandfather     Lung Disease Paternal Grandfather       Social History     Tobacco Use    Smoking status: Former Smoker     Packs/day: 1.00     Years: 10.00     Pack years: 10.00     Last attempt to quit: 1984     Years since quittin.0    Smokeless tobacco: Never Used   Substance Use Topics    Alcohol use: Yes     Comment: rarely       Prior to Admission medications    Medication Sig Start Date End Date Taking? Authorizing Provider   niacin (NIASPAN) 1,000 mg Tb24 tab TAKE 1 TABLET BY MOUTH THREE TIMES A DAY 20   Provider, Alycia   [facility]) 3.75 gram pwpk powder packet Take 3.75 g by mouth daily.  3/4/20   Lluvia Al MD   atorvastatin (LIPITOR) 40 mg tablet take 1 tablet by oral route  every day 20   Lluvia Al MD   dapagliflozin (FARXIGA) 10 mg tab tablet take 1 tablet by oral route  every day in the morning 20   Lluvia Al MD   ezetimibe (ZETIA) 10 mg tablet take 1 tablet by oral route  every day 20   Lluvia Al MD   fenofibric acid (TRILIPIX) 135 mg capsule take 1 capsule by oral route  every day 20   Lluvia Al MD   fexofenadine (ALLEGRA) 180 mg tablet take 1 tablet by oral route  every day as needed 20   Lluvia Al MD   metFORMIN (GLUCOPHAGE) 500 mg tablet TAKE 2 TABLETS TWICE A DAY 20   Lluvia Al MD   montelukast (SINGULAIR) 10 mg tablet take 1 tablet by oral route  every day in the evening as needed 20   Lluvia Al MD   SITagliptin (JANUVIA) 100 mg tablet take 1 tablet by oral route  every day 20   Lluvia Al, MD   VESICARE 5 mg tablet TAKE 1 TABLET BY MOUTH EVERY DAY 1/27/20   Pio Valera MD   silver-calcium alginate 8 X 12 \" bndg Pack in wound q3 days 10/3/19   Pio Valera MD   miscellaneous medical supply [facility] 6x6 foam with no border dressing, 8x8 transparent dressing; pack wound with calcium alganate silver, cover with foam, secure with transparent dressing; change q3 days or if soiled; 10/3/19   Pio Valera MD   Magnesium Oxide 500 mg cap Take 1 Cap by mouth daily. Provider, Historical   multivitamin (ONE A DAY) tablet take 1 tablet by oral route  every day with food    Provider, Historical   cholecalciferol, vitamin D3, 4,000 unit cap Take 1 Cap by mouth daily. Provider, Historical   omega 3-DHA-EPA-fish oil (FISH OIL) 1,000 mg (120 mg-180 mg) capsule Take 3 Caps by mouth daily. Provider, Historical   glucosamine/chondr mejia A sod (OSTEO BI-FLEX PO) Take 1 Tab by mouth daily. Provider, Historical   aspirin (ASPIRIN) 325 mg tablet Take 325 mg by mouth daily. Provider, Historical     No Known Allergies     Review of Systems:  A comprehensive review of systems was negative except for that written in the History of Present Illness. Lab Results   Component Value Date/Time    Hemoglobin A1c 7.2 (H) 02/12/2020 07:58 AM        Immunization History   Administered Date(s) Administered    Influenza High Dose Vaccine PF 01/19/2018    Influenza Vaccine 09/01/2012, 12/02/2013, 09/30/2015    Influenza Vaccine (Quad) PF 12/01/2016    Influenza Vaccine (Tri) Adjuvanted 10/03/2019    Pneumococcal Conjugate (PCV-13) 06/02/2016    Pneumococcal Polysaccharide (PPSV-23) 08/02/2018    Pneumococcal Vaccine (Unspecified Type) 10/01/2009    Td 01/01/2007    Tdap 01/19/2018    Zoster Recombinant 02/19/2020    Zoster Vaccine, Live 05/12/2015       Body mass index is 42.31 kg/m².     Counseling regarding nutrition done: No     Current medications:  Current Outpatient Medications   Medication Sig Dispense Refill    niacin (NIASPAN) 1,000 mg Tb24 tab TAKE 1 TABLET BY MOUTH THREE TIMES A DAY      colesevelam (WELCHOL) 3.75 gram pwpk powder packet Take 3.75 g by mouth daily. 180 Packet 1    atorvastatin (LIPITOR) 40 mg tablet take 1 tablet by oral route  every day 90 Tab 1    dapagliflozin (FARXIGA) 10 mg tab tablet take 1 tablet by oral route  every day in the morning 90 Tab 1    ezetimibe (ZETIA) 10 mg tablet take 1 tablet by oral route  every day 90 Tab 1    fenofibric acid (TRILIPIX) 135 mg capsule take 1 capsule by oral route  every day 90 Cap 1    fexofenadine (ALLEGRA) 180 mg tablet take 1 tablet by oral route  every day as needed 90 Tab 1    metFORMIN (GLUCOPHAGE) 500 mg tablet TAKE 2 TABLETS TWICE A  Tab 1    montelukast (SINGULAIR) 10 mg tablet take 1 tablet by oral route  every day in the evening as needed 90 Tab 1    SITagliptin (JANUVIA) 100 mg tablet take 1 tablet by oral route  every day 90 Tab 1    VESICARE 5 mg tablet TAKE 1 TABLET BY MOUTH EVERY DAY 30 Tab 12    silver-calcium alginate 8 X 12 \" bndg Pack in wound q3 days 10 Each 3    miscellaneous medical supply misc 6x6 foam with no border dressing, 8x8 transparent dressing; pack wound with calcium alganate silver, cover with foam, secure with transparent dressing; change q3 days or if soiled; 10 Each 3    Magnesium Oxide 500 mg cap Take 1 Cap by mouth daily.  multivitamin (ONE A DAY) tablet take 1 tablet by oral route  every day with food      cholecalciferol, vitamin D3, 4,000 unit cap Take 1 Cap by mouth daily.  omega 3-DHA-EPA-fish oil (FISH OIL) 1,000 mg (120 mg-180 mg) capsule Take 3 Caps by mouth daily.  glucosamine/chondr mejia A sod (OSTEO BI-FLEX PO) Take 1 Tab by mouth daily.  aspirin (ASPIRIN) 325 mg tablet Take 325 mg by mouth daily.        Current Facility-Administered Medications   Medication Dose Route Frequency Provider Last Rate Last Dose    silver nitrate applicators (ARZOL) 1 Applicator  1 Applicator Topical ONCE Pato Cruz MD Objective:     Physical Exam:     Visit Vitals  Temp 97.7 °F (36.5 °C)   Ht 6' (1.829 m)   Wt 141.5 kg (312 lb)   BMI 42.31 kg/m²       General: well developed, well nourished, pleasant , NAD. Hygiene good  Psych: cooperative. Pleasant. No anxiety or depression. Normal mood and affect. Neuro: alert and oriented to person/place/situation. Otherwise nonfocal.  Derm: Normal turgor for age, dry skin  HEENT: Normocephalic, atraumatic. EOMI. Conjunctiva clear. No scleral icterus. Neck: Normal range of motion. No masses. Chest: Good air entry bilaterally. Respirations nonlabored  Cardio: Normal heart sounds,no rubs, murmurs or gallops  Abdomen: Soft, nontender, nondistended, normoactive bowel sounds  Lower extremities: color normal; temperature normal. Hair growth is not present. Calves are supple, nontender, approximately equally sized in comparison.  Capillary refill <3 sec            Ulcer Description:   Wound Back Upper #1 (Active)   Dressing Status Clean, dry, and intact 5/11/2020  8:20 AM   Non-staged Wound Description Partial thickness 5/11/2020  8:20 AM   Wound Length (cm) 0.6 cm 5/11/2020  8:20 AM   Wound Width (cm) 1 cm 5/11/2020  8:20 AM   Wound Depth (cm) 0.1 cm 5/11/2020  8:20 AM   Wound Surface Area (cm^2) 0.6 cm^2 5/11/2020  8:20 AM   Wound Volume (cm^3) 0.06 cm^3 5/11/2020  8:20 AM   Change in Wound Size % 91.18 5/11/2020  8:20 AM   Condition of Base Epithelializing 5/11/2020  8:20 AM   Condition of Edges Open 4/27/2020  8:20 AM   Epithelialization (%) 90 4/20/2020  8:25 AM   Tissue Type Percent Pink 100 5/11/2020  8:20 AM   Tissue Type Percent Red 100 4/27/2020  8:20 AM   Tissue Type Percent Yellow 10 3/23/2020  8:52 AM   Drainage Amount Scant 5/11/2020  8:20 AM   Drainage Color Serous 5/11/2020  8:20 AM   Wound Odor None 5/11/2020  8:20 AM   Cydney-wound Assessment Blanchable erythema 5/11/2020  8:20 AM   Cleansing and Cleansing Agents  Normal saline 4/27/2020  8:20 AM   Dressing Changed Changed/New 5/11/2020  8:20 AM   Procedure Tolerated Well 4/20/2020  8:25 AM   Number of days: 77       Wound Back Lower #2 (Active)   Dressing Status Old drainage 4/27/2020  8:20 AM   Non-staged Wound Description Full thickness 5/11/2020  8:20 AM   Wound Length (cm) 0.4 cm 5/11/2020  8:20 AM   Wound Width (cm) 0.3 cm 5/11/2020  8:20 AM   Wound Depth (cm) 0.3 cm 5/11/2020  8:20 AM   Wound Surface Area (cm^2) 0.12 cm^2 5/11/2020  8:20 AM   Wound Volume (cm^3) 0.04 cm^3 5/11/2020  8:20 AM   Change in Wound Size % 92.94 5/11/2020  8:20 AM   Condition of Base Granulation 5/11/2020  8:20 AM   Condition of Edges Open 4/27/2020  8:20 AM   Tissue Type Percent Pink 100 5/11/2020  8:20 AM   Tissue Type Percent Red 100 4/27/2020  8:20 AM   Drainage Amount Moderate 5/11/2020  8:20 AM   Drainage Color Serous 5/11/2020  8:20 AM   Wound Odor None 4/27/2020  8:20 AM   Cydney-wound Assessment Hyperpigmented 5/11/2020  8:20 AM   Cleansing and Cleansing Agents  Normal saline 5/11/2020  8:20 AM   Dressing Changed Changed/New 5/11/2020  8:20 AM   Procedure Tolerated Well 4/20/2020  8:25 AM   Number of days: 21       [REMOVED] Wound Back Left;Upper (Removed)   Number of days: 213         Data Review:   No results found for this or any previous visit (from the past 24 hour(s)). Assessment:     71 y.o. male with Upper and lower back infected sebaceous cyst ulcer.     Problem List  Date Reviewed: 3/4/2020          Codes Class Noted    Hx of excision of epidermal inclusion cyst ICD-10-CM: Z98.890, Z87.2  ICD-9-CM: V15.29  8/29/2019    Overview Signed 8/29/2019  8:43 AM by Inder Pretty MD     8/23/19,  [facility]; 10 cm x6 cm x6 cm epidermal inclusion cyst;             Benign neoplasm of sigmoid colon ICD-10-CM: D12.5  ICD-9-CM: 211.3  8/14/2019    Overview Signed 8/15/2019  8:39 AM by Idner Pretty MD     Colonoscopy 4/2019             Gastro-esophageal reflux disease with esophagitis ICD-10-CM: K21.0  ICD-9-CM: 530.11  8/14/2019 Factor 5 Leiden mutation, heterozygous [facility]) ICD-10-CM: D58.67  ICD-9-CM: 289.81  1/19/2018    Overview Addendum 8/15/2019  8:40 AM by MD Dr. Michelle Castrejon, takes  mg every day, Xarelto 20 mg every day              History of DVT (deep vein thrombosis) ICD-10-CM: I69.190  ICD-9-CM: V12.51  3/24/2016    Overview Addendum 1/19/2018  2:07 PM by MD Dr. Michelle Castrejon  Overview:   Associated with low homocystine and heterozygote for factor V Leiden. Last Assessment & Plan:   No folic acid because of his prostate cancer. No anticoagulation. Overview:   Associated with low homocystine and heterozygote for factor V Leiden. Last Assessment & Plan:   No folic acid because of his prostate cancer. No anticoagulation. Overview:   Associated with low homocystine and heterozygote for factor V Leiden. Last Assessment & Plan:   No folic acid because of his prostate cancer. No anticoagulation. Dx 2002. Allergic rhinitis ICD-10-CM: J30.9  ICD-9-CM: 477.9  12/2/2013        History of prostate cancer ICD-10-CM: Z85.46  ICD-9-CM: V10.46  12/2/2013    Overview Addendum 1/19/2018  1:55 PM by MD Dr. Michelle Castrejon  Overview:   Seed implant therapy on December 29, 2003 indolent PSA at 0.055 in March 2015 and 0.066 in March 2016    Last Assessment & Plan:   We did not draw blood work this year. The PSA will be done along with the panel of lab work he has with Dr. Wen Monzon. There are no signs or symptoms on today's history and physical exam to suggest progression of the prostate cancer. I believe that remains reasonable to monitor him once a year and check the PSA once a year. I will see him back in a year and a further blood work to Dr. Wen Monzon. Overview:   Seed implant therapy on December 29, 2003 indolent PSA at 0.055 in March 2015 and 0.066 in March 2016    Last Assessment & Plan:   We did not draw blood work this year.   The PSA will be done along with the panel of lab work he has with Dr. Carmen Santana. There are no signs or symptoms on today's history and physical exam to suggest progression of the prostate cancer. I believe that remains reasonable to monitor him once a year and check the PSA once a year. I will see him back in a year and a further blood work to Dr. Carmen Santana. Dr. Leigh Ann Faye. History of sarcoma of soft tissue ICD-10-CM: Z85.831  ICD-9-CM: V10.89  12/2/2013    Overview Addendum 1/19/2018  1:56 PM by MD Dr. Leigh Ann Zimmer  Overview:   Soft tissue sarcoma, presenting in January 2002, over the right posterior pelvis, status post neoadjuvant chemotherapy followed by surgery at [facility] and adjuvant chemotherapy with Adriamycin plus adjuvant radiation therapy. Continuing complete remission through March 2015 with annual follow-up. Last Assessment & Plan:   Physical exam remains negative. We'll check him once yearly we see him for prostate cancer. Overview:   Soft tissue sarcoma, presenting in January 2002, over the right posterior pelvis, status post neoadjuvant chemotherapy followed by surgery at [facility] and adjuvant chemotherapy with Adriamycin plus adjuvant radiation therapy. Continuing complete remission through March 2015 with annual follow-up. Last Assessment & Plan:   Physical exam remains negative. We'll check him once yearly we see him for prostate cancer. Dr. Leigh Ann Faye. Diabetes mellitus type 2, controlled ([facility] [address] [address] ICD-10-CM: E11.9  ICD-9-CM: 250.00  9/10/2013        Obesity, morbid (HCC) ICD-10-CM: E66.01  ICD-9-CM: 278.01  Unknown        Degenerative disc disease, cervical ICD-10-CM: M50.30  ICD-9-CM: 722.4  Unknown        Hyperlipidemia ICD-10-CM: E78.5  ICD-9-CM: 272.4  Unknown        Hypertension ICD-10-CM: I10  ICD-9-CM: 401.9  Unknown        CKD (chronic kidney disease) ICD-10-CM: N18.9  ICD-9-CM: 574. 9  Unknown               Plan:     Orders Placed This Encounter    WOUND CARE, DRESSING CHANGE     Upper and Lower back  Cleanse wound and periwound with wound cleanser or normal saline. Iodosorb- apply thin layer to wound bed, cover with cover dressing, change daily   Secure with gauze and Covrsite. Change daily. [facility] for removal of sebaceous cyst on lower back. Follow up in wound center in 2 weeks. Standing Status:   Standing     Number of Occurrences:   1    silver nitrate applicators (ARZOL) 1 Applicator        Patient understood and agrees with plan. Questions answered. Follow-up Information     Follow up With Specialties Details Why Contact Info    [address] In 2 weeks  [facility] [address] [facility]  [phone]             Any procedures done today in the [address] [facility] are documented in a separate note in [facility] and made part of this record by reference.      Dictated using voice recognition software; proofread, but unrecognized errors may exist.    Signed By: Radha Valenzuela MD     May 11, 2020

## 2020-05-26 ENCOUNTER — HOSPITAL ENCOUNTER (OUTPATIENT)
Dept: WOUND CARE | Age: 70
Discharge: HOME | End: 2020-05-26
Attending: SURGERY
Payer: MEDICARE

## 2020-05-26 VITALS
HEIGHT: 72 IN | WEIGHT: 313 LBS | OXYGEN SATURATION: 96 % | TEMPERATURE: 97.5 F | RESPIRATION RATE: 18 BRPM | HEART RATE: 112 BPM | SYSTOLIC BLOOD PRESSURE: 162 MMHG | DIASTOLIC BLOOD PRESSURE: 92 MMHG | BODY MASS INDEX: 42.39 KG/M2

## 2020-05-26 PROCEDURE — 87077 CULTURE AEROBIC IDENTIFY: CPT

## 2020-05-26 PROCEDURE — 99212 OFFICE O/P EST SF 10 MIN: CPT

## 2020-05-26 PROCEDURE — 87186 SC STD MICRODIL/AGAR DIL: CPT

## 2020-05-26 PROCEDURE — 87205 SMEAR GRAM STAIN: CPT

## 2020-05-26 NOTE — DISCHARGE INSTRUCTIONS
[address]  [address], [address] [facility] [place]   Phone: [phone]  Fax: [phone]    Patient: Wlil Caraballo MRN: [id number]  SSN: [id number]    YOB: 1950  Age: 71 y.o. Sex: male       Return Appointment: 3 weeks with Nereida Cristobal MD    Instructions: Lower back  Cleanse wound and periwound with wound cleanser or normal saline. Iodosorb- apply thin layer to wound bed, cover with cover dressing, change daily   Secure with gauze and Covrsite. Change daily. Wound Culture obtained today  Follow up in 3 weeks after scheduled procedure on June 3rd 2020      Should you experience increased redness, swelling, pain, foul odor, size of wound(s), or have a temperature over 101 degrees please contact the [address] [place] [address] Road at [phone] or if after hours contact your primary care physician or go to the hospital emergency department.     Signed By: Asya George RN     May 26, 2020

## 2020-05-26 NOTE — PROGRESS NOTES
The upper wound is healed the lower wound is still draining and is quite tender it was cultured today and we will start him on antibiotics when the culture comes back if it does the last time we cultured it it was negative. Continue applying Iodosorb with gauze and cover site               Wound Center Progress Note    Patient: Darcie Corona MRN: [id number]  SSN: [id number]    YOB: 1950  Age: 71 y.o. Sex: male      Subjective:     Chief Complaint:  Darcie Corona is a 71 y.o. WHITE OR  male who presents with upper and lower back wound of 3 months duration. History of Present Illness:      For surgery next Wednesday to excise this infected sebaceous cyst  Wound Caused By: previous infection  Associated Signs and Symptoms: Pain and drainage  Timing: Intermittent  Quality: Aching  Severity: 4/10  Modifying Factors: Obesity and oily skin condition  Current Wound Care see nurses notes    Past Medical History:   Diagnosis Date    Allergic sinusitis     Cholelithiasis     CKD (chronic kidney disease)     pt denies; CMP WNL (1/2019, 8/2019)    Degenerative disc disease, cervical     Diabetes mellitus type II     oral meds, does not check BG regularly, last hgba1c- 7.0 (8/2/19)    Elevated PSA     GERD (gastroesophageal reflux disease)     managed with medication    History of prostate cancer 2002    Hx of malignant neoplasm of soft tissue 2002    Hx of syncope     Hyperlipidemia     Hypertension     Migraine     \"haven't had in years\"    Multiple nevi     Obesity, morbid ([address]     bmi- 42    Personal history of colonic polyps 2015    adenomas    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    with prostate cancer    Thromboembolus ([address] 2002; 10/2018    right leg; family hx of factor 5 leiden- pt on xarelto and ASA    Ulnar nerve compression       Past Surgical History:   Procedure Laterality Date    HX COLONOSCOPY  3/24/15; most recent 3/2019    [facility]--two asc TA, three desc hyperplastic, one sigmoid TA--3 year recall    HX LAP CHOLECYSTECTOMY      HX MALIGNANT SKIN LESION EXCISION      sarcoma    HX ORTHOPAEDIC  1985    Relocate nerve in right elbow    HX PROSTATECTOMY      not removed, radiation seeds     Family History   Problem Relation Age of Onset    Cancer Father         lung    Heart Attack Mother     Heart Disease Mother     Cancer Paternal Grandmother     Cancer Paternal Grandfather     Lung Disease Paternal Grandfather       Social History     Tobacco Use    Smoking status: Former Smoker     Packs/day: 1.00     Years: 10.00     Pack years: 10.00     Last attempt to quit: 1984     Years since quittin.0    Smokeless tobacco: Never Used   Substance Use Topics    Alcohol use: Yes     Comment: rarely       Prior to Admission medications    Medication Sig Start Date End Date Taking? Authorizing Provider   niacin (NIASPAN) 1,000 mg Tb24 tab TAKE 1 TABLET BY MOUTH THREE TIMES A DAY 20   Provider, Alycia   [facility]) 3.75 gram pwpk powder packet Take 3.75 g by mouth daily.  3/4/20   Mikey More, MD   atorvastatin (LIPITOR) 40 mg tablet take 1 tablet by oral route  every day 20   Mikey More, MD   dapagliflozin (FARXIGA) 10 mg tab tablet take 1 tablet by oral route  every day in the morning 20   Mikey More, MD   ezetimibe (ZETIA) 10 mg tablet take 1 tablet by oral route  every day 20   Mikey More, MD   fenofibric acid (TRILIPIX) 135 mg capsule take 1 capsule by oral route  every day 20   Mikey More, MD   fexofenadine (ALLEGRA) 180 mg tablet take 1 tablet by oral route  every day as needed 20   Mikey More, MD   metFORMIN (GLUCOPHAGE) 500 mg tablet TAKE 2 TABLETS TWICE A DAY 20   Mikey More, MD   montelukast (SINGULAIR) 10 mg tablet take 1 tablet by oral route  every day in the evening as needed 20   Mikey More, MD   SITagliptin (JANUVIA) 100 mg tablet take 1 tablet by oral route  every day 1/27/20   Ivett Spring MD   VESICARE 5 mg tablet TAKE 1 TABLET BY MOUTH EVERY DAY 1/27/20   Ivett Spring MD   silver-calcium alginate 8 X 12 \" bndg Pack in wound q3 days 10/3/19   Ivett Spring MD   miscellaneous medical supply [facility] 6x6 foam with no border dressing, 8x8 transparent dressing; pack wound with calcium alganate silver, cover with foam, secure with transparent dressing; change q3 days or if soiled; 10/3/19   Ivett Spring MD   Magnesium Oxide 500 mg cap Take 1 Cap by mouth daily. Provider, Historical   multivitamin (ONE A DAY) tablet take 1 tablet by oral route  every day with food    Provider, Historical   cholecalciferol, vitamin D3, 4,000 unit cap Take 1 Cap by mouth daily. Provider, Historical   omega 3-DHA-EPA-fish oil (FISH OIL) 1,000 mg (120 mg-180 mg) capsule Take 3 Caps by mouth daily. Provider, Historical   glucosamine/chondr mejia A sod (OSTEO BI-FLEX PO) Take 1 Tab by mouth daily. Provider, Historical   aspirin (ASPIRIN) 325 mg tablet Take 325 mg by mouth daily. Provider, Historical     No Known Allergies     Review of Systems:  A comprehensive review of systems was negative except for that written in the History of Present Illness. Lab Results   Component Value Date/Time    Hemoglobin A1c 7.2 (H) 02/12/2020 07:58 AM        Immunization History   Administered Date(s) Administered    Influenza High Dose Vaccine PF 01/19/2018    Influenza Vaccine 09/01/2012, 12/02/2013, 09/30/2015    Influenza Vaccine (Quad) PF 12/01/2016    Influenza Vaccine (Tri) Adjuvanted 10/03/2019    Pneumococcal Conjugate (PCV-13) 06/02/2016    Pneumococcal Polysaccharide (PPSV-23) 08/02/2018    Pneumococcal Vaccine (Unspecified Type) 10/01/2009    Td 01/01/2007    Tdap 01/19/2018    Zoster Recombinant 02/19/2020    Zoster Vaccine, Live 05/12/2015       Body mass index is 42.45 kg/m².     Counseling regarding nutrition done: No     Current medications:  Current Outpatient Medications   Medication Sig Dispense Refill    niacin (NIASPAN) 1,000 mg Tb24 tab TAKE 1 TABLET BY MOUTH THREE TIMES A DAY      colesevelam (WELCHOL) 3.75 gram pwpk powder packet Take 3.75 g by mouth daily. 180 Packet 1    atorvastatin (LIPITOR) 40 mg tablet take 1 tablet by oral route  every day 90 Tab 1    dapagliflozin (FARXIGA) 10 mg tab tablet take 1 tablet by oral route  every day in the morning 90 Tab 1    ezetimibe (ZETIA) 10 mg tablet take 1 tablet by oral route  every day 90 Tab 1    fenofibric acid (TRILIPIX) 135 mg capsule take 1 capsule by oral route  every day 90 Cap 1    fexofenadine (ALLEGRA) 180 mg tablet take 1 tablet by oral route  every day as needed 90 Tab 1    metFORMIN (GLUCOPHAGE) 500 mg tablet TAKE 2 TABLETS TWICE A  Tab 1    montelukast (SINGULAIR) 10 mg tablet take 1 tablet by oral route  every day in the evening as needed 90 Tab 1    SITagliptin (JANUVIA) 100 mg tablet take 1 tablet by oral route  every day 90 Tab 1    VESICARE 5 mg tablet TAKE 1 TABLET BY MOUTH EVERY DAY 30 Tab 12    silver-calcium alginate 8 X 12 \" bndg Pack in wound q3 days 10 Each 3    miscellaneous medical supply misc 6x6 foam with no border dressing, 8x8 transparent dressing; pack wound with calcium alganate silver, cover with foam, secure with transparent dressing; change q3 days or if soiled; 10 Each 3    Magnesium Oxide 500 mg cap Take 1 Cap by mouth daily.  multivitamin (ONE A DAY) tablet take 1 tablet by oral route  every day with food      cholecalciferol, vitamin D3, 4,000 unit cap Take 1 Cap by mouth daily.  omega 3-DHA-EPA-fish oil (FISH OIL) 1,000 mg (120 mg-180 mg) capsule Take 3 Caps by mouth daily.  glucosamine/chondr mejia A sod (OSTEO BI-FLEX PO) Take 1 Tab by mouth daily.  aspirin (ASPIRIN) 325 mg tablet Take 325 mg by mouth daily.            Objective:     Physical Exam:     Visit Vitals  BP (!) 162/92 (BP 1 Location: Left arm)   Pulse (!) 112   Temp 97.5 °F (36.4 °C)   Resp 18   Ht 6' (1.829 m)   Wt 142 kg (313 lb)   SpO2 96%   BMI 42.45 kg/m²       General: well developed, well nourished, pleasant , NAD. Hygiene good  Psych: cooperative. Pleasant. No anxiety or depression. Normal mood and affect. Neuro: alert and oriented to person/place/situation. Otherwise nonfocal.  Derm: Normal turgor for age, dry skin  HEENT: Normocephalic, atraumatic. EOMI. Conjunctiva clear. No scleral icterus. Neck: Normal range of motion. No masses. Chest: Good air entry bilaterally. Respirations nonlabored  Cardio: Normal heart sounds,no rubs, murmurs or gallops  Abdomen: Soft, nontender, nondistended, normoactive bowel sounds  Lower extremities: color normal; temperature normal. Hair growth is not present. Calves are supple, nontender, approximately equally sized in comparison.  Capillary refill <3 sec            Ulcer Description:   Wound Back Upper #1 (Active)   Dressing Status Clean, dry, and intact 5/26/2020 11:26 AM   Dressing Type Open to air 5/26/2020 11:26 AM   Non-staged Wound Description Partial thickness 5/11/2020  8:20 AM   Wound Length (cm) 0 cm 5/26/2020 11:26 AM   Wound Width (cm) 0 cm 5/26/2020 11:26 AM   Wound Depth (cm) 0 cm 5/26/2020 11:26 AM   Wound Surface Area (cm^2) 0 cm^2 5/26/2020 11:26 AM   Wound Volume (cm^3) 0 cm^3 5/26/2020 11:26 AM   Change in Wound Size % 100 5/26/2020 11:26 AM   Condition of Base Epithelializing 5/26/2020 11:26 AM   Condition of Edges Open 4/27/2020  8:20 AM   Epithelialization (%) 90 4/20/2020  8:25 AM   Tissue Type Percent Pink 100 5/11/2020  8:20 AM   Tissue Type Percent Red 100 4/27/2020  8:20 AM   Tissue Type Percent Yellow 10 3/23/2020  8:52 AM   Drainage Amount None 5/26/2020 11:26 AM   Drainage Color Serous 5/11/2020  8:20 AM   Wound Odor None 5/26/2020 11:26 AM   Cydney-wound Assessment Blanchable erythema 5/26/2020 11:26 AM   Cleansing and Cleansing Agents  Normal saline 5/26/2020 11:26 AM   Dressing Changed Changed/New 5/26/2020 11:26 AM   Procedure Tolerated Well 4/20/2020  8:25 AM   Number of days: 92       Wound Back Lower #2 (Active)   Dressing Status Removed 5/26/2020 11:26 AM   Non-staged Wound Description Full thickness 5/26/2020 11:26 AM   Wound Length (cm) 0.4 cm 5/26/2020 11:26 AM   Wound Width (cm) 0.4 cm 5/26/2020 11:26 AM   Wound Depth (cm) 0.2 cm 5/26/2020 11:26 AM   Wound Surface Area (cm^2) 0.16 cm^2 5/26/2020 11:26 AM   Wound Volume (cm^3) 0.03 cm^3 5/26/2020 11:26 AM   Change in Wound Size % 90.59 5/26/2020 11:26 AM   Condition of Edges Open 5/26/2020 11:26 AM   Tissue Type Percent Pink 100 5/11/2020  8:20 AM   Tissue Type Percent Red 100 4/27/2020  8:20 AM   Drainage Amount Moderate 5/26/2020 11:26 AM   Drainage Color Purulent 5/26/2020 11:26 AM   Wound Odor None 5/26/2020 11:26 AM   Cydney-wound Assessment Painful;Blanchable erythema 5/26/2020 11:26 AM   Cleansing and Cleansing Agents  Normal saline 5/26/2020 11:26 AM   Dressing Changed Changed/New 5/26/2020 11:26 AM   Procedure Tolerated Well 4/20/2020  8:25 AM   Number of days: 36       [REMOVED] Wound Back Left;Upper (Removed)   Number of days: 213         Data Review:   No results found for this or any previous visit (from the past 24 hour(s)). Assessment:     71 y.o. male with upper and lower back combined ulcer.     Problem List  Date Reviewed: 5/26/2020          Codes Class Noted    Hx of excision of epidermal inclusion cyst ICD-10-CM: Z98.890, Z87.2  ICD-9-CM: V15.29  8/29/2019    Overview Signed 8/29/2019  8:43 AM by Reanna Stephens MD     8/23/19, Dr. Saqib Parham; 10 cm x6 cm x6 cm epidermal inclusion cyst;             Benign neoplasm of sigmoid colon ICD-10-CM: D12.5  ICD-9-CM: 211.3  8/14/2019    Overview Signed 8/15/2019  8:39 AM by Reanna Stephens MD     Colonoscopy 4/2019             Gastro-esophageal reflux disease with esophagitis ICD-10-CM: K21.0  ICD-9-CM: 530.11  8/14/2019        Factor 5 Leiden mutation, heterozygous [facility] ICD-10-CM: B67.12  ICD-9-CM: 289.81  1/19/2018    Overview Addendum 8/15/2019  8:40 AM by MD Dr. Carlos Bourne, takes  mg every day, Xarelto 20 mg every day              History of DVT (deep vein thrombosis) ICD-10-CM: P11.489  ICD-9-CM: V12.51  3/24/2016    Overview Addendum 1/19/2018  2:07 PM by MD Dr. Carlos Bourne  Overview:   Associated with low homocystine and heterozygote for factor V Leiden. Last Assessment & Plan:   No folic acid because of his prostate cancer. No anticoagulation. Overview:   Associated with low homocystine and heterozygote for factor V Leiden. Last Assessment & Plan:   No folic acid because of his prostate cancer. No anticoagulation. Overview:   Associated with low homocystine and heterozygote for factor V Leiden. Last Assessment & Plan:   No folic acid because of his prostate cancer. No anticoagulation. Dx 2002. Allergic rhinitis ICD-10-CM: J30.9  ICD-9-CM: 477.9  12/2/2013        History of prostate cancer ICD-10-CM: Z85.46  ICD-9-CM: V10.46  12/2/2013    Overview Addendum 1/19/2018  1:55 PM by MD Dr. Carlos Bourne  Overview:   Seed implant therapy on December 29, 2003 indolent PSA at 0.055 in March 2015 and 0.066 in March 2016    Last Assessment & Plan:   We did not draw blood work this year. The PSA will be done along with the panel of lab work he has with Dr. Beatty. There are no signs or symptoms on today's history and physical exam to suggest progression of the prostate cancer. I believe that remains reasonable to monitor him once a year and check the PSA once a year. I will see him back in a year and a further blood work to Dr. Beatty. Overview:   Seed implant therapy on December 29, 2003 indolent PSA at 0.055 in March 2015 and 0.066 in March 2016    Last Assessment & Plan:   We did not draw blood work this year. The PSA will be done along with the panel of lab work he has with Dr. Beatty.   There are no signs or symptoms on today's history and physical exam to suggest progression of the prostate cancer. I believe that remains reasonable to monitor him once a year and check the PSA once a year. I will see him back in a year and a further blood work to Dr. Jay Pina. Dr. Jerrod Russell. History of sarcoma of soft tissue ICD-10-CM: Z85.831  ICD-9-CM: V10.89  12/2/2013    Overview Addendum 1/19/2018  1:56 PM by MD Dr. Jerrod Evangelista  Overview:   Soft tissue sarcoma, presenting in January 2002, over the right posterior pelvis, status post neoadjuvant chemotherapy followed by surgery at [facility] and adjuvant chemotherapy with Adriamycin plus adjuvant radiation therapy. Continuing complete remission through March 2015 with annual follow-up. Last Assessment & Plan:   Physical exam remains negative. We'll check him once yearly we see him for prostate cancer. Overview:   Soft tissue sarcoma, presenting in January 2002, over the right posterior pelvis, status post neoadjuvant chemotherapy followed by surgery at [facility] and adjuvant chemotherapy with Adriamycin plus adjuvant radiation therapy. Continuing complete remission through March 2015 with annual follow-up. Last Assessment & Plan:   Physical exam remains negative. We'll check him once yearly we see him for prostate cancer. Dr. Jerrod Russell. Diabetes mellitus type 2, controlled ([facility] ICD-10-CM: E11.9  ICD-9-CM: 250.00  9/10/2013        Obesity, morbid (HCC) ICD-10-CM: E66.01  ICD-9-CM: 278.01  Unknown        Degenerative disc disease, cervical ICD-10-CM: M50.30  ICD-9-CM: 722.4  Unknown        Hyperlipidemia ICD-10-CM: E78.5  ICD-9-CM: 272.4  Unknown        Hypertension ICD-10-CM: I10  ICD-9-CM: 401.9  Unknown        CKD (chronic kidney disease) ICD-10-CM: N18.9  ICD-9-CM: 801. 9  Unknown               Plan:     Orders Placed This Encounter    CULTURE, WOUND W GRAM STAIN     Standing Status:   Standing     Number of Occurrences:   1    WOUND CARE, DRESSING CHANGE     Lower back  Cleanse wound and periwound with wound cleanser or normal saline. Iodosorb- apply thin layer to wound bed, cover with cover dressing, change daily   Secure with gauze and Covrsite. Change daily. Wound Culture obtained today  Follow up in 3 weeks after scheduled procedure on June 3rd 2020     Standing Status:   Standing     Number of Occurrences:   1        Patient understood and agrees with plan. Questions answered. Follow-up Information     Follow up With Specialties Details Why Contact Info    [address] In 3 weeks  [facility] [address] [place] [id number]  [phone]             Any procedures done today in the [address] [facility] are documented in a separate note in [facility] and made part of this record by reference.      Dictated using voice recognition software; proofread, but unrecognized errors may exist.    Signed By: Elson Kayser., MD     May 26, 2020

## 2020-05-26 NOTE — H&P (VIEW-ONLY)
aDate: 2020 Name: Dorian Milian MRN: [id number] : 1950 Age: 71 y.o. Sex: male Izaiah Martínez MD  
 
 
CC:   
Chief Complaint Patient presents with  Cyst  
 
 
HPI: 
 
 Dorian Milian is a 71 y.o. male who presents for evaluation of a soft tissue mass as a referral from the [address] [facility]. I know the patient from excision of a large left upper back infected STM done on 19. The old wound has been slow to heal. He has a new STM in the left mid-thoracic region. It is painful with intermittent drainage. The patient would like to have this excised. PMH: 
 
Past Medical History:  
Diagnosis Date  Allergic sinusitis  Cholelithiasis  CKD (chronic kidney disease)   
 pt denies; CMP WNL (2019, 2019)  Degenerative disc disease, cervical   
 Diabetes mellitus type II   
 oral meds, does not check BG regularly, last hgba1c- 7.0 (19)  Elevated PSA  GERD (gastroesophageal reflux disease)   
 managed with medication  History of prostate cancer   Hx of malignant neoplasm of soft tissue   Hx of syncope  Hyperlipidemia  Hypertension  Migraine \"haven't had in years\"  Multiple nevi  Obesity, morbid ([address] bmi- 43  
 Personal history of colonic polyps 2015  
 adenomas  Sarcoma ([address]   
 with prostate cancer  Thromboembolus ([facility] ; 10/2018  
 right leg; family hx of factor 5 leiden- pt on xarelto and ASA  Ulnar nerve compression PSH: 
 
Past Surgical History:  
Procedure Laterality Date  HX COLONOSCOPY  3/24/15; most recent 3/2019 [facility]--two asc TA, three desc hyperplastic, one sigmoid TA--3 year recall  HX LAP CHOLECYSTECTOMY    HX MALIGNANT SKIN LESION EXCISION  2002  
 sarcoma [address] nerve in right elbow  HX PROSTATECTOMY  2002  
 not removed, radiation seeds MEDS: 
 
Current Outpatient Medications Medication Sig  
  niacin (NIASPAN) 1,000 mg Tb24 tab TAKE 1 TABLET BY MOUTH THREE TIMES A DAY  [facility]) 3.75 gram pwpk powder packet Take 3.75 g by mouth daily.  atorvastatin (LIPITOR) 40 mg tablet take 1 tablet by oral route  every day  dapagliflozin (FARXIGA) 10 mg tab tablet take 1 tablet by oral route  every day in the morning  ezetimibe (ZETIA) 10 mg tablet take 1 tablet by oral route  every day  fenofibric acid (TRILIPIX) 135 mg capsule take 1 capsule by oral route  every day  fexofenadine (ALLEGRA) 180 mg tablet take 1 tablet by oral route  every day as needed  metFORMIN (GLUCOPHAGE) 500 mg tablet TAKE 2 TABLETS TWICE A DAY  montelukast (SINGULAIR) 10 mg tablet take 1 tablet by oral route  every day in the evening as needed  SITagliptin (JANUVIA) 100 mg tablet take 1 tablet by oral route  every day  VESICARE 5 mg tablet TAKE 1 TABLET BY MOUTH EVERY DAY  silver-calcium alginate 8 X 12 \" bndg Pack in wound q3 days  miscellaneous medical supply [facility] 6x6 foam with no border dressing, 8x8 transparent dressing; pack wound with calcium alganate silver, cover with foam, secure with transparent dressing; change q3 days or if soiled;  Magnesium Oxide 500 mg cap Take 1 Cap by mouth daily.  multivitamin (ONE A DAY) tablet take 1 tablet by oral route  every day with food  cholecalciferol, vitamin D3, 4,000 unit cap Take 1 Cap by mouth daily.  omega 3-DHA-EPA-fish oil (FISH OIL) 1,000 mg (120 mg-180 mg) capsule Take 3 Caps by mouth daily.  glucosamine/chondr mejia A sod (OSTEO BI-FLEX PO) Take 1 Tab by mouth daily.  aspirin (ASPIRIN) 325 mg tablet Take 325 mg by mouth daily. No current facility-administered medications for this visit. ALLERGIES:   
 
No Known Allergies SH: Social History Tobacco Use  Smoking status: Former Smoker Packs/day: 1.00 Years: 10.00 Pack years: 10.00 Last attempt to quit: 5/1/1984 Years since quittin.0  Smokeless tobacco: Never Used Substance Use Topics  Alcohol use: Yes Comment: rarely  Drug use: No  
 
 
FH: 
 
Family History Problem Relation Age of Onset  Cancer Father   
     lung  Heart Attack Mother  Heart Disease Mother  Cancer Paternal Grandmother  Cancer Paternal Grandfather  Lung Disease Paternal Grandfather ROS: The patient has no difficulty with chest pain or shortness of breath. No fever or chills. Comprehensive 13 point review of systems was otherwise unremarkable except as noted above. Physical Exam:  
 
Visit Vitals /82 Pulse 66 Ht 6' (1.829 m) Wt 312 lb (141.5 kg) BMI 42.31 kg/m² General: Alert, oriented, obese white mall in no acute distress. Eyes: Sclera are clear. Conjunctiva and lids within normal limits. No icterus. Ears and Nose: no gross deformities to visual inspection, gross hearing intact Neck: Supple, trachea midline. No lymphadenopathy. Resp: Breathing is  non-labored. Lungs clear to auscultation without wheezing or rhonchi CV: RRR. No murmurs, rubs or gallops appreciated. Abd: soft non-tender and non-distended without peritoneal signs. +bs Back: Old left upper back wound has healed. New left mid-thoracic soft tissue mass with some serous drainage and surrounding erythema. Old right lower back sarcoma excision site with soft tissue deficit which is well-healed. Psych:  Mood and affect appropriate. Short-term memory and understanding intact Assessment/Plan:  Юлия Osullivan is a 71 y.o. male who has signs and symptoms consistent with a soft tissue mass of the left mid-thoracic region. 1.  Excision of a left mid-thoracic region soft tissue mass in the operating room. 2.  I went through the risks of bleeding, infection, anesthesia, hematoma/seroma formation and possible recurrence of the lesion.  I said it may be necessary to place a drain. It may be necessary to leave the wound open, if badly infected.  
 
Janae Frye MD 
  
 FACS   5/26/2020  9:35 AM

## 2020-05-26 NOTE — PROGRESS NOTES
05/26/20 1126   Wound Back Lower #2   Date First Assessed/Time First Assessed: 04/20/20 0827   Present on Hospital Admission: Yes  Primary Wound Type: Abscess  Location: Back  Wound Location Orientation: Lower  Wound Description: #2   Dressing Status Removed   Dressing Type   (coversite and iodosorb gel)   Non-staged Wound Description Full thickness   Wound Length (cm) 0.4 cm   Wound Width (cm) 0.4 cm   Wound Depth (cm) 0.2 cm   Wound Surface Area (cm^2) 0.16 cm^2   Wound Volume (cm^3) 0.03 cm^3   Change in Wound Size % 90.59   Condition of Base   (unable to visualize)   Condition of Edges Open   Drainage Amount Moderate   Drainage Color Purulent   Wound Odor None   Cydney-wound Assessment Painful;Blanchable erythema   Cleansing and Cleansing Agents  Normal saline   Dressing Changed Changed/New   Wound Back Upper #1   Date First Assessed/Time First Assessed: 02/24/20 0928   Primary Wound Type: Open incision/surgical site  Location: Back  Wound Location Orientation: Upper  Wound Description: #1   Dressing Status Clean, dry, and intact   Dressing Type Open to air   Wound Length (cm) 0 cm   Wound Width (cm) 0 cm   Wound Depth (cm) 0 cm   Wound Surface Area (cm^2) 0 cm^2   Wound Volume (cm^3) 0 cm^3   Change in Wound Size % 100   Condition of Base Epithelializing   Drainage Amount None   Wound Odor None   Cydney-wound Assessment Blanchable erythema   Cleansing and Cleansing Agents  Normal saline   Dressing Changed Changed/New           Patient is currently taking Asprin daily

## 2020-05-26 NOTE — WOUND CARE
[address] [place], [place] [address] [address], [address] Phone: [phone] Fax: [phone] Patient: Юлия Osullivan MRN: [id number]  SSN: [id number] YOB: 1950  Age: 71 y.o. Sex: male Return Appointment: 3 weeks with Sanju Salamanca MD 
 
Instructions: Lower back Cleanse wound and periwound with wound cleanser or normal saline. Iodosorb- apply thin layer to wound bed, cover with cover dressing, change daily Secure with gauze and Covrsite. Change daily. Wound Culture obtained today Follow up in 3 weeks after scheduled procedure on June 3rd 2020 Should you experience increased redness, swelling, pain, foul odor, size of wound(s), or have a temperature over 101 degrees please contact the [address] [place] [address] Road at [phone] or if after hours contact your primary care physician or go to the hospital emergency department. Signed By: Lou Ramos RN May 26, 2020

## 2020-05-28 ENCOUNTER — HOSPITAL ENCOUNTER (OUTPATIENT)
Dept: SURGERY | Age: 70
Discharge: HOME | End: 2020-05-28

## 2020-05-29 VITALS — BODY MASS INDEX: 41.72 KG/M2 | WEIGHT: 308 LBS | HEIGHT: 72 IN

## 2020-05-29 RX ORDER — AMLODIPINE AND VALSARTAN 5; 320 MG/1; MG/1
1 TABLET ORAL DAILY
COMMUNITY
End: 2021-01-26 | Stop reason: SDUPTHER

## 2020-05-29 NOTE — PERIOP NOTES
Patient verified name and . Order for consent found in EHR and matches case posting; patient verifies procedure. Consent for Dr. Lv Meneses to excise a left back soft tissue mass    Type 1B surgery, PAT phone assessment complete. Orders received. Labs per surgeon: none  Labs per anesthesia protocol: none      Patient answered medical/surgical history questions at their best of ability. All prior to admission medications documented in [facility] Care. Patient instructed to take the following medications the day of surgery according to anesthesia guidelines with a small sip of water: lipitor and singulair if needed. Hold all vitamins 7 days prior to surgery and NSAIDS 5 days prior to surgery. Prescription meds to hold: Pt states instructed by Dr. Lv Meneses to hold aspirin and xarelto 3 days prior to surgery. Patient instructed on the following:  >A negative Covid swab result is required to proceed with surgery; the swab will be collected 7 days prior to surgery. Pt states Covid testing done on 20.  > 1 visitor allowed at this time. >Arrive at [facility], time of arrival to be called the day before by 1700  >NPO after midnight including gum, mints, and ice chips  >Responsible adult must drive patient to the hospital, stay during surgery, and patient will need supervision 24 hours after anesthesia  >Use antibacterial soap in shower the night before surgery and on the morning of surgery  >All piercings must be removed prior to arrival.    >Leave all valuables (money and jewelry) at home but bring insurance card and ID on  DOS. >Do not wear make-up, nail polish, lotions, cologne, perfumes, powders, or oil on skin. Patient teach back successful and patient demonstrates knowledge of instruction.

## 2020-05-30 LAB
BACTERIA SPEC CULT: ABNORMAL
BACTERIA SPEC CULT: ABNORMAL
GRAM STN SPEC: ABNORMAL
GRAM STN SPEC: ABNORMAL
SERVICE CMNT-IMP: ABNORMAL

## 2020-06-02 ENCOUNTER — ANESTHESIA EVENT (OUTPATIENT)
Dept: SURGERY | Age: 70
End: 2020-06-02
Payer: MEDICARE

## 2020-06-03 ENCOUNTER — HOSPITAL ENCOUNTER (OUTPATIENT)
Age: 70
Setting detail: OUTPATIENT SURGERY
Discharge: HOME | End: 2020-06-03
Attending: SURGERY | Admitting: SURGERY
Payer: MEDICARE

## 2020-06-03 ENCOUNTER — ANESTHESIA (OUTPATIENT)
Dept: SURGERY | Age: 70
End: 2020-06-03
Payer: MEDICARE

## 2020-06-03 VITALS
TEMPERATURE: 97.9 F | OXYGEN SATURATION: 98 % | HEIGHT: 72 IN | BODY MASS INDEX: 42.26 KG/M2 | RESPIRATION RATE: 20 BRPM | DIASTOLIC BLOOD PRESSURE: 66 MMHG | HEART RATE: 89 BPM | WEIGHT: 312 LBS | SYSTOLIC BLOOD PRESSURE: 127 MMHG

## 2020-06-03 DIAGNOSIS — M79.89 SOFT TISSUE MASS: Primary | ICD-10-CM

## 2020-06-03 LAB — GLUCOSE BLD STRIP.AUTO-MCNC: 173 MG/DL (ref 65–100)

## 2020-06-03 PROCEDURE — 77030037088 HC TUBE ENDOTRACH ORAL NSL COVD-A: Performed by: ANESTHESIOLOGY

## 2020-06-03 PROCEDURE — 77030039425 HC BLD LARYNG TRULITE DISP TELE -A: Performed by: ANESTHESIOLOGY

## 2020-06-03 PROCEDURE — 76210000020 HC REC RM PH II FIRST 0.5 HR: Performed by: SURGERY

## 2020-06-03 PROCEDURE — 77030002916 HC SUT ETHLN J&J -A: Performed by: SURGERY

## 2020-06-03 PROCEDURE — 77030008462 HC STPLR SKN PROX J&J -A: Performed by: SURGERY

## 2020-06-03 PROCEDURE — 87205 SMEAR GRAM STAIN: CPT

## 2020-06-03 PROCEDURE — 77030031139 HC SUT VCRL2 J&J -A: Performed by: SURGERY

## 2020-06-03 PROCEDURE — 74011000250 HC RX REV CODE- 250: Performed by: NURSE ANESTHETIST, CERTIFIED REGISTERED

## 2020-06-03 PROCEDURE — 88305 TISSUE EXAM BY PATHOLOGIST: CPT

## 2020-06-03 PROCEDURE — 87076 CULTURE ANAEROBE IDENT EACH: CPT

## 2020-06-03 PROCEDURE — 74011250636 HC RX REV CODE- 250/636: Performed by: ANESTHESIOLOGY

## 2020-06-03 PROCEDURE — 76060000032 HC ANESTHESIA 0.5 TO 1 HR: Performed by: SURGERY

## 2020-06-03 PROCEDURE — 77030018836 HC SOL IRR NACL ICUM -A: Performed by: SURGERY

## 2020-06-03 PROCEDURE — 82962 GLUCOSE BLOOD TEST: CPT

## 2020-06-03 PROCEDURE — 74011000250 HC RX REV CODE- 250: Performed by: ANESTHESIOLOGY

## 2020-06-03 PROCEDURE — 87186 SC STD MICRODIL/AGAR DIL: CPT

## 2020-06-03 PROCEDURE — 87153 DNA/RNA SEQUENCING: CPT

## 2020-06-03 PROCEDURE — 76210000063 HC OR PH I REC FIRST 0.5 HR: Performed by: SURGERY

## 2020-06-03 PROCEDURE — 87077 CULTURE AEROBIC IDENTIFY: CPT

## 2020-06-03 PROCEDURE — 74011250636 HC RX REV CODE- 250/636: Performed by: NURSE ANESTHETIST, CERTIFIED REGISTERED

## 2020-06-03 PROCEDURE — 87075 CULTR BACTERIA EXCEPT BLOOD: CPT

## 2020-06-03 PROCEDURE — 74011250637 HC RX REV CODE- 250/637: Performed by: ANESTHESIOLOGY

## 2020-06-03 PROCEDURE — 76010000138 HC OR TIME 0.5 TO 1 HR: Performed by: SURGERY

## 2020-06-03 RX ORDER — LIDOCAINE HYDROCHLORIDE 20 MG/ML
INJECTION, SOLUTION EPIDURAL; INFILTRATION; INTRACAUDAL; PERINEURAL AS NEEDED
Status: DISCONTINUED | OUTPATIENT
Start: 2020-06-03 | End: 2020-06-03 | Stop reason: HOSPADM

## 2020-06-03 RX ORDER — FENTANYL CITRATE 50 UG/ML
INJECTION, SOLUTION INTRAMUSCULAR; INTRAVENOUS AS NEEDED
Status: DISCONTINUED | OUTPATIENT
Start: 2020-06-03 | End: 2020-06-03 | Stop reason: HOSPADM

## 2020-06-03 RX ORDER — ACETAMINOPHEN 500 MG
1000 TABLET ORAL ONCE
Status: COMPLETED | OUTPATIENT
Start: 2020-06-03 | End: 2020-06-03

## 2020-06-03 RX ORDER — FENTANYL CITRATE 50 UG/ML
100 INJECTION, SOLUTION INTRAMUSCULAR; INTRAVENOUS ONCE
Status: DISCONTINUED | OUTPATIENT
Start: 2020-06-03 | End: 2020-06-03 | Stop reason: HOSPADM

## 2020-06-03 RX ORDER — OXYCODONE AND ACETAMINOPHEN 5; 325 MG/1; MG/1
1-2 TABLET ORAL
Qty: 30 TAB | Refills: 0 | Status: SHIPPED | OUTPATIENT
Start: 2020-06-03 | End: 2020-06-06

## 2020-06-03 RX ORDER — SUCCINYLCHOLINE CHLORIDE 20 MG/ML
INJECTION INTRAMUSCULAR; INTRAVENOUS AS NEEDED
Status: DISCONTINUED | OUTPATIENT
Start: 2020-06-03 | End: 2020-06-03 | Stop reason: HOSPADM

## 2020-06-03 RX ORDER — SODIUM CHLORIDE, SODIUM LACTATE, POTASSIUM CHLORIDE, CALCIUM CHLORIDE 600; 310; 30; 20 MG/100ML; MG/100ML; MG/100ML; MG/100ML
75 INJECTION, SOLUTION INTRAVENOUS CONTINUOUS
Status: DISCONTINUED | OUTPATIENT
Start: 2020-06-03 | End: 2020-06-03 | Stop reason: HOSPADM

## 2020-06-03 RX ORDER — SULFAMETHOXAZOLE AND TRIMETHOPRIM 800; 160 MG/1; MG/1
1 TABLET ORAL 2 TIMES DAILY
Qty: 14 TAB | Refills: 0 | Status: SHIPPED | OUTPATIENT
Start: 2020-06-03 | End: 2020-06-24 | Stop reason: DRUGHIGH

## 2020-06-03 RX ORDER — ALBUTEROL SULFATE 0.83 MG/ML
2.5 SOLUTION RESPIRATORY (INHALATION) AS NEEDED
Status: DISCONTINUED | OUTPATIENT
Start: 2020-06-03 | End: 2020-06-03 | Stop reason: HOSPADM

## 2020-06-03 RX ORDER — LIDOCAINE HYDROCHLORIDE 10 MG/ML
0.1 INJECTION INFILTRATION; PERINEURAL AS NEEDED
Status: DISCONTINUED | OUTPATIENT
Start: 2020-06-03 | End: 2020-06-03 | Stop reason: HOSPADM

## 2020-06-03 RX ORDER — OXYCODONE HYDROCHLORIDE 5 MG/1
5 TABLET ORAL
Status: DISCONTINUED | OUTPATIENT
Start: 2020-06-03 | End: 2020-06-03 | Stop reason: HOSPADM

## 2020-06-03 RX ORDER — MIDAZOLAM HYDROCHLORIDE 1 MG/ML
2 INJECTION, SOLUTION INTRAMUSCULAR; INTRAVENOUS ONCE
Status: DISCONTINUED | OUTPATIENT
Start: 2020-06-03 | End: 2020-06-03 | Stop reason: HOSPADM

## 2020-06-03 RX ORDER — MIDAZOLAM HYDROCHLORIDE 1 MG/ML
2 INJECTION, SOLUTION INTRAMUSCULAR; INTRAVENOUS
Status: DISCONTINUED | OUTPATIENT
Start: 2020-06-03 | End: 2020-06-03 | Stop reason: HOSPADM

## 2020-06-03 RX ORDER — SODIUM CHLORIDE, SODIUM LACTATE, POTASSIUM CHLORIDE, CALCIUM CHLORIDE 600; 310; 30; 20 MG/100ML; MG/100ML; MG/100ML; MG/100ML
50 INJECTION, SOLUTION INTRAVENOUS CONTINUOUS
Status: DISCONTINUED | OUTPATIENT
Start: 2020-06-03 | End: 2020-06-03 | Stop reason: HOSPADM

## 2020-06-03 RX ORDER — HYDROMORPHONE HYDROCHLORIDE 2 MG/ML
0.5 INJECTION, SOLUTION INTRAMUSCULAR; INTRAVENOUS; SUBCUTANEOUS
Status: DISCONTINUED | OUTPATIENT
Start: 2020-06-03 | End: 2020-06-03 | Stop reason: HOSPADM

## 2020-06-03 RX ORDER — ROCURONIUM BROMIDE 10 MG/ML
INJECTION, SOLUTION INTRAVENOUS AS NEEDED
Status: DISCONTINUED | OUTPATIENT
Start: 2020-06-03 | End: 2020-06-03 | Stop reason: HOSPADM

## 2020-06-03 RX ORDER — EPHEDRINE SULFATE/0.9% NACL/PF 50 MG/5 ML
SYRINGE (ML) INTRAVENOUS AS NEEDED
Status: DISCONTINUED | OUTPATIENT
Start: 2020-06-03 | End: 2020-06-03 | Stop reason: HOSPADM

## 2020-06-03 RX ORDER — PROPOFOL 10 MG/ML
INJECTION, EMULSION INTRAVENOUS AS NEEDED
Status: DISCONTINUED | OUTPATIENT
Start: 2020-06-03 | End: 2020-06-03 | Stop reason: HOSPADM

## 2020-06-03 RX ADMIN — SUCCINYLCHOLINE CHLORIDE 200 MG: 20 INJECTION, SOLUTION INTRAMUSCULAR; INTRAVENOUS at 12:48

## 2020-06-03 RX ADMIN — ACETAMINOPHEN 1000 MG: 500 TABLET, FILM COATED ORAL at 11:33

## 2020-06-03 RX ADMIN — Medication 10 MG: at 13:18

## 2020-06-03 RX ADMIN — LIDOCAINE HYDROCHLORIDE 0.1 ML: 10 INJECTION, SOLUTION INFILTRATION; PERINEURAL at 11:49

## 2020-06-03 RX ADMIN — PROPOFOL 200 MG: 10 INJECTION, EMULSION INTRAVENOUS at 12:48

## 2020-06-03 RX ADMIN — LIDOCAINE HYDROCHLORIDE 100 MG: 20 INJECTION, SOLUTION EPIDURAL; INFILTRATION; INTRACAUDAL; PERINEURAL at 12:48

## 2020-06-03 RX ADMIN — FENTANYL CITRATE 100 MCG: 50 INJECTION INTRAMUSCULAR; INTRAVENOUS at 13:01

## 2020-06-03 RX ADMIN — ROCURONIUM BROMIDE 5 MG: 10 INJECTION, SOLUTION INTRAVENOUS at 13:00

## 2020-06-03 RX ADMIN — ROCURONIUM BROMIDE 5 MG: 10 INJECTION, SOLUTION INTRAVENOUS at 12:48

## 2020-06-03 RX ADMIN — SODIUM CHLORIDE, SODIUM LACTATE, POTASSIUM CHLORIDE, AND CALCIUM CHLORIDE 75 ML/HR: 600; 310; 30; 20 INJECTION, SOLUTION INTRAVENOUS at 11:50

## 2020-06-03 NOTE — PERIOP NOTES
Original OR dressing noted to have moderate amount of serosanguinous drainage, reinforced with sterile gauze 4x4 and paper tape.

## 2020-06-03 NOTE — DISCHARGE INSTRUCTIONS
1. Diet as tolerated. 2. Showering is allowed, but no tub baths, hot tubs or swimming. 3. Drainage is common from the wounds. Change the dressings as needed. Call our office if the wounds become reddened, tender, feel warm to the touch or pus starts to drain from the wound. 4. Take prescribed pain medication as directed, usually Percocet, Norco, Ultram or Dilaudid. Take over the counter medication for minor pain. 5. Ice may be applied intermittently to the surgical site or sites. 6. Call or office, [phone], if problems arise. 7. Follow up in the office at the assigned time. 8. Resume all medications as taken per surgery, unless specifically instructed not to take certain ones. 9. No lifting more than 25 pounds until told otherwise. 10. Driving is allowed 3 days after surgery as long as you feel comfortable enough to drive and have not taken any prescription pain medication prior to driving. 11. Resume Xarelto on 6/5/20    After general anesthesia or intravenous sedation, for 24 hours or while taking prescription Narcotics:  · Limit your activities  · A responsible adult needs to be with you for the next 24 hours  · Do not drive and operate hazardous machinery  · Do not make important personal or business decisions  · Do  not drink alcoholic beverages  · If you have not urinated within 8 hours after discharge, please contact your surgeon on call. · If you have sleep apnea and have a CPAP machine, please use it for all naps and sleeping. · Please use caution when taking narcotics and any of your home medications that may cause drowsiness. *  Please give a list of your current medications to your Primary Care Provider. *  Please update this list whenever your medications are discontinued, doses are      changed, or new medications (including over-the-counter products) are added. *  Please carry medication information at all times in case of emergency situations.     These are general instructions for a healthy lifestyle:  No smoking/ No tobacco products/ Avoid exposure to second hand smoke  Surgeon General's Warning:  Quitting smoking now greatly reduces serious risk to your health. Obesity, smoking, and sedentary lifestyle greatly increases your risk for illness  A healthy diet, regular physical exercise & weight monitoring are important for maintaining a healthy lifestyle    You may be retaining fluid if you have a history of heart failure or if you experience any of the following symptoms:  Weight gain of 3 pounds or more overnight or 5 pounds in a week, increased swelling in our hands or feet or shortness of breath while lying flat in bed. Please call your doctor as soon as you notice any of these symptoms; do not wait until your next office visit.

## 2020-06-03 NOTE — INTERVAL H&P NOTE
Update History & Physical    The Patient's History and Physical of 5/26/20 was reviewed with the patient and I examined the patient. There was no change. The surgical site was confirmed by the patient and me. Plan:  The risk, benefits, expected outcome, and alternative to the recommended procedure have been discussed with the patient. Patient understands and wants to proceed with the procedure.     Electronically signed by Mila Howard MD on 6/3/2020 at 11:06 AM

## 2020-06-03 NOTE — OP NOTES
[place]  OPERATIVE REPORT    Name:  Richie Dawson  MR#:  [id number]  :  1950  ACCOUNT #:  [de-identified]  DATE OF SERVICE:  2020    PREOPERATIVE DIAGNOSIS:  Left back soft tissue mass. POSTOPERATIVE DIAGNOSIS:  Left back soft tissue mass, appears to be an infected epidermal inclusion cyst with some thick purulent-type material found within the cyst.    PROCEDURE PERFORMED:  Excision of a left back soft tissue mass measuring 6 cm x 4 cm x 3 cm, this was skin and soft tissue only, no fascia, muscle, bone involved. SURGEON:  Irineo Best. Candie Crouch MD    ASSISTANT:  None. ANESTHESIA:  General endotracheal anesthesia with Dr. Jaylan Farfan and Crystal Torres. COMPLICATIONS:  None. SPECIMENS REMOVED:  1. Culture swab of cyst fluid. 2.  The left back soft tissue mass sent to Pathology. IMPLANTS:  None. ESTIMATED BLOOD LOSS:  Less than 10 mL. DRAINS:  None. HISTORY:  This is a 66-year-old male who I had seen in the past for excision of a very large right upper back soft tissue mass and this had been slow to heal.  He was referred to the [address] [place] [address],[address], where the wound has finally healed. He was referred back by Dr. Blake Bunn for evaluation of a new soft tissue mass in the left mid thoracic area. I saw the patient in the office, scheduled for excision as it appeared to be infected and also very inflamed and painful. PROCEDURE:  He was seen in the preop area where the area was marked. He was then transported to room #3 [address] [place] [address].  He was placed on the operative table in the supine position where general endotracheal anesthesia was administered by Dr. Jaylan Farfan and Crystal Torres. He was then placed in a modified right lateral decubitus position and on a beanbag. Time-out was done identifying the patient, surgeon, procedure, and his birth date of 1950. When everyone in the room agreed, we began the procedure.   I made an oval-type incision with a 15 scalpel blade around this lesion. We then divided the soft tissue. We came upon a cyst.  The cyst ruptured as we were pulling on the tissue and purulent material came out. I took a culture swab of this and sent it to Microbiology for culture and sensitivity. We then excised skin and soft tissue measuring 6 cm x 4 cm x 3 cm that was just skin and soft tissue. It was excised and sent to Pathology for evaluation. The wound was irrigated and hemostasis was achieved with electrocautery. Flaps were raised circumferentially. The deep tissue was closed with interrupted sutures of 0 Vicryl undyed. Skin was closed with vertical mattress sutures of 2-0 nylon and surgical staples. I injected 30 mL 0.5% Marcaine around this site. The patient tolerated the procedure well, was extubated and brought to the recovery room in stable condition.       Juanito Driscoll MD      [id number]  D:  06/03/2020 13:31  T:  06/03/2020 19:18  JOB #:  [id number]

## 2020-06-03 NOTE — ANESTHESIA POSTPROCEDURE EVALUATION
Procedure(s):  EXCISION SOFT TISSUE MASS OF BACK.     general    Anesthesia Post Evaluation      Multimodal analgesia: multimodal analgesia used between 6 hours prior to anesthesia start to PACU discharge  Patient location during evaluation: bedside  Patient participation: complete - patient participated  Level of consciousness: responsive to verbal stimuli  Pain management: adequate  Airway patency: patent  Anesthetic complications: no  Cardiovascular status: hemodynamically stable  Respiratory status: spontaneous ventilation  Hydration status: stable        INITIAL Post-op Vital signs:   Vitals Value Taken Time   /70 6/3/2020  1:40 PM   Temp 36.6 °C (97.9 °F) 6/3/2020  1:28 PM   Pulse 104 6/3/2020  1:40 PM   Resp 20 6/3/2020  1:40 PM   SpO2 94 % 6/3/2020  1:40 PM

## 2020-06-03 NOTE — BRIEF OP NOTE
Brief Postoperative Note    Patient: Regina Arreguin  YOB: 1950  MRN: [id number]    Date of Procedure: 6/3/2020     Pre-Op Diagnosis: LEFT BACK SOFT TISSUE MASS    Post-Op Diagnosis: SAME     Procedure(s):  EXCISION OF A LEFT BACK SOFT TISSUE MASS MEASURING 6 CM X 4 CM X 3 CM    Surgeon(s):   Lety Love MD    Surgical Assistant: None    Anesthesia: General plus local    Estimated Blood Loss (mL): Minimal    Complications: None    Specimens:   ID Type Source Tests Collected by Time Destination   1 : left back soft tissue mass Preservative   Lety Love MD 6/3/2020 1314 Pathology   1 : abscess fluid Wound  CULTURE, ANAEROBIC, CULTURE, WOUND W Lex Malone MD 6/3/2020 1314 Microbiology        Implants: * No implants in log *    Drains: * No LDAs found *    Findings: See dictated note    Electronically Signed by Matty Charlton MD on 6/3/2020 at 1:21 PM

## 2020-06-06 LAB
BACTERIA SPEC CULT: ABNORMAL
GRAM STN SPEC: ABNORMAL
GRAM STN SPEC: ABNORMAL
SERVICE CMNT-IMP: ABNORMAL

## 2020-06-25 LAB
BACTERIA SPEC CULT: NORMAL
SERVICE CMNT-IMP: NORMAL

## 2020-06-26 LAB
Lab: NORMAL
REFERENCE LAB,REFLB: NORMAL
TEST DESCRIPTION:,ATST: NORMAL

## 2022-03-19 PROBLEM — D12.5 BENIGN NEOPLASM OF SIGMOID COLON: Status: ACTIVE | Noted: 2019-08-14

## 2022-03-19 PROBLEM — K21.00 GASTRO-ESOPHAGEAL REFLUX DISEASE WITH ESOPHAGITIS: Status: ACTIVE | Noted: 2019-08-14

## 2022-03-19 PROBLEM — D68.51 FACTOR 5 LEIDEN MUTATION, HETEROZYGOUS (HCC): Status: ACTIVE | Noted: 2018-01-19

## 2022-03-20 PROBLEM — Z98.890 HX OF EXCISION OF EPIDERMAL INCLUSION CYST: Status: ACTIVE | Noted: 2019-08-29

## 2022-03-20 PROBLEM — Z87.2 HX OF EXCISION OF EPIDERMAL INCLUSION CYST: Status: ACTIVE | Noted: 2019-08-29

## 2022-05-24 ENCOUNTER — OFFICE VISIT (OUTPATIENT)
Dept: INTERNAL MEDICINE CLINIC | Facility: CLINIC | Age: 72
End: 2022-05-24
Payer: MEDICARE

## 2022-05-24 VITALS
DIASTOLIC BLOOD PRESSURE: 75 MMHG | SYSTOLIC BLOOD PRESSURE: 141 MMHG | WEIGHT: 273 LBS | OXYGEN SATURATION: 98 % | HEART RATE: 70 BPM | RESPIRATION RATE: 16 BRPM | BODY MASS INDEX: 36.98 KG/M2 | HEIGHT: 72 IN | TEMPERATURE: 98.3 F

## 2022-05-24 DIAGNOSIS — E78.2 MIXED HYPERLIPIDEMIA: ICD-10-CM

## 2022-05-24 DIAGNOSIS — J30.1 SEASONAL ALLERGIC RHINITIS DUE TO POLLEN: ICD-10-CM

## 2022-05-24 DIAGNOSIS — E55.9 VITAMIN D INSUFFICIENCY: ICD-10-CM

## 2022-05-24 DIAGNOSIS — Z00.00 MEDICARE ANNUAL WELLNESS VISIT, SUBSEQUENT: Primary | ICD-10-CM

## 2022-05-24 DIAGNOSIS — D68.51 FACTOR 5 LEIDEN MUTATION, HETEROZYGOUS (HCC): ICD-10-CM

## 2022-05-24 DIAGNOSIS — E66.01 OBESITY, MORBID (HCC): ICD-10-CM

## 2022-05-24 DIAGNOSIS — E11.9 CONTROLLED TYPE 2 DIABETES MELLITUS WITHOUT COMPLICATION, WITHOUT LONG-TERM CURRENT USE OF INSULIN (HCC): ICD-10-CM

## 2022-05-24 DIAGNOSIS — K21.00 GASTROESOPHAGEAL REFLUX DISEASE WITH ESOPHAGITIS WITHOUT HEMORRHAGE: ICD-10-CM

## 2022-05-24 DIAGNOSIS — I10 PRIMARY HYPERTENSION: ICD-10-CM

## 2022-05-24 PROCEDURE — 99213 OFFICE O/P EST LOW 20 MIN: CPT | Performed by: INTERNAL MEDICINE

## 2022-05-24 PROCEDURE — 3017F COLORECTAL CA SCREEN DOC REV: CPT | Performed by: INTERNAL MEDICINE

## 2022-05-24 PROCEDURE — G0439 PPPS, SUBSEQ VISIT: HCPCS | Performed by: INTERNAL MEDICINE

## 2022-05-24 PROCEDURE — G8417 CALC BMI ABV UP PARAM F/U: HCPCS | Performed by: INTERNAL MEDICINE

## 2022-05-24 PROCEDURE — 1036F TOBACCO NON-USER: CPT | Performed by: INTERNAL MEDICINE

## 2022-05-24 PROCEDURE — 3044F HG A1C LEVEL LT 7.0%: CPT | Performed by: INTERNAL MEDICINE

## 2022-05-24 PROCEDURE — 2022F DILAT RTA XM EVC RTNOPTHY: CPT | Performed by: INTERNAL MEDICINE

## 2022-05-24 PROCEDURE — 1123F ACP DISCUSS/DSCN MKR DOCD: CPT | Performed by: INTERNAL MEDICINE

## 2022-05-24 PROCEDURE — G8427 DOCREV CUR MEDS BY ELIG CLIN: HCPCS | Performed by: INTERNAL MEDICINE

## 2022-05-24 RX ORDER — FLASH GLUCOSE SENSOR
KIT MISCELLANEOUS
COMMUNITY
Start: 2022-05-03

## 2022-05-24 ASSESSMENT — PATIENT HEALTH QUESTIONNAIRE - PHQ9
SUM OF ALL RESPONSES TO PHQ QUESTIONS 1-9: 0
SUM OF ALL RESPONSES TO PHQ QUESTIONS 1-9: 0
2. FEELING DOWN, DEPRESSED OR HOPELESS: 0
SUM OF ALL RESPONSES TO PHQ9 QUESTIONS 1 & 2: 0
SUM OF ALL RESPONSES TO PHQ QUESTIONS 1-9: 0
1. LITTLE INTEREST OR PLEASURE IN DOING THINGS: 0
SUM OF ALL RESPONSES TO PHQ QUESTIONS 1-9: 0

## 2022-05-24 ASSESSMENT — LIFESTYLE VARIABLES: HOW OFTEN DO YOU HAVE A DRINK CONTAINING ALCOHOL: MONTHLY OR LESS

## 2022-05-24 ASSESSMENT — ENCOUNTER SYMPTOMS
ABDOMINAL PAIN: 0
RHINORRHEA: 0
BLOOD IN STOOL: 0
COUGH: 0
SHORTNESS OF BREATH: 0

## 2022-05-24 NOTE — PATIENT INSTRUCTIONS
Personalized Preventive Plan for Janel Lopez - 5/24/2022  Medicare offers a range of preventive health benefits. Some of the tests and screenings are paid in full while other may be subject to a deductible, co-insurance, and/or copay. Some of these benefits include a comprehensive review of your medical history including lifestyle, illnesses that may run in your family, and various assessments and screenings as appropriate. After reviewing your medical record and screening and assessments performed today your provider may have ordered immunizations, labs, imaging, and/or referrals for you. A list of these orders (if applicable) as well as your Preventive Care list are included within your After Visit Summary for your review. Other Preventive Recommendations:    · A preventive eye exam performed by an eye specialist is recommended every 1-2 years to screen for glaucoma; cataracts, macular degeneration, and other eye disorders. · A preventive dental visit is recommended every 6 months. · Try to get at least 150 minutes of exercise per week or 10,000 steps per day on a pedometer . · Order or download the FREE \"Exercise & Physical Activity: Your Everyday Guide\" from [employer]. Call 1-190.997.4273 or search [employer] online. · You need 9330-4092 mg of calcium and 7269-7039 IU of vitamin D per day. It is possible to meet your calcium requirement with diet alone, but a vitamin D supplement is usually necessary to meet this goal.  · When exposed to the sun, use a sunscreen that protects against both UVA and UVB radiation with an SPF of 30 or greater. Reapply every 2 to 3 hours or after sweating, drying off with a towel, or swimming. · Always wear a seat belt when traveling in a car. Always wear a helmet when riding a bicycle or motorcycle.

## 2022-05-24 NOTE — PROGRESS NOTES
[facility]      [facility]    Patient Name: Aquiles Garcia  :  1950    Subjective:    Chief Complaint:  Chief Complaint   Patient presents with    Medicare AWV     Pt is here for Medicare Wellness and to review labs. HPI Here for Medicare Wellness visit; patient had fasting labs done recently and results were reviewed in detail today; He has lost over 30 lbs in the past year due to healthy eating, avoiding sweets and starchy foods; he plans to start being more active; He had episode of anaphylaxis in November; he woke up with tingling in his lips, face, with swelling; he became light headed, blood pressure was low; he passed out in the bathroom and his daughter called 911; he has since seen the Allergist, was prescribed Epi pens in case of recurrence;   Colonoscopy: 2019  Eye exam: 10/2021  Dental exam: has appointment next month  Pneumovax:   Prevnar 13: 2016  Shingrix:   Tdap:   Fluvax: 2021  Jessica Fulton 19: 3/14, 2021  Deanna Bill booster: 2022  HTN:  Patient compliant with taking blood pressure medications: Yes  Discussed importance of following low sodium DASH diet, increasing physical activity, taking medications as ordered, decreasing alcohol intake, keeping f/u visits to recheck blood pressure, monitoring blood pressure at home and keeping a log, with goal blood pressure <140/90. Home bp readings are: does not monitor  Diabetes:  Blood sugar readings: good  Patient compliant with low carbohydrate diet, with occasional dietary indiscretions. Patient is sedentary and does not exercise. Regular exercise recommended.   Patient advised on foot care and regular foot exam.  Last eye exam: 10/2021  Last HgbA1c:   Lab Results   Component Value Date    LABA1C 5.7 (H) 2022     Lab Results   Component Value Date     2022        Past Medical History:   Diagnosis Date    Allergic sinusitis     Cholelithiasis     CKD (chronic kidney disease) , Rfl:     celecoxib (CELEBREX) 100 MG capsule, Take 100 mg by mouth 2 times daily, Disp: , Rfl:     Cholecalciferol 100 MCG (4000 UT) CAPS, Take 1 capsule by mouth daily, Disp: , Rfl:     choline fenofibrate (TRILIPIX) 135 MG CPDR delayed release capsule, Take 1 capsule by oral route every day, Disp: , Rfl:     colesevelam (WELCHOL) 3.75 g PACK powder, Take 3.75 g by mouth daily, Disp: , Rfl:     dapagliflozin (FARXIGA) 10 MG tablet, Take 1 tablet by oral route every day in the morning, Disp: , Rfl:     EPINEPHrine (EPIPEN) 0.3 MG/0.3ML SOAJ injection, Inject 0.3 mg into the muscle, Disp: , Rfl:     ezetimibe (ZETIA) 10 MG tablet, Take 1 tablet by oral route every day, Disp: , Rfl:     fexofenadine (ALLEGRA) 180 MG tablet, Take 1 tablet by oral route every day as needed, Disp: , Rfl:     fluticasone (FLONASE) 50 MCG/ACT nasal spray, SPRAY 2 SPRAYS INTO EACH NOSTRIL EVERY DAY, Disp: , Rfl:     Magnesium 500 MG CAPS, Take 1 capsule by mouth daily, Disp: , Rfl:     metFORMIN (GLUCOPHAGE-XR) 500 MG extended release tablet, Take 500 mg by mouth Daily with supper, Disp: , Rfl:     montelukast (SINGULAIR) 10 MG tablet, Take 1 tablet by oral route every day in the evening as needed, Disp: , Rfl:     Niacin ER 1000 MG TBCR, TAKE 1 TABLET BY MOUTH THREE TIMES A DAY, Disp: , Rfl:     rivaroxaban (XARELTO) 20 MG TABS tablet, Take 20 mg by mouth daily (with breakfast), Disp: , Rfl:     SITagliptin (JANUVIA) 100 MG tablet, Take 1 tablet by oral route every day, Disp: , Rfl:     solifenacin (VESICARE) 5 MG tablet, TAKE 1 TABLET BY MOUTH EVERY DAY, Disp: , Rfl:     No Known Allergies    Review of Systems   Constitutional: Negative for chills, fatigue and fever. HENT: Negative for congestion, postnasal drip and rhinorrhea. Eyes: Negative for visual disturbance. Respiratory: Negative for cough and shortness of breath. Cardiovascular: Negative for chest pain and palpitations.    Gastrointestinal: Negative for abdominal pain and blood in stool. Genitourinary: Negative for dysuria and frequency. Musculoskeletal: Negative for arthralgias and myalgias. Skin: Negative. Neurological: Negative for numbness and headaches. Psychiatric/Behavioral: Negative for dysphoric mood and sleep disturbance. The patient is not nervous/anxious. All other systems reviewed and are negative. Objective:  BP (!) 141/75   Pulse 70   Temp 98.3 °F (36.8 °C) (Temporal)   Resp 16   Ht 6' (1.829 m)   Wt 273 lb (123.8 kg)   SpO2 98%   BMI 37.03 kg/m²     Examination:  Physical Exam  Vitals reviewed. Constitutional:       Appearance: Normal appearance. He is obese. HENT:      Head: Normocephalic and atraumatic. Nose: Nose normal.      Mouth/Throat:      Mouth: Mucous membranes are moist.      Pharynx: Oropharynx is clear. Eyes:      Extraocular Movements: Extraocular movements intact. Conjunctiva/sclera: Conjunctivae normal.      Pupils: Pupils are equal, round, and reactive to light. Cardiovascular:      Rate and Rhythm: Normal rate and regular rhythm. Pulses: Normal pulses. Heart sounds: Normal heart sounds. Pulmonary:      Effort: Pulmonary effort is normal.      Breath sounds: Normal breath sounds. Abdominal:      General: Abdomen is flat. Bowel sounds are normal.      Palpations: Abdomen is soft. Musculoskeletal:         General: Normal range of motion. Cervical back: Normal range of motion and neck supple. Skin:     General: Skin is warm and dry. Neurological:      General: No focal deficit present. Mental Status: He is alert and oriented to person, place, and time. Mental status is at baseline. Psychiatric:         Mood and Affect: Mood normal.         Behavior: Behavior normal.           Assessment/Plan:    Juany Beard was seen today for medicare awv.     Diagnoses and all orders for this visit:    Medicare annual wellness visit, subsequent  Annual Exam: Goals for good health were addressed at today's visit:  -Reach and stay at a healthy weight. This can lower your risk of obesity, diabetes, heart disease and high blood pressure. -Get at least 30 minutes of physical activity daily. Walking, running, swimming, cycling or playing sports are good options.  -Do not smoke or allow others to smoke around you. -Use birth control if you do not want to have children at this time and barrier contraception to prevent or decrease risk of exposure to sexually transmitted diseases. -Use sunscreen daily, SPF of 15 or higher are best.  -See a dentist at least once yearly for checkups. -Have vision checked every 1-2 years.  -Wear a seat belt in the car.  -Avoid drinking in excess of 2 alcoholic drinks per day for men and 1 drink a day for women, or avoid drinking altogether.   -Watch closely for changes in our health and contact your doctor with any concerning problems or symptoms  -Age appropriate screening tests were updated and discussed at today's visit. -Immunization history was reviewed and updated at today's visit. Mixed hyperlipidemia  Stable, continue medication, diet. -     Comprehensive Metabolic Panel; Future  -     Lipid Panel; Future  -     Urinalysis W/ Rflx Microscopic; Future    Obesity, morbid ([address]  Recommended low fat, low cholesterol, low carbohydrate diet. Recommended increasing fresh fruits and vegetables in diet, eating lean meats, like fish and poultry, that are baked, broiled or grilled, not fried. Recommended regular exercise, 30 minutes of aerobic activity 4-5 days a week. Recommended monitoring weight closely and keeping follow-up appointments for recheck. Factor 5 Leiden mutation, heterozygous ([address]  Stable. Controlled type 2 diabetes mellitus without complication, without long-term current use of insulin (HCC)  Stable, continue medication, diet. -     Hemoglobin A1c with eAG;  Future    Gastroesophageal reflux disease with esophagitis without hemorrhage  Stable, continue medication, diet. -     CBC with Auto Differential; Future    Seasonal allergic rhinitis due to pollen  Stable. Primary hypertension  Stable, continue medication, diet. Vitamin D insufficiency  -     Vitamin D 25 Hydroxy; Future          Follow-up and Dispositions    · Return in 6 months (on 11/24/2022), or if symptoms worsen or fail to improve, for Medicare Annual Wellness Visit in 1 year, f/u w/ labs. Medication Reconciliation:  Current Medications Verified: Current medications/ immunizations were reviewed, including purpose, with patient. Family History, Social History, Current and Past Medical History was reviewed with patient and updated at today's office visit. Medication Reconciliation list was given to patient/ family. Patient was advised to discard old medication lists and provide all providers with current list at each visit and carry list with them in case of emergency. Completed By:   Ksenia Cody MD    [facility]  [address] [place] [address], [address] [facility] [address]  [place], [address]  [phone]  [phone] fax  1:21 PM   Medicare Annual Wellness Visit    Iggy Clarke is here for [place] AWV     Assessment & Plan    See above. Return in 6 months (on 11/24/2022), or if symptoms worsen or fail to improve, for Medicare Annual Wellness Visit in 1 year, f/u w/ labs. Subjective   The following acute and/or chronic problems were also addressed today:  As above. Patient's complete Health Risk Assessment and screening values have been reviewed and are found in Flowsheets. The following problems were reviewed today and where indicated follow up appointments were made and/or referrals ordered.     Positive Risk Factor Screenings with Interventions:               General Health and ACP:  General  In general, how would you say your health is?: Good  In the past 7 days, have you experienced any of the following: New or Increased Pain, New or Increased Fatigue, Loneliness, Social Isolation, Stress or Anger?: No  Do you get the social and emotional support that you need?: Yes  Do you have a Living Will?: (!) No    Advance Directives     Power of Sae Sandhu Will ACP-Advance Directive ACP-Power of     Not on File Not on File Not on File Not on File      General Health Risk Interventions:  · will discuss at today's visit    Health Habits/Nutrition:     Physical Activity: Inactive    Days of Exercise per Week: 0 days    Minutes of Exercise per Session: 0 min     Have you lost any weight without trying in the past 3 months?: No  Body mass index: (!) 37.02  Have you seen the dentist within the past year?: Yes    Health Habits/Nutrition Interventions:  · Inadequate physical activity:  patient agrees to exercise for at least 150 minutes/week     Safety:  Do you have working smoke detectors?: Yes  Do you have any tripping hazards - loose or unsecured carpets or rugs?: No  Do you have any tripping hazards - clutter in doorways, halls, or stairs?: No  Do you have either shower bars, grab bars, non-slip mats or non-slip surfaces in your shower or bathtub?: (!) No  Do all of your stairways have a railing or banister?: Yes  Do you always fasten your seatbelt when you are in a car?: Yes    Safety Interventions:  · Home safety tips provided           Objective   Vitals:    05/24/22 1143   BP: (!) 141/75   Pulse: 70   Resp: 16   Temp: 98.3 °F (36.8 °C)   TempSrc: Temporal   SpO2: 98%   Weight: 273 lb (123.8 kg)   Height: 6' (1.829 m)      Body mass index is 37.03 kg/m².         General Appearance: alert and oriented to person, place and time, well developed and well- nourished, in no acute distress  Skin: warm and dry, no rash or erythema  Head: normocephalic and atraumatic  Eyes: pupils equal, round, and reactive to light, extraocular eye movements intact, conjunctivae normal  ENT: tympanic membrane, external ear and ear canal normal bilaterally, nose without deformity, nasal mucosa and turbinates normal without polyps  Neck: supple and non-tender without mass, no thyromegaly or thyroid nodules, no cervical lymphadenopathy  Pulmonary/Chest: clear to auscultation bilaterally- no wheezes, rales or rhonchi, normal air movement, no respiratory distress  Cardiovascular: normal rate, regular rhythm, normal S1 and S2, no murmurs, rubs, clicks, or gallops, distal pulses intact, no carotid bruits  Abdomen: soft, non-tender, non-distended, normal bowel sounds, no masses or organomegaly  Extremities: no cyanosis, clubbing or edema  Musculoskeletal: normal range of motion, no joint swelling, deformity or tenderness  Neurologic: reflexes normal and symmetric, no cranial nerve deficit, gait, coordination and speech normal       No Known Allergies  Prior to Visit Medications    Medication Sig Taking?  Authorizing Provider   Continuous Blood Gluc Sensor (BinpressYLE TORRI 14 DAY SENSOR) [facility] [place], [place] CHECK BLOOD GLUCOSE 3 TIMES A DAY Yes Historical Provider, MD   aspirin 325 MG tablet Take 325 mg by mouth daily Yes Ar Automatic Reconciliation   atorvastatin (LIPITOR) 40 MG tablet Take 1 tablet by oral route every day Yes Ar Automatic Reconciliation   celecoxib (CELEBREX) 100 MG capsule Take 100 mg by mouth 2 times daily Yes Ar Automatic Reconciliation   Cholecalciferol 100 MCG (4000 UT) CAPS Take 1 capsule by mouth daily Yes Ar Automatic Reconciliation   choline fenofibrate (TRILIPIX) 135 MG CPDR delayed release capsule Take 1 capsule by oral route every day Yes Ar Automatic Reconciliation   colesevelam (WELCHOL) 3.75 g PACK powder Take 3.75 g by mouth daily Yes Ar Automatic Reconciliation   dapagliflozin (FARXIGA) 10 MG tablet Take 1 tablet by oral route every day in the morning Yes Ar Automatic Reconciliation   EPINEPHrine (EPIPEN) 0.3 MG/0.3ML SOAJ injection Inject 0.3 mg into the muscle Yes Ar Automatic Reconciliation   ezetimibe (ZETIA) 10 MG tablet Take 1 tablet by oral route every day Yes Ar Automatic Reconciliation   fexofenadine (ALLEGRA) 180 MG tablet Take 1 tablet by oral route every day as needed Yes Ar Automatic Reconciliation   fluticasone (FLONASE) 50 MCG/ACT nasal spray SPRAY 2 SPRAYS INTO EACH NOSTRIL EVERY DAY Yes Ar Automatic Reconciliation   Magnesium 500 MG CAPS Take 1 capsule by mouth daily Yes Ar Automatic Reconciliation   metFORMIN (GLUCOPHAGE-XR) 500 MG extended release tablet Take 500 mg by mouth Daily with supper Yes Ar Automatic Reconciliation   montelukast (SINGULAIR) 10 MG tablet Take 1 tablet by oral route every day in the evening as needed Yes Ar Automatic Reconciliation   Niacin ER 1000 MG TBCR TAKE 1 TABLET BY MOUTH THREE TIMES A DAY Yes Ar Automatic Reconciliation   rivaroxaban (XARELTO) 20 MG TABS tablet Take 20 mg by mouth daily (with breakfast) Yes Ar Automatic Reconciliation   SITagliptin (JANUVIA) 100 MG tablet Take 1 tablet by oral route every day Yes Ar Automatic Reconciliation   solifenacin (VESICARE) 5 MG tablet TAKE 1 TABLET BY MOUTH EVERY DAY Yes Ar Automatic Reconciliation       CareTeam (Including outside providers/suppliers regularly involved in providing care):   Patient Care Team:  Miller Rea MD as PCP - Juanito Zurita MD as PCP - [facility] Empaneled Provider     Reviewed and updated this visit:  Tobacco  Allergies  Meds  Problems  Med Hx  Surg Hx  Soc Hx  Fam Hx

## 2022-07-25 ENCOUNTER — COMMUNITY OUTREACH (OUTPATIENT)
Dept: INTERNAL MEDICINE CLINIC | Facility: CLINIC | Age: 72
End: 2022-07-25

## 2022-09-13 NOTE — PROGRESS NOTES
Case Management Discharge Note      Final Note: Home    Provided Post Acute Provider List?: N/A  N/A Provider List Comment: Denies dc needs    Selected Continued Care - Discharged on 9/12/2022 Admission date: 9/10/2022 - Discharge disposition: Home or Self Care                Transportation Services  Private: Car    Final Discharge Disposition Code: 01 - home or self-care   [facility]      [facility]    Patient Name: Josephine Ortega  :  1950    Subjective:    Chief Complaint:  Chief Complaint   Patient presents with    Medicare AWV     Pt is here for Medicare Wellness and to review labs. HPI Here for Medicare Wellness visit; patient had fasting labs done recently and results were reviewed in detail today; He has lost over 30 lbs in the past year due to healthy eating, avoiding sweets and starchy foods; he plans to start being more active; He had episode of anaphylaxis in November; he woke up with tingling in his lips, face, with swelling; he became light headed, blood pressure was low; he passed out in the bathroom and his daughter called 911; he has since seen the Allergist, was prescribed Epi pens in case of recurrence;   Colonoscopy: 2019  Eye exam: 10/2021  Dental exam: has appointment next month  Pneumovax:   Prevnar 13: 2016  Shingrix:   Tdap:   Fluvax: 2021  Onel Morris 19: 3/14, 2021  Lis Lebron booster: 2022  HTN:  Patient compliant with taking blood pressure medications: Yes  Discussed importance of following low sodium DASH diet, increasing physical activity, taking medications as ordered, decreasing alcohol intake, keeping f/u visits to recheck blood pressure, monitoring blood pressure at home and keeping a log, with goal blood pressure <140/90. Home bp readings are: does not monitor  Diabetes:  Blood sugar readings: good  Patient compliant with low carbohydrate diet, with occasional dietary indiscretions. Patient is sedentary and does not exercise. Regular exercise recommended.   Patient advised on foot care and regular foot exam.  Last eye exam: 10/2021  Last HgbA1c:   Lab Results   Component Value Date    LABA1C 5.7 (H) 2022     Lab Results   Component Value Date     2022        Past Medical History:   Diagnosis Date    Allergic sinusitis     Cholelithiasis     CKD (chronic kidney disease) pt denies; CMP WNL (2019, 2019)    Degenerative disc disease, cervical     Elevated PSA     GERD (gastroesophageal reflux disease)     managed with medication    History of prostate cancer     Surgery, chemo, radiation     Hx of malignant neoplasm of soft tissue     Hx of syncope     Hyperlipidemia     Hypertension     Migraine     \"haven't had in years\"    Multiple nevi     Obesity, morbid ([address]     bmi- 43    Personal history of colonic polyps     adenomas    PUD (peptic ulcer disease)     resolved     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 ; 10/2018    right leg; family hx of factor 5 leiden- pt on xarelto and ASA    Ulnar nerve compression        Past Surgical History:   Procedure Laterality Date    CHOLECYSTECTOMY, LAPAROSCOPIC      COLONOSCOPY  3/24/15; most recent 3/2019    Goyo--two asc TA, three desc hyperplastic, one sigmoid TA--3 year recall    LIPOMA RESECTION  2019    [facility]    Relocate nerve in right elbow    PROSTATECTOMY      not removed, radiation seeds       Family History   Problem Relation Age of Onset    Cancer Father         lung    Heart Attack Mother     Heart Disease Mother     Cancer Paternal Grandmother     Cancer Paternal Grandfather     Lung Disease Paternal Grandfather        Social History     Tobacco Use    Smoking status: Former Smoker     Packs/day: 1.00     Quit date: 1984     Years since quittin.0    Smokeless tobacco: Never Used   Substance Use Topics    Alcohol use: Yes    Drug use:  No                 Current Outpatient Medications:     Continuous Blood Gluc Sensor (FREESTYLE TORRI 14 DAY SENSOR) MISC, CHECK BLOOD GLUCOSE 3 TIMES A DAY, Disp: , Rfl:     aspirin 325 MG tablet, Take 325 mg by mouth daily, Disp: , Rfl:     atorvastatin (LIPITOR) 40 MG tablet, Take 1 tablet by oral route every day, Disp: , Rfl:     celecoxib (CELEBREX) 100 MG capsule, Take 100 mg by mouth 2 times daily, Disp: , Rfl:     Cholecalciferol 100 MCG (4000 UT) CAPS, Take 1 capsule by mouth daily, Disp: , Rfl:     choline fenofibrate (TRILIPIX) 135 MG CPDR delayed release capsule, Take 1 capsule by oral route every day, Disp: , Rfl:     colesevelam (WELCHOL) 3.75 g PACK powder, Take 3.75 g by mouth daily, Disp: , Rfl:     dapagliflozin (FARXIGA) 10 MG tablet, Take 1 tablet by oral route every day in the morning, Disp: , Rfl:     EPINEPHrine (EPIPEN) 0.3 MG/0.3ML SOAJ injection, Inject 0.3 mg into the muscle, Disp: , Rfl:     ezetimibe (ZETIA) 10 MG tablet, Take 1 tablet by oral route every day, Disp: , Rfl:     fexofenadine (ALLEGRA) 180 MG tablet, Take 1 tablet by oral route every day as needed, Disp: , Rfl:     fluticasone (FLONASE) 50 MCG/ACT nasal spray, SPRAY 2 SPRAYS INTO EACH NOSTRIL EVERY DAY, Disp: , Rfl:     Magnesium 500 MG CAPS, Take 1 capsule by mouth daily, Disp: , Rfl:     metFORMIN (GLUCOPHAGE-XR) 500 MG extended release tablet, Take 500 mg by mouth Daily with supper, Disp: , Rfl:     montelukast (SINGULAIR) 10 MG tablet, Take 1 tablet by oral route every day in the evening as needed, Disp: , Rfl:     Niacin ER 1000 MG TBCR, TAKE 1 TABLET BY MOUTH THREE TIMES A DAY, Disp: , Rfl:     rivaroxaban (XARELTO) 20 MG TABS tablet, Take 20 mg by mouth daily (with breakfast), Disp: , Rfl:     SITagliptin (JANUVIA) 100 MG tablet, Take 1 tablet by oral route every day, Disp: , Rfl:     solifenacin (VESICARE) 5 MG tablet, TAKE 1 TABLET BY MOUTH EVERY DAY, Disp: , Rfl:     No Known Allergies    Review of Systems   Constitutional: Negative for chills, fatigue and fever. HENT: Negative for congestion, postnasal drip and rhinorrhea. Eyes: Negative for visual disturbance. Respiratory: Negative for cough and shortness of breath. Cardiovascular: Negative for chest pain and palpitations.    Gastrointestinal: Negative for abdominal pain and blood in stool. Genitourinary: Negative for dysuria and frequency. Musculoskeletal: Negative for arthralgias and myalgias. Skin: Negative. Neurological: Negative for numbness and headaches. Psychiatric/Behavioral: Negative for dysphoric mood and sleep disturbance. The patient is not nervous/anxious. All other systems reviewed and are negative. Objective:  BP (!) 141/75   Pulse 70   Temp 98.3 °F (36.8 °C) (Temporal)   Resp 16   Ht 6' (1.829 m)   Wt 273 lb (123.8 kg)   SpO2 98%   BMI 37.03 kg/m²     Examination:  Physical Exam  Vitals reviewed. Constitutional:       Appearance: Normal appearance. He is obese. HENT:      Head: Normocephalic and atraumatic. Nose: Nose normal.      Mouth/Throat:      Mouth: Mucous membranes are moist.      Pharynx: Oropharynx is clear. Eyes:      Extraocular Movements: Extraocular movements intact. Conjunctiva/sclera: Conjunctivae normal.      Pupils: Pupils are equal, round, and reactive to light. Cardiovascular:      Rate and Rhythm: Normal rate and regular rhythm. Pulses: Normal pulses. Heart sounds: Normal heart sounds. Pulmonary:      Effort: Pulmonary effort is normal.      Breath sounds: Normal breath sounds. Abdominal:      General: Abdomen is flat. Bowel sounds are normal.      Palpations: Abdomen is soft. Musculoskeletal:         General: Normal range of motion. Cervical back: Normal range of motion and neck supple. Skin:     General: Skin is warm and dry. Neurological:      General: No focal deficit present. Mental Status: He is alert and oriented to person, place, and time. Mental status is at baseline. Psychiatric:         Mood and Affect: Mood normal.         Behavior: Behavior normal.           Assessment/Plan:    Preethi Downs was seen today for medicare aw.     Diagnoses and all orders for this visit:    Medicare annual wellness visit, subsequent  Annual Exam: Goals for good health were addressed at today's visit:  -Reach and stay at a healthy weight. This can lower your risk of obesity, diabetes, heart disease and high blood pressure. -Get at least 30 minutes of physical activity daily. Walking, running, swimming, cycling or playing sports are good options.  -Do not smoke or allow others to smoke around you. -Use birth control if you do not want to have children at this time and barrier contraception to prevent or decrease risk of exposure to sexually transmitted diseases. -Use sunscreen daily, SPF of 15 or higher are best.  -See a dentist at least once yearly for checkups. -Have vision checked every 1-2 years.  -Wear a seat belt in the car.  -Avoid drinking in excess of 2 alcoholic drinks per day for men and 1 drink a day for women, or avoid drinking altogether.   -Watch closely for changes in our health and contact your doctor with any concerning problems or symptoms  -Age appropriate screening tests were updated and discussed at today's visit. -Immunization history was reviewed and updated at today's visit. Mixed hyperlipidemia  Stable, continue medication, diet. -     Comprehensive Metabolic Panel; Future  -     Lipid Panel; Future  -     Urinalysis W/ Rflx Microscopic; Future    Obesity, morbid ([address]  Recommended low fat, low cholesterol, low carbohydrate diet. Recommended increasing fresh fruits and vegetables in diet, eating lean meats, like fish and poultry, that are baked, broiled or grilled, not fried. Recommended regular exercise, 30 minutes of aerobic activity 4-5 days a week. Recommended monitoring weight closely and keeping follow-up appointments for recheck. Factor 5 Leiden mutation, heterozygous ([address]  Stable. Controlled type 2 diabetes mellitus without complication, without long-term current use of insulin (HCC)  Stable, continue medication, diet. -     Hemoglobin A1c with eAG;  Future    Gastroesophageal reflux disease with esophagitis without hemorrhage  Stable, continue medication, diet. -     CBC with Auto Differential; Future    Seasonal allergic rhinitis due to pollen  Stable. Primary hypertension  Stable, continue medication, diet. Vitamin D insufficiency  -     Vitamin D 25 Hydroxy; Future          Follow-up and Dispositions    · Return in 6 months (on 11/24/2022), or if symptoms worsen or fail to improve, for Medicare Annual Wellness Visit in 1 year, f/u w/ labs. Medication Reconciliation:  Current Medications Verified: Current medications/ immunizations were reviewed, including purpose, with patient. Family History, Social History, Current and Past Medical History was reviewed with patient and updated at today's office visit. Medication Reconciliation list was given to patient/ family. Patient was advised to discard old medication lists and provide all providers with current list at each visit and carry list with them in case of emergency. Completed By:   Moises Chadwick MD    [facility], [address] [place] [address],[address] [place] [address] Rd  [phone] fax  1:21 PM   Medicare Annual Wellness Visit    [place] is here for [place] AWV     Assessment & Plan    See above. Return in 6 months (on 11/24/2022), or if symptoms worsen or fail to improve, for Medicare Annual Wellness Visit in 1 year, f/u w/ labs. Subjective   The following acute and/or chronic problems were also addressed today:  As above. Patient's complete Health Risk Assessment and screening values have been reviewed and are found in Flowsheets. The following problems were reviewed today and where indicated follow up appointments were made and/or referrals ordered.     Positive Risk Factor Screenings with Interventions:               General Health and ACP:  General  In general, how would you say your health is?: Good  In the past 7 days, have you experienced any of the following: New or Increased Pain, New or Increased Fatigue, Loneliness, Social Isolation, Stress or Anger?: No  Do you get the social and emotional support that you need?: Yes  Do you have a Living Will?: (!) No    Advance Directives     Power of Sae Sandhu Will ACP-Advance Directive ACP-Power of     Not on File Not on File Not on File Not on File      General Health Risk Interventions:  · will discuss at today's visit    Health Habits/Nutrition:     Physical Activity: Inactive    Days of Exercise per Week: 0 days    Minutes of Exercise per Session: 0 min     Have you lost any weight without trying in the past 3 months?: No  Body mass index: (!) 37.02  Have you seen the dentist within the past year?: Yes    Health Habits/Nutrition Interventions:  · Inadequate physical activity:  patient agrees to exercise for at least 150 minutes/week     Safety:  Do you have working smoke detectors?: Yes  Do you have any tripping hazards - loose or unsecured carpets or rugs?: No  Do you have any tripping hazards - clutter in doorways, halls, or stairs?: No  Do you have either shower bars, grab bars, non-slip mats or non-slip surfaces in your shower or bathtub?: (!) No  Do all of your stairways have a railing or banister?: Yes  Do you always fasten your seatbelt when you are in a car?: Yes    Safety Interventions:  · Home safety tips provided           Objective   Vitals:    05/24/22 1143   BP: (!) 141/75   Pulse: 70   Resp: 16   Temp: 98.3 °F (36.8 °C)   TempSrc: Temporal   SpO2: 98%   Weight: 273 lb (123.8 kg)   Height: 6' (1.829 m)      Body mass index is 37.03 kg/m².         General Appearance: alert and oriented to person, place and time, well developed and well- nourished, in no acute distress  Skin: warm and dry, no rash or erythema  Head: normocephalic and atraumatic  Eyes: pupils equal, round, and reactive to light, extraocular eye movements intact, conjunctivae normal  ENT: tympanic membrane, external ear and ear canal normal bilaterally, nose without deformity, nasal mucosa and turbinates normal without polyps  Neck: supple and non-tender without mass, no thyromegaly or thyroid nodules, no cervical lymphadenopathy  Pulmonary/Chest: clear to auscultation bilaterally- no wheezes, rales or rhonchi, normal air movement, no respiratory distress  Cardiovascular: normal rate, regular rhythm, normal S1 and S2, no murmurs, rubs, clicks, or gallops, distal pulses intact, no carotid bruits  Abdomen: soft, non-tender, non-distended, normal bowel sounds, no masses or organomegaly  Extremities: no cyanosis, clubbing or edema  Musculoskeletal: normal range of motion, no joint swelling, deformity or tenderness  Neurologic: reflexes normal and symmetric, no cranial nerve deficit, gait, coordination and speech normal       No Known Allergies  Prior to Visit Medications    Medication Sig Taking?  Authorizing Provider   Continuous Blood Gluc Sensor (AudiodraftYLE TORRI 14 DAY SENSOR) [facility] CHECK BLOOD GLUCOSE 3 TIMES A DAY Yes Historical Provider, MD   aspirin 325 MG tablet Take 325 mg by mouth daily Yes Ar Automatic Reconciliation   atorvastatin (LIPITOR) 40 MG tablet Take 1 tablet by oral route every day Yes Ar Automatic Reconciliation   celecoxib (CELEBREX) 100 MG capsule Take 100 mg by mouth 2 times daily Yes Ar Automatic Reconciliation   Cholecalciferol 100 MCG (4000 UT) CAPS Take 1 capsule by mouth daily Yes Ar Automatic Reconciliation   choline fenofibrate (TRILIPIX) 135 MG CPDR delayed release capsule Take 1 capsule by oral route every day Yes Ar Automatic Reconciliation   colesevelam (WELCHOL) 3.75 g PACK powder Take 3.75 g by mouth daily Yes Ar Automatic Reconciliation   dapagliflozin (FARXIGA) 10 MG tablet Take 1 tablet by oral route every day in the morning Yes Ar Automatic Reconciliation   EPINEPHrine (EPIPEN) 0.3 MG/0.3ML SOAJ injection Inject 0.3 mg into the muscle Yes Ar Automatic Reconciliation   ezetimibe (ZETIA) 10 MG tablet Take 1 tablet by oral route

## 2022-11-22 DIAGNOSIS — K21.00 GASTROESOPHAGEAL REFLUX DISEASE WITH ESOPHAGITIS WITHOUT HEMORRHAGE: ICD-10-CM

## 2022-11-22 DIAGNOSIS — E55.9 VITAMIN D INSUFFICIENCY: ICD-10-CM

## 2022-11-22 DIAGNOSIS — E78.2 MIXED HYPERLIPIDEMIA: ICD-10-CM

## 2022-11-22 DIAGNOSIS — E11.9 CONTROLLED TYPE 2 DIABETES MELLITUS WITHOUT COMPLICATION, WITHOUT LONG-TERM CURRENT USE OF INSULIN (HCC): ICD-10-CM

## 2022-11-22 LAB
25(OH)D3 SERPL-MCNC: 37 NG/ML (ref 30–100)
ALBUMIN SERPL-MCNC: 3.6 G/DL (ref 3.2–4.6)
ALBUMIN/GLOB SERPL: 1.4 {RATIO} (ref 0.4–1.6)
ALP SERPL-CCNC: 44 U/L (ref 50–136)
ALT SERPL-CCNC: 23 U/L (ref 12–65)
ANION GAP SERPL CALC-SCNC: 1 MMOL/L (ref 2–11)
APPEARANCE UR: CLEAR
AST SERPL-CCNC: 11 U/L (ref 15–37)
BASOPHILS # BLD: 0.1 K/UL (ref 0–0.2)
BASOPHILS NFR BLD: 1 % (ref 0–2)
BILIRUB SERPL-MCNC: 0.4 MG/DL (ref 0.2–1.1)
BILIRUB UR QL: NEGATIVE
BUN SERPL-MCNC: 15 MG/DL (ref 8–23)
CALCIUM SERPL-MCNC: 9.5 MG/DL (ref 8.3–10.4)
CHLORIDE SERPL-SCNC: 109 MMOL/L (ref 101–110)
CHOLEST SERPL-MCNC: 147 MG/DL
CO2 SERPL-SCNC: 31 MMOL/L (ref 21–32)
COLOR UR: ABNORMAL
CREAT SERPL-MCNC: 0.8 MG/DL (ref 0.8–1.5)
DIFFERENTIAL METHOD BLD: NORMAL
EOSINOPHIL # BLD: 0.1 K/UL (ref 0–0.8)
EOSINOPHIL NFR BLD: 2 % (ref 0.5–7.8)
ERYTHROCYTE [DISTWIDTH] IN BLOOD BY AUTOMATED COUNT: 13.6 % (ref 11.9–14.6)
EST. AVERAGE GLUCOSE BLD GHB EST-MCNC: 123 MG/DL
GLOBULIN SER CALC-MCNC: 2.6 G/DL (ref 2.8–4.5)
GLUCOSE SERPL-MCNC: 155 MG/DL (ref 65–100)
GLUCOSE UR STRIP.AUTO-MCNC: 250 MG/DL
HBA1C MFR BLD: 5.9 % (ref 4.8–5.6)
HCT VFR BLD AUTO: 49.4 % (ref 41.1–50.3)
HDLC SERPL-MCNC: 74 MG/DL (ref 40–60)
HDLC SERPL: 2 {RATIO}
HGB BLD-MCNC: 16.4 G/DL (ref 13.6–17.2)
HGB UR QL STRIP: NEGATIVE
IMM GRANULOCYTES # BLD AUTO: 0 K/UL (ref 0–0.5)
IMM GRANULOCYTES NFR BLD AUTO: 0 % (ref 0–5)
KETONES UR QL STRIP.AUTO: NEGATIVE MG/DL
LDLC SERPL CALC-MCNC: 56.4 MG/DL
LEUKOCYTE ESTERASE UR QL STRIP.AUTO: NEGATIVE
LYMPHOCYTES # BLD: 1.7 K/UL (ref 0.5–4.6)
LYMPHOCYTES NFR BLD: 31 % (ref 13–44)
MCH RBC QN AUTO: 30.7 PG (ref 26.1–32.9)
MCHC RBC AUTO-ENTMCNC: 33.2 G/DL (ref 31.4–35)
MCV RBC AUTO: 92.5 FL (ref 82–102)
MONOCYTES # BLD: 0.5 K/UL (ref 0.1–1.3)
MONOCYTES NFR BLD: 8 % (ref 4–12)
NEUTS SEG # BLD: 3.1 K/UL (ref 1.7–8.2)
NEUTS SEG NFR BLD: 58 % (ref 43–78)
NITRITE UR QL STRIP.AUTO: NEGATIVE
NRBC # BLD: 0 K/UL (ref 0–0.2)
PH UR STRIP: 5 [PH] (ref 5–9)
PLATELET # BLD AUTO: 151 K/UL (ref 150–450)
PMV BLD AUTO: 10.9 FL (ref 9.4–12.3)
POTASSIUM SERPL-SCNC: 4.8 MMOL/L (ref 3.5–5.1)
PROT SERPL-MCNC: 6.2 G/DL (ref 6.3–8.2)
PROT UR STRIP-MCNC: NEGATIVE MG/DL
RBC # BLD AUTO: 5.34 M/UL (ref 4.23–5.6)
SODIUM SERPL-SCNC: 141 MMOL/L (ref 133–143)
SP GR UR REFRACTOMETRY: 1.02 (ref 1–1.02)
TRIGL SERPL-MCNC: 83 MG/DL (ref 35–150)
UROBILINOGEN UR QL STRIP.AUTO: 0.2 EU/DL (ref 0.2–1)
VLDLC SERPL CALC-MCNC: 16.6 MG/DL (ref 6–23)
WBC # BLD AUTO: 5.4 K/UL (ref 4.3–11.1)

## 2022-11-29 ENCOUNTER — OFFICE VISIT (OUTPATIENT)
Dept: INTERNAL MEDICINE CLINIC | Facility: CLINIC | Age: 72
End: 2022-11-29
Payer: MEDICARE

## 2022-11-29 VITALS
HEIGHT: 72 IN | SYSTOLIC BLOOD PRESSURE: 138 MMHG | WEIGHT: 283 LBS | BODY MASS INDEX: 38.33 KG/M2 | RESPIRATION RATE: 16 BRPM | OXYGEN SATURATION: 99 % | HEART RATE: 78 BPM | TEMPERATURE: 98 F | DIASTOLIC BLOOD PRESSURE: 88 MMHG

## 2022-11-29 DIAGNOSIS — E11.9 CONTROLLED TYPE 2 DIABETES MELLITUS WITHOUT COMPLICATION, WITHOUT LONG-TERM CURRENT USE OF INSULIN (HCC): ICD-10-CM

## 2022-11-29 DIAGNOSIS — M50.30 DEGENERATIVE DISC DISEASE, CERVICAL: ICD-10-CM

## 2022-11-29 DIAGNOSIS — K21.00 GASTROESOPHAGEAL REFLUX DISEASE WITH ESOPHAGITIS WITHOUT HEMORRHAGE: ICD-10-CM

## 2022-11-29 DIAGNOSIS — D68.51 FACTOR 5 LEIDEN MUTATION, HETEROZYGOUS (HCC): ICD-10-CM

## 2022-11-29 DIAGNOSIS — Z23 NEED FOR INFLUENZA VACCINATION: ICD-10-CM

## 2022-11-29 DIAGNOSIS — T78.2XXS ANAPHYLAXIS, SEQUELA: ICD-10-CM

## 2022-11-29 DIAGNOSIS — E78.2 MIXED HYPERLIPIDEMIA: ICD-10-CM

## 2022-11-29 DIAGNOSIS — I10 PRIMARY HYPERTENSION: Primary | ICD-10-CM

## 2022-11-29 DIAGNOSIS — E66.01 OBESITY, MORBID (HCC): ICD-10-CM

## 2022-11-29 DIAGNOSIS — Z85.46 HISTORY OF PROSTATE CANCER: ICD-10-CM

## 2022-11-29 PROCEDURE — G8484 FLU IMMUNIZE NO ADMIN: HCPCS | Performed by: INTERNAL MEDICINE

## 2022-11-29 PROCEDURE — G0008 ADMIN INFLUENZA VIRUS VAC: HCPCS | Performed by: INTERNAL MEDICINE

## 2022-11-29 PROCEDURE — 1123F ACP DISCUSS/DSCN MKR DOCD: CPT | Performed by: INTERNAL MEDICINE

## 2022-11-29 PROCEDURE — 3017F COLORECTAL CA SCREEN DOC REV: CPT | Performed by: INTERNAL MEDICINE

## 2022-11-29 PROCEDURE — 3044F HG A1C LEVEL LT 7.0%: CPT | Performed by: INTERNAL MEDICINE

## 2022-11-29 PROCEDURE — 90694 VACC AIIV4 NO PRSRV 0.5ML IM: CPT | Performed by: INTERNAL MEDICINE

## 2022-11-29 PROCEDURE — G8427 DOCREV CUR MEDS BY ELIG CLIN: HCPCS | Performed by: INTERNAL MEDICINE

## 2022-11-29 PROCEDURE — 2022F DILAT RTA XM EVC RTNOPTHY: CPT | Performed by: INTERNAL MEDICINE

## 2022-11-29 PROCEDURE — 3075F SYST BP GE 130 - 139MM HG: CPT | Performed by: INTERNAL MEDICINE

## 2022-11-29 PROCEDURE — G8417 CALC BMI ABV UP PARAM F/U: HCPCS | Performed by: INTERNAL MEDICINE

## 2022-11-29 PROCEDURE — 99214 OFFICE O/P EST MOD 30 MIN: CPT | Performed by: INTERNAL MEDICINE

## 2022-11-29 PROCEDURE — 3078F DIAST BP <80 MM HG: CPT | Performed by: INTERNAL MEDICINE

## 2022-11-29 PROCEDURE — 1036F TOBACCO NON-USER: CPT | Performed by: INTERNAL MEDICINE

## 2022-11-29 RX ORDER — EPINEPHRINE 0.3 MG/.3ML
INJECTION SUBCUTANEOUS
Qty: 2 EACH | Refills: 1 | Status: SHIPPED | OUTPATIENT
Start: 2022-11-29

## 2022-11-29 ASSESSMENT — PATIENT HEALTH QUESTIONNAIRE - PHQ9
SUM OF ALL RESPONSES TO PHQ QUESTIONS 1-9: 0
SUM OF ALL RESPONSES TO PHQ QUESTIONS 1-9: 0
2. FEELING DOWN, DEPRESSED OR HOPELESS: 0
1. LITTLE INTEREST OR PLEASURE IN DOING THINGS: 0
SUM OF ALL RESPONSES TO PHQ9 QUESTIONS 1 & 2: 0
SUM OF ALL RESPONSES TO PHQ QUESTIONS 1-9: 0
SUM OF ALL RESPONSES TO PHQ QUESTIONS 1-9: 0

## 2022-11-29 ASSESSMENT — ENCOUNTER SYMPTOMS
RHINORRHEA: 0
COUGH: 0
BLOOD IN STOOL: 0
SHORTNESS OF BREATH: 0
ABDOMINAL PAIN: 0

## 2022-11-29 NOTE — PROGRESS NOTES
[facility]      2022    Patient Name: Christian Blackmon  :  1950    Subjective:    Chief Complaint:  Chief Complaint   Patient presents with    Hypertension     Pt is here for 6 month follow up to review labs. HPI Here for f/u visit; patient had fasting labs done recently and results were reviewed in detail today; He has history of prostate cancer, sarcoma, hx of DVT, Factor V Leiden heterozygous, saw Dr. Efrem Becerril in September; he is to continue Xarelto as prescribed, f/u in 1 year; records reviewed; He will try to do better with his diet and start back exercising;   HTN:  Patient compliant with taking blood pressure medications: Yes  Discussed importance of following low sodium DASH diet, increasing physical activity, taking medications as ordered, decreasing alcohol intake, keeping f/u visits to recheck blood pressure, monitoring blood pressure at home and keeping a log, with goal blood pressure <140/90. Home bp readings are: 135-150/80-90  Diabetes:  Blood sugar readings: 130-145  Patient compliant with low carbohydrate diet, with occasional dietary indiscretions. Patient is sedentary and does not exercise. Regular exercise recommended.   Patient advised on foot care and regular foot exam.  Last eye exam:  Last HgbA1c:   Lab Results   Component Value Date    LABA1C 5.9 (H) 2022     Lab Results   Component Value Date     2022            Past Medical History:   Diagnosis Date    Allergic sinusitis     Cholelithiasis     CKD (chronic kidney disease)     pt denies; CMP WNL (2019, 2019)    Degenerative disc disease, cervical     Elevated PSA     GERD (gastroesophageal reflux disease)     managed with medication    History of prostate cancer     Surgery, chemo, radiation     Hx of malignant neoplasm of soft tissue     Hx of syncope     Hyperlipidemia     Hypertension     Migraine     \"haven't had in years\"    Multiple nevi     Obesity, morbid ([facility] [address] bmi- 43    Personal history of colonic polyps 2015    adenomas    PUD (peptic ulcer disease)     resolved     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    Thromboembolus ([facility] [address] ; 10/2018    right leg; family hx of factor 5 leiden- pt on xarelto and ASA    Ulnar nerve compression        Past Surgical History:   Procedure Laterality Date    CHOLECYSTECTOMY, LAPAROSCOPIC      COLONOSCOPY  3/24/15; most recent 3/2019    Goyo--two asc TA, three desc hyperplastic, one sigmoid TA--3 year recall    LIPOMA RESECTION  2019    MALIGNANT SKIN LESION EXCISION      [facility] [address]    Relocate nerve in right elbow    PROSTATECTOMY      not removed, radiation seeds       Family History   Problem Relation Age of Onset    Cancer Father         lung    Heart Attack Mother     Heart Disease Mother     Cancer Paternal Grandmother     Cancer Paternal Grandfather     Lung Disease Paternal Grandfather        Social History     Tobacco Use    Smoking status: Former     Packs/day: 1.00     Types: Cigarettes     Quit date: 1984     Years since quittin.6    Smokeless tobacco: Never   Substance Use Topics    Alcohol use: Not Currently    Drug use:  No                 Current Outpatient Medications:     EPINEPHrine (EPIPEN 2-EDIN) 0.3 MG/0.3ML SOAJ injection, Use as directed for allergic reaction, Disp: 2 each, Rfl: 1    Continuous Blood Gluc Sensor (FREESTYLE TORRI 14 DAY SENSOR) [facility], CHECK BLOOD GLUCOSE 3 TIMES A DAY, Disp: , Rfl:     aspirin 325 MG tablet, Take 325 mg by mouth daily, Disp: , Rfl:     atorvastatin (LIPITOR) 40 MG tablet, Take 1 tablet by oral route every day, Disp: , Rfl:     celecoxib (CELEBREX) 100 MG capsule, Take 100 mg by mouth 2 times daily, Disp: , Rfl:     Cholecalciferol 100 MCG (4000 UT) CAPS, Take 1 capsule by mouth daily, Disp: , Rfl:     choline fenofibrate (TRILIPIX) 135 MG CPDR delayed release capsule, Take 1 capsule by oral route every day, Disp: , Rfl: dapagliflozin (FARXIGA) 10 MG tablet, Take 1 tablet by oral route every day in the morning, Disp: , Rfl:     EPINEPHrine (EPIPEN) 0.3 MG/0.3ML SOAJ injection, Inject 0.3 mg into the muscle, Disp: , Rfl:     ezetimibe (ZETIA) 10 MG tablet, Take 1 tablet by oral route every day, Disp: , Rfl:     fluticasone (FLONASE) 50 MCG/ACT nasal spray, SPRAY 2 SPRAYS INTO EACH NOSTRIL EVERY DAY, Disp: , Rfl:     Magnesium 500 MG CAPS, Take 1 capsule by mouth daily, Disp: , Rfl:     metFORMIN (GLUCOPHAGE-XR) 500 MG extended release tablet, Take 500 mg by mouth Daily with supper, Disp: , Rfl:     montelukast (SINGULAIR) 10 MG tablet, Take 1 tablet by oral route every day in the evening as needed, Disp: , Rfl:     rivaroxaban (XARELTO) 20 MG TABS tablet, Take 20 mg by mouth daily (with breakfast), Disp: , Rfl:     SITagliptin (JANUVIA) 100 MG tablet, Take 1 tablet by oral route every day, Disp: , Rfl:     No Known Allergies    Review of Systems   Constitutional:  Negative for chills, fatigue and fever. HENT:  Negative for congestion, postnasal drip and rhinorrhea. Eyes:  Negative for visual disturbance. Respiratory:  Negative for cough and shortness of breath. Cardiovascular:  Negative for chest pain and palpitations. Gastrointestinal:  Negative for abdominal pain and blood in stool. Genitourinary:  Negative for dysuria and frequency. Musculoskeletal:  Negative for arthralgias and myalgias. Skin: Negative. Neurological:  Negative for numbness and headaches. Psychiatric/Behavioral:  Negative for dysphoric mood and sleep disturbance. The patient is not nervous/anxious. All other systems reviewed and are negative. Objective:  /88   Pulse 78   Temp 98 °F (36.7 °C) (Temporal)   Resp 16   Ht 6' (1.829 m)   Wt 283 lb (128.4 kg)   SpO2 99%   BMI 38.38 kg/m²     Examination:  Physical Exam  Vitals reviewed. Constitutional:       Appearance: Normal appearance. He is obese.    HENT:      Head: Normocephalic and atraumatic. Nose: Nose normal.      Mouth/Throat:      Mouth: Mucous membranes are moist.      Pharynx: Oropharynx is clear. Eyes:      Extraocular Movements: Extraocular movements intact. Conjunctiva/sclera: Conjunctivae normal.      Pupils: Pupils are equal, round, and reactive to light. Cardiovascular:      Rate and Rhythm: Normal rate and regular rhythm. Pulses: Normal pulses. Heart sounds: Normal heart sounds. Pulmonary:      Effort: Pulmonary effort is normal.      Breath sounds: Normal breath sounds. Abdominal:      General: Abdomen is flat. Bowel sounds are normal.      Palpations: Abdomen is soft. Musculoskeletal:         General: Normal range of motion. Cervical back: Normal range of motion and neck supple. Skin:     General: Skin is warm and dry. Neurological:      General: No focal deficit present. Mental Status: He is alert and oriented to person, place, and time. Mental status is at baseline. Psychiatric:         Mood and Affect: Mood normal.         Behavior: Behavior normal.         Assessment/Plan:    Ashok Daniels was seen today for hypertension. Diagnoses and all orders for this visit:    Primary hypertension  Recommended low sodium diet; Goal: take meds as prescribed, monitor blood pressure at home and call with readings. -     Urinalysis; Future    Need for influenza vaccination  -     Influenza, FLUAD, (age 72 y+), IM, Preservative Free, 0.5 mL    Gastroesophageal reflux disease with esophagitis without hemorrhage  Stable, continue medication, diet. -     CBC with Auto Differential; Future    Controlled type 2 diabetes mellitus without complication, without long-term current use of insulin (HCC)  Stable, continue medication, diet. -     Hemoglobin A1C; Future  -     Microalbumin / Creatinine Urine Ratio; Future    Degenerative disc disease, cervical  Stable.     Factor 5 Leiden mutation, heterozygous ([facility] [address]  Stable on present medications, continue as prescribed. Obesity, morbid ([address]  Recommended low fat, low cholesterol, low carbohydrate diet. Recommended increasing fresh fruits and vegetables in diet, eating lean meats, like fish and poultry, that are baked, broiled or grilled, not fried. Recommended regular exercise, 30 minutes of aerobic activity 4-5 days a week. Recommended monitoring weight closely and keeping follow-up appointments for recheck. Mixed hyperlipidemia  Stable, continue medication, diet. -     Comprehensive Metabolic Panel; Future  -     Lipid Panel; Future  -     T4, Free; Future  -     TSH; Future    History of prostate cancer  -     PSA, Total and Free; Future    Anaphylaxis, sequela  -     EPINEPHrine (EPIPEN 2-EDIN) 0.3 MG/0.3ML SOAJ injection; Use as directed for allergic reaction        Follow-up and Dispositions    Return in about 6 months (around 5/29/2023), or if symptoms worsen or fail to improve, for medicare annual w/ labs. Medication Reconciliation:  Current Medications Verified: Current medications/ immunizations were reviewed, including purpose, with patient. Family History, Social History, Current and Past Medical History was reviewed with patient and updated at today's office visit. Medication Reconciliation list was given to patient/ family. Patient was advised to discard old medication lists and provide all providers with current list at each visit and carry list with them in case of emergency.     Completed By:   Pattie Mendoza MD    [facility]  [address] [place] [address] 2050, [address], [address] [facility]  [phone]  [phone] fax  11:40 AM

## 2023-02-02 ENCOUNTER — PATIENT MESSAGE (OUTPATIENT)
Dept: INTERNAL MEDICINE CLINIC | Facility: CLINIC | Age: 73
End: 2023-02-02

## 2023-02-02 DIAGNOSIS — E11.9 CONTROLLED TYPE 2 DIABETES MELLITUS WITHOUT COMPLICATION, WITHOUT LONG-TERM CURRENT USE OF INSULIN (HCC): Primary | ICD-10-CM

## 2023-02-02 DIAGNOSIS — E11.9 TYPE 2 DIABETES MELLITUS WITHOUT COMPLICATIONS (HCC): ICD-10-CM

## 2023-02-02 RX ORDER — FLASH GLUCOSE SENSOR
KIT MISCELLANEOUS
Refills: 5 | OUTPATIENT
Start: 2023-02-02

## 2023-02-02 RX ORDER — FLASH GLUCOSE SENSOR
KIT MISCELLANEOUS
Qty: 12 EACH | Refills: 3 | Status: SHIPPED | OUTPATIENT
Start: 2023-02-02

## 2023-02-02 NOTE — TELEPHONE ENCOUNTER
From: Mellisa Cope  To: Dr. Donnelly Mariel: 2023 11:52 AM EST  Subject: Freestyle Anthony Glucose Sensor    Ayolo Dr Jordyn Nesbitt,    I tried to get my glucose sensors this morning, but my prescription . Can you renew this prescription? I use the [facility]. Thanks for your help.     Manjinder Boothe

## 2023-03-17 NOTE — WOUND CARE
Student's Name:   Adia Coles Birth Date  2016  Sex  female  Race/Ethnicity   School/Grade Level/ID#     Address:   [address]  [facility] [place] [facility] [address]  Parent/Guardian             Telephone#                                            Home:                               Work:   IMMUNIZATIONS: To be completed by health care provider. The mo/da/yr for every dose administered is required. If a specific vaccine is medically contraindicated, a separate written statement must be attached by the health care responsible for completing the health examination explaining the medical reason for the contraindication.      Immunization History   Administered Date(s) Administered   • DTaP(INFANRIX) 10/20/2017   • DTaP/HIB/IPV 2016, 2016, 2016   • DTaP/IPV 07/07/2022   • HIB Hep B 10/20/2017   • Hep A, Pediatric/Adolescent, 3 dose 12/14/2017   • Hep A, ped/adol, 2 dose 04/11/2017, 11/04/2017   • Hep B, adult 2016, 2016, 2016, 2016   • Influenza, injectable, quadrivalent, preservative free, pediatric 2016   • Influenza, quadrivalent, preserve-free 04/03/2018, 10/20/2018, 10/12/2022   • MMR 2016, 10/02/2017   • Measles Mumps Rubella Varicella 10/02/2017, 07/07/2022   • Pneumococcal Conjugate 13 Valent Vacc (Prevnar 13) 2016, 2016, 2016, 10/20/2017   • Rotavirus - monovalent 2016, 2016   • Typhoid, Unspecified Formulation 09/11/2019      Immunizations given 3/17/2023: None      Health care provider (MD, DO, APN, PA, school health professional, health official) verifying above immunization history must sign below. If adding dates to the above immunization history section, put your initials by date(s) and sign here.)  Signature                                                                 Title                                                 [address] [place] [address] [address] Phone: [phone] Fax: [phone] Patient: Tina Flores MRN: [id number]  SSN: [id number] YOB: 1950  Age: 71 y.o. Sex: male Return Appointment: 2 weeks with Fanny Recio MD 
 
Instructions: Upper and Lower back Cleanse wound and periwound with wound cleanser or normal saline. Iodosorb- apply thin layer to wound bed, cover with cover dressing, change daily Secure with gauze and Covrsite. Change daily. Refer to Dr Yong Sharma at [facility] for removal of sebaceous cyst on lower back. Follow up in wound center in 2 weeks. Should you experience increased redness, swelling, pain, foul odor, size of wound(s), or have a temperature over 101 degrees please contact the [address] [place] [address] Road at [phone] or if after hours contact your primary care physician or go to the hospital emergency department. Signed By: Cynthia Antonio May 11, 2020 She is feeling ok. Reports some cramping overnight.     VSS  FHT: 140bpm, moderate variability, -accels, -decels   [place]: q1-2min    SVE: 2/50/-2, posterior, firm.   Cook balloon placed, 80ccU/80ccV    A/P:  23yo  at 39w4d, IOL    1. IOL  - s/p 2 cytotec  - cook balloon placed  - Pitocin to start    Continue current       Date  _____________________________________________________________________________________  Signature                                                                 Title                                                Date  _____________________________________________________________________________________   ALTERNATIVE PROOF OF IMMUNITY   1. Clinical diagnosis (measles, mumps, hepatitis B) is allowed when verified by physician and supported with lab confirmation.  Attach copy of lab result.    * MEASLES ([place])  MO   DA  YR          **MUMPS  MO   DA  YR             HEPATITIS B  MO   DA   YR           VARICELLA  MO DA  YR      2. History of varicella (chickenpox) disease is acceptable if verified by health care provider, school health professional or health official.  Person signing below verifies that the parent/guardian’s description of varicella disease history is indicative of past infection and is accepting such history as documentation of disease.  Date of Disease                                  Signature                                                           Title   3. Laboratory Evidence of Immunity (check one)      __ Measles*         __ Mumps**        __ Rubella        __Varicella    (Attach copy of lab result)         *All measles cases diagnosed on or after July 1, 2002, must be confirmed by laboratory evidence.      **All mumps cases diagnosed on or after July 1, 2013, must be confirmed by laboratory evidence.     Completion of Alternative 1 or 3 MUST be accompanied by Labs & Physician Signature: ___________________________  Physician Statements of Immunity MUST be submitted to [facility] for review.     Certificates of Protestant Exemption to Immunizations or Physician Medical Statements of Medical Contraindication Are Reviewed   and Maintained by the School Authority     (11/2015)                                         (COMPLETE BOTH SIDES)                                  Approved [facility]  3/2016    HEALTH HISTORY      TO BE COMPLETED AND SIGNED BY PARENT/GUARDIAN AND VERIFIED BY HEALTH CARE PROVIDER  ALLERGIES: (Food, drug, insect, other) has No Known Allergies.   MEDICATION: (List all prescribed or taken on a regular basis.) currently has no medications in their medication list.   Diagnosis of asthma?  Child wakes during night coughing? Yes    No  Yes    No  Loss of function of one of paired  organs?(eye/ear/kidney/testicle) Yes    No    Birth defects? Yes    No  Hospitalizations? Yes    No    Developmental delay? Yes    No   When? What for? Yes    No    Blood disorders? Hemophilia,  Sickle Cell, Other? Explain. Yes    No  Surgery? (List all.)  When? What for? Yes    No    Diabetes? Yes    No  Serious injury or illness? Yes    No    Head injury/Concussion/Passed out? Yes    No  TB skin test positive (past/present)? * Yes    No *If yes, refer to local [facility] dept   Seizures? What are they like?  Yes    No  TB disease (past or present)? * Yes    No *If yes, refer to local [facility] dept   Heart problem/Shortness of breath?  Yes    No  Tobacco use (type, frequency)?  Yes    No    Heart murmur/High blood pressure? Yes    No  Alcohol/Drug use?  Yes    No    Dizziness or chest pain with exercise? Yes    No  Family history of sudden death  before age 50? (Cause?) Yes    No    Eye/Vision problems? ______           __Glasses          __Contacts  Last exam by eye doctor ______  Other concerns? (crossed eye, drooping lids, squinting, difficulty reading) Dental?   __ Braces     __ Bridge     __ Plate   __Other   Ear/Hearing problems? Yes    No  Information may be shared with appropriate personnel for health and educational purposes.   Bone/Joint problem/injury/scoliosis? Yes    No  Parent/Guardian  Signature                                               Date   PHYSICAL EXAMINATION REQUIREMENTS   Entire section below to be completed by MD/DO/APN/PA Head Circumference if <2-3 years old - BP 90/61   Pulse 71   Temp  98 °F (36.7 °C)   Ht 4' 0.03\" (1.22 m)   Wt 21.3 kg (47 lb)   BMI 14.32 kg/m²   BSA 0.86 m²    20 %ile (Z= -0.83) based on CDC (Girls, 2-20 Years) BMI-for-age based on BMI available as of 3/17/2023.   DIABETES SCREENING (NOT REQUIRED FOR ) BMI>85% age/sex: No     And any two of the following: Family History: No  Ethnic Minority: No    Signs of Insulin Resistance (hypertension, dyslipidemia, polycystic ovarian syndrome, acanthosis nigricans): No  At Risk: No   LEAD RISK QUESTIONNAIRE Required for children age 6 months through 6 years enrolled in licensed or public school operated day care, , nursery school and/or . (Blood test required if resides in [place] or high risk zip [facility]  Questionnaire Administered? No  Blood Test Indicated? No   Blood Test Date/Result: <3 (4/3/2018).   TB SKIN OR BLOOD TEST Recommended only for children in high-risk groups including children immunosuppressed due to HIV infection or other conditions, frequent travel to or born in high prevalence countries or those exposed to adults in high-risk categories. See CDC guidelines. http://www.cdc.gov/tb/publications/factsheets/testing/TB_testing.htm.  Test Needed: No     Test performed: No                          Skin Test:    Date Read              /      /             Result:   Positive__      Negative__        mm________                          Blood Test: Date Reported       /      /               Result:  Positive__      Negative__      Value________  LAB TESTS (recommended) Date/Result  Date/Results   Hemoglobin or Hematocrit 12 (7/7/2022) Sickle Cell (when indicated)    Urinalysis NA Developmental Screening Tool Normal - ASQ        SYSTEM REVIEW Normal  Comments/Follow-up/Needs  Normal Comments/Follow-up/Needs   Skin  Yes  Endocrine Yes    Ears Yes                  Screening Result Gastrointestinal Yes    Eyes Yes                  Screening Result: Genito-Urinary Yes                                       LMP:    Nose Yes  Neurologic Yes    Throat Yes  Musculoskeletal Yes    Mouth/Dental Yes  Spinal Exam Yes    Cardiovascular/HTN Yes  Nutritional status Yes    Respiratory Yes Diagnosis of Asthma: No   Mental Health Yes    Currently Prescribed Asthma Medication:        No  Quick-relief medication (e.g. Short Acting Beta Antagonist)        No  Controller medication (e.g. inhaled corticosteroid) Other  H/o congential laryngomalacia   NEEDS/MODIFICATIONS required in the school setting: No restrictions DIETARY Needs/Restrictions: No restrictions   SPECIAL INSTRUCTIONS/DEVICES e.g. safety glasses, glass eye, chest protector for arrhythmia, pacemaker, prosthetic device, dental bridge, false teeth, athletic support/cup: No restrictions   MENTAL HEALTH/OTHER Is there anything else the school should know about this student?  If you would like to discuss this student’s health with school or school health personnel:  Not needed   EMERGENCY ACTION needed while at school due to child’s health condition (e.g. ,seizures, asthma, insect sting, food, peanut allergy, bleeding problem, diabetes, heart problem)?   No   On the basis of the examination on this day, I approve this child’s participation in                                (If No or Modified please attach explanation.)  PHYSICAL EDUCATION:  Yes                               INTERSCHOL[employer]IC SPORTS   Yes   Print Name  Mandy Cerda          Signature                                                                       Date: 3/17/2023   Address:  [address] [address]  [phone]         (Complete Both Sides)     Approved [facility] 3/2016

## 2023-04-25 ENCOUNTER — TELEPHONE (OUTPATIENT)
Dept: INTERNAL MEDICINE CLINIC | Facility: CLINIC | Age: 73
End: 2023-04-25

## 2023-04-25 NOTE — TELEPHONE ENCOUNTER
I am unable to complete requested capacity report for wife, as she has not had labs or cpx in our office since 2021; I am unable to determine capacity without evaluation of patient; he can schedule appointment for spouse. He may not need this form completed, as he is patient's spouse, in terms of being able to make medical decisions for patient; he can also consider getting legal advise regarding this matter, to have will and other documents prepared regarding POA.

## 2023-05-20 SDOH — HEALTH STABILITY: PHYSICAL HEALTH: ON AVERAGE, HOW MANY MINUTES DO YOU ENGAGE IN EXERCISE AT THIS LEVEL?: 0 MIN

## 2023-05-20 SDOH — HEALTH STABILITY: PHYSICAL HEALTH: ON AVERAGE, HOW MANY DAYS PER WEEK DO YOU ENGAGE IN MODERATE TO STRENUOUS EXERCISE (LIKE A BRISK WALK)?: 0 DAYS

## 2023-05-20 ASSESSMENT — PATIENT HEALTH QUESTIONNAIRE - PHQ9
SUM OF ALL RESPONSES TO PHQ QUESTIONS 1-9: 0
2. FEELING DOWN, DEPRESSED OR HOPELESS: 0
SUM OF ALL RESPONSES TO PHQ QUESTIONS 1-9: 0
1. LITTLE INTEREST OR PLEASURE IN DOING THINGS: 0
SUM OF ALL RESPONSES TO PHQ9 QUESTIONS 1 & 2: 0
SUM OF ALL RESPONSES TO PHQ QUESTIONS 1-9: 0
SUM OF ALL RESPONSES TO PHQ QUESTIONS 1-9: 0

## 2023-05-20 ASSESSMENT — LIFESTYLE VARIABLES
HOW MANY STANDARD DRINKS CONTAINING ALCOHOL DO YOU HAVE ON A TYPICAL DAY: 0
HOW OFTEN DO YOU HAVE A DRINK CONTAINING ALCOHOL: 1
HOW OFTEN DO YOU HAVE A DRINK CONTAINING ALCOHOL: NEVER
HOW MANY STANDARD DRINKS CONTAINING ALCOHOL DO YOU HAVE ON A TYPICAL DAY: PATIENT DOES NOT DRINK
HOW OFTEN DO YOU HAVE SIX OR MORE DRINKS ON ONE OCCASION: 1

## 2023-05-23 DIAGNOSIS — E78.2 MIXED HYPERLIPIDEMIA: ICD-10-CM

## 2023-05-23 DIAGNOSIS — K21.00 GASTROESOPHAGEAL REFLUX DISEASE WITH ESOPHAGITIS WITHOUT HEMORRHAGE: ICD-10-CM

## 2023-05-23 DIAGNOSIS — Z85.46 HISTORY OF PROSTATE CANCER: ICD-10-CM

## 2023-05-23 DIAGNOSIS — E11.9 CONTROLLED TYPE 2 DIABETES MELLITUS WITHOUT COMPLICATION, WITHOUT LONG-TERM CURRENT USE OF INSULIN (HCC): ICD-10-CM

## 2023-05-23 DIAGNOSIS — I10 PRIMARY HYPERTENSION: ICD-10-CM

## 2023-05-23 LAB
ALBUMIN SERPL-MCNC: 3.6 G/DL (ref 3.2–4.6)
ALBUMIN/GLOB SERPL: 1.3 (ref 0.4–1.6)
ALP SERPL-CCNC: 56 U/L (ref 50–136)
ALT SERPL-CCNC: 19 U/L (ref 12–65)
ANION GAP SERPL CALC-SCNC: 4 MMOL/L (ref 2–11)
APPEARANCE UR: CLEAR
AST SERPL-CCNC: 14 U/L (ref 15–37)
BASOPHILS # BLD: 0.1 K/UL (ref 0–0.2)
BASOPHILS NFR BLD: 1 % (ref 0–2)
BILIRUB SERPL-MCNC: 0.6 MG/DL (ref 0.2–1.1)
BILIRUB UR QL: NEGATIVE
BUN SERPL-MCNC: 10 MG/DL (ref 8–23)
CALCIUM SERPL-MCNC: 8.9 MG/DL (ref 8.3–10.4)
CHLORIDE SERPL-SCNC: 107 MMOL/L (ref 101–110)
CHOLEST SERPL-MCNC: 206 MG/DL
CO2 SERPL-SCNC: 30 MMOL/L (ref 21–32)
COLOR UR: NORMAL
CREAT SERPL-MCNC: 0.7 MG/DL (ref 0.8–1.5)
DIFFERENTIAL METHOD BLD: NORMAL
EOSINOPHIL # BLD: 0.2 K/UL (ref 0–0.8)
EOSINOPHIL NFR BLD: 3 % (ref 0.5–7.8)
ERYTHROCYTE [DISTWIDTH] IN BLOOD BY AUTOMATED COUNT: 14 % (ref 11.9–14.6)
EST. AVERAGE GLUCOSE BLD GHB EST-MCNC: 123 MG/DL
GLOBULIN SER CALC-MCNC: 2.7 G/DL (ref 2.8–4.5)
GLUCOSE SERPL-MCNC: 149 MG/DL (ref 65–100)
GLUCOSE UR STRIP.AUTO-MCNC: NEGATIVE MG/DL
HBA1C MFR BLD: 5.9 % (ref 4.8–5.6)
HCT VFR BLD AUTO: 47.1 % (ref 41.1–50.3)
HDLC SERPL-MCNC: 65 MG/DL (ref 40–60)
HDLC SERPL: 3.2
HGB BLD-MCNC: 15.8 G/DL (ref 13.6–17.2)
HGB UR QL STRIP: NEGATIVE
IMM GRANULOCYTES # BLD AUTO: 0 K/UL (ref 0–0.5)
IMM GRANULOCYTES NFR BLD AUTO: 1 % (ref 0–5)
KETONES UR QL STRIP.AUTO: NEGATIVE MG/DL
LDLC SERPL CALC-MCNC: 117 MG/DL
LEUKOCYTE ESTERASE UR QL STRIP.AUTO: NEGATIVE
LYMPHOCYTES # BLD: 1.6 K/UL (ref 0.5–4.6)
LYMPHOCYTES NFR BLD: 30 % (ref 13–44)
MCH RBC QN AUTO: 30.3 PG (ref 26.1–32.9)
MCHC RBC AUTO-ENTMCNC: 33.5 G/DL (ref 31.4–35)
MCV RBC AUTO: 90.4 FL (ref 82–102)
MONOCYTES # BLD: 0.5 K/UL (ref 0.1–1.3)
MONOCYTES NFR BLD: 10 % (ref 4–12)
NEUTS SEG # BLD: 3 K/UL (ref 1.7–8.2)
NEUTS SEG NFR BLD: 55 % (ref 43–78)
NITRITE UR QL STRIP.AUTO: NEGATIVE
NRBC # BLD: 0 K/UL (ref 0–0.2)
PH UR STRIP: 5.5 (ref 5–9)
PLATELET # BLD AUTO: 153 K/UL (ref 150–450)
PMV BLD AUTO: 10.8 FL (ref 9.4–12.3)
POTASSIUM SERPL-SCNC: 4.5 MMOL/L (ref 3.5–5.1)
PROT SERPL-MCNC: 6.3 G/DL (ref 6.3–8.2)
PROT UR STRIP-MCNC: NEGATIVE MG/DL
RBC # BLD AUTO: 5.21 M/UL (ref 4.23–5.6)
SODIUM SERPL-SCNC: 141 MMOL/L (ref 133–143)
SP GR UR REFRACTOMETRY: 1.02 (ref 1–1.02)
T4 FREE SERPL-MCNC: 0.9 NG/DL (ref 0.78–1.46)
TRIGL SERPL-MCNC: 120 MG/DL (ref 35–150)
TSH, 3RD GENERATION: 2.07 UIU/ML (ref 0.36–3.74)
UROBILINOGEN UR QL STRIP.AUTO: 0.2 EU/DL (ref 0.2–1)
VLDLC SERPL CALC-MCNC: 24 MG/DL (ref 6–23)
WBC # BLD AUTO: 5.4 K/UL (ref 4.3–11.1)

## 2023-05-24 LAB
CREAT UR-MCNC: 237 MG/DL
MICROALBUMIN UR-MCNC: 4.01 MG/DL
MICROALBUMIN/CREAT UR-RTO: 17 MG/G (ref 0–30)
PSA FREE MFR SERPL: NORMAL %
PSA FREE SERPL-MCNC: <0.1 NG/ML
PSA SERPL-MCNC: 0.1 NG/ML

## 2023-05-30 ENCOUNTER — OFFICE VISIT (OUTPATIENT)
Dept: INTERNAL MEDICINE CLINIC | Facility: CLINIC | Age: 73
End: 2023-05-30
Payer: MEDICARE

## 2023-05-30 VITALS
OXYGEN SATURATION: 98 % | HEIGHT: 72 IN | BODY MASS INDEX: 39.25 KG/M2 | TEMPERATURE: 98.3 F | DIASTOLIC BLOOD PRESSURE: 88 MMHG | SYSTOLIC BLOOD PRESSURE: 142 MMHG | HEART RATE: 84 BPM | WEIGHT: 289.8 LBS | RESPIRATION RATE: 16 BRPM

## 2023-05-30 DIAGNOSIS — E66.01 OBESITY, MORBID (HCC): ICD-10-CM

## 2023-05-30 DIAGNOSIS — E11.9 CONTROLLED TYPE 2 DIABETES MELLITUS WITHOUT COMPLICATION, WITHOUT LONG-TERM CURRENT USE OF INSULIN (HCC): ICD-10-CM

## 2023-05-30 DIAGNOSIS — I10 PRIMARY HYPERTENSION: ICD-10-CM

## 2023-05-30 DIAGNOSIS — Z00.00 MEDICARE ANNUAL WELLNESS VISIT, SUBSEQUENT: Primary | ICD-10-CM

## 2023-05-30 DIAGNOSIS — E78.2 MIXED HYPERLIPIDEMIA: ICD-10-CM

## 2023-05-30 DIAGNOSIS — Z85.46 HISTORY OF PROSTATE CANCER: ICD-10-CM

## 2023-05-30 PROCEDURE — G8427 DOCREV CUR MEDS BY ELIG CLIN: HCPCS | Performed by: INTERNAL MEDICINE

## 2023-05-30 PROCEDURE — 1036F TOBACCO NON-USER: CPT | Performed by: INTERNAL MEDICINE

## 2023-05-30 PROCEDURE — 3017F COLORECTAL CA SCREEN DOC REV: CPT | Performed by: INTERNAL MEDICINE

## 2023-05-30 PROCEDURE — 2022F DILAT RTA XM EVC RTNOPTHY: CPT | Performed by: INTERNAL MEDICINE

## 2023-05-30 PROCEDURE — 3077F SYST BP >= 140 MM HG: CPT | Performed by: INTERNAL MEDICINE

## 2023-05-30 PROCEDURE — 1123F ACP DISCUSS/DSCN MKR DOCD: CPT | Performed by: INTERNAL MEDICINE

## 2023-05-30 PROCEDURE — 3079F DIAST BP 80-89 MM HG: CPT | Performed by: INTERNAL MEDICINE

## 2023-05-30 PROCEDURE — 99214 OFFICE O/P EST MOD 30 MIN: CPT | Performed by: INTERNAL MEDICINE

## 2023-05-30 PROCEDURE — G8417 CALC BMI ABV UP PARAM F/U: HCPCS | Performed by: INTERNAL MEDICINE

## 2023-05-30 PROCEDURE — G0439 PPPS, SUBSEQ VISIT: HCPCS | Performed by: INTERNAL MEDICINE

## 2023-05-30 PROCEDURE — 3044F HG A1C LEVEL LT 7.0%: CPT | Performed by: INTERNAL MEDICINE

## 2023-05-30 RX ORDER — VALSARTAN 160 MG/1
160 TABLET ORAL DAILY
Qty: 90 TABLET | Refills: 1 | Status: SHIPPED | OUTPATIENT
Start: 2023-05-30

## 2023-05-30 SDOH — ECONOMIC STABILITY: FOOD INSECURITY: WITHIN THE PAST 12 MONTHS, YOU WORRIED THAT YOUR FOOD WOULD RUN OUT BEFORE YOU GOT MONEY TO BUY MORE.: NEVER TRUE

## 2023-05-30 SDOH — ECONOMIC STABILITY: INCOME INSECURITY: HOW HARD IS IT FOR YOU TO PAY FOR THE VERY BASICS LIKE FOOD, HOUSING, MEDICAL CARE, AND HEATING?: NOT HARD AT ALL

## 2023-05-30 SDOH — ECONOMIC STABILITY: HOUSING INSECURITY
IN THE LAST 12 MONTHS, WAS THERE A TIME WHEN YOU DID NOT HAVE A STEADY PLACE TO SLEEP OR SLEPT IN A SHELTER (INCLUDING NOW)?: NO

## 2023-05-30 SDOH — ECONOMIC STABILITY: FOOD INSECURITY: WITHIN THE PAST 12 MONTHS, THE FOOD YOU BOUGHT JUST DIDN'T LAST AND YOU DIDN'T HAVE MONEY TO GET MORE.: NEVER TRUE

## 2023-05-30 ASSESSMENT — ENCOUNTER SYMPTOMS
SHORTNESS OF BREATH: 0
BLOOD IN STOOL: 0
COUGH: 0
ABDOMINAL PAIN: 0
RHINORRHEA: 0

## 2023-05-30 NOTE — PROGRESS NOTES
[facility]      2023    Patient Name: Abdiel Conn  :  1950    Subjective:    Chief Complaint:  Chief Complaint   Patient presents with    Medicare AWV     Pt is here for Medicare Wellness and to review labs. HPI Here for f/u visit; patient had fasting labs done recently and results were reviewed in detail today; He does plan on starting back exercising and doing better with his die;   Colonoscopy:   Eye exam: 10/2022  Dental exam: 3/2023  Pneumovax:   Prevnar 13:   Shingrix:   Tdap: 2018  Fluvax: 2022  Mark Paniagua 19: 3/4, 2021  Alvarado Tommie booster: 2022  HTN:  Patient compliant with taking blood pressure medications: Yes  Discussed importance of following low sodium DASH diet, increasing physical activity, taking medications as ordered, decreasing alcohol intake, keeping f/u visits to recheck blood pressure, monitoring blood pressure at home and keeping a log, with goal blood pressure <140/90. Home bp readings are: 161/85, 139/82, 141/89, 160/95  Diabetes:  Blood sugar readings:   Patient compliant with low carbohydrate diet, with occasional dietary indiscretions. Patient is sedentary and does not exercise. Regular exercise recommended.   Patient advised on foot care and regular foot exam.  Last eye exam: 10/2022  Last HgbA1c:   Lab Results   Component Value Date    LABA1C 5.9 (H) 2023     Lab Results   Component Value Date     2023            Past Medical History:   Diagnosis Date    Allergic sinusitis     Cholelithiasis     CKD (chronic kidney disease)     pt denies; CMP WNL (2019, 2019)    Degenerative disc disease, cervical     Elevated PSA     GERD (gastroesophageal reflux disease)     managed with medication    History of prostate cancer 2002    Surgery, chemo, radiation     Hx of malignant neoplasm of soft tissue     Hx of syncope     Hyperlipidemia     Hypertension     Migraine     \"haven't had in years\"

## 2023-05-30 NOTE — PATIENT INSTRUCTIONS
If you're thinking about losing weight, it can be hard to know where to start. Your doctor can help you set up a weight loss plan that best meets your needs. You may want to take a class on nutrition or exercise, or you could join a weight loss support group. If you have questions about how to make changes to your eating or exercise habits, ask your doctor about seeing a registered dietitian or an exercise specialist.  It can be a big challenge to lose weight. But you don't have to make huge changes at once. Make small changes, and stick with them. When those changes become habit, add a few more changes. If you don't think you're ready to make changes right now, try to pick a date in the future. Make an appointment to see your doctor to discuss whether the time is right for you to start a plan. Follow-up care is a key part of your treatment and safety. Be sure to make and go to all appointments, and call your doctor if you are having problems. It's also a good idea to know your test results and keep a list of the medicines you take. How can you care for yourself at home? Set realistic goals. Many people expect to lose much more weight than is likely. A weight loss of 5% to 10% of your body weight may be enough to improve your health. Get family and friends involved to provide support. Talk to them about why you are trying to lose weight, and ask them to help. They can help by participating in exercise and having meals with you, even if they may be eating something different. Find what works best for you. If you do not have time or do not like to cook, a program that offers meal replacement bars or shakes may be better for you. Or if you like to prepare meals, finding a plan that includes daily menus and recipes may be best.  Ask your doctor about other health professionals who can help you achieve your weight loss goals. A dietitian can help you make healthy changes in your diet.   An exercise specialist or

## 2023-06-21 ENCOUNTER — PATIENT MESSAGE (OUTPATIENT)
Dept: INTERNAL MEDICINE CLINIC | Facility: CLINIC | Age: 73
End: 2023-06-21

## 2023-06-21 DIAGNOSIS — E78.2 MIXED HYPERLIPIDEMIA: ICD-10-CM

## 2023-06-21 DIAGNOSIS — M17.0 PRIMARY OSTEOARTHRITIS OF BOTH KNEES: ICD-10-CM

## 2023-06-21 DIAGNOSIS — E11.9 CONTROLLED TYPE 2 DIABETES MELLITUS WITHOUT COMPLICATION, WITHOUT LONG-TERM CURRENT USE OF INSULIN (HCC): Primary | ICD-10-CM

## 2023-06-21 DIAGNOSIS — J30.1 SEASONAL ALLERGIC RHINITIS DUE TO POLLEN: ICD-10-CM

## 2023-06-21 RX ORDER — ATORVASTATIN CALCIUM 40 MG/1
TABLET, FILM COATED ORAL
Qty: 90 TABLET | Refills: 1 | Status: SHIPPED | OUTPATIENT
Start: 2023-06-21

## 2023-06-21 RX ORDER — MONTELUKAST SODIUM 10 MG/1
TABLET ORAL
Qty: 90 TABLET | Refills: 1 | Status: SHIPPED | OUTPATIENT
Start: 2023-06-21

## 2023-06-21 RX ORDER — EZETIMIBE 10 MG/1
TABLET ORAL
Qty: 90 TABLET | Refills: 1 | Status: SHIPPED | OUTPATIENT
Start: 2023-06-21

## 2023-06-21 RX ORDER — CELECOXIB 100 MG/1
100 CAPSULE ORAL 2 TIMES DAILY
Qty: 180 CAPSULE | Refills: 1 | Status: SHIPPED | OUTPATIENT
Start: 2023-06-21

## 2023-06-21 NOTE — TELEPHONE ENCOUNTER
From: Kristen Wilcox  To: Dr. Aleman Knee: 6/21/2023 1:07 PM EDT  Subject: Prescriptions    Hello Dr Olaf Bloom and [facility],    I hope you are enjoying this beautiful rainy day. I need some help renewing some prescriptions. They are:    Choline Fenobrate (Trilipix)  Ezetimibe (Zetia)  Januvia  Atorvastatin  Montelukast Sod  Celecoxib    Also I see in my My Chart a prescription for Cholecalcifisol 100 MG. I do not recall this. I think it is vitamin D3. Not saying we did not talk about it, I just don't remember. If I need this, please add it to the list. [facility] use [facility]. thank you for your help.     Art Nuha

## 2023-07-16 NOTE — ED TRIAGE NOTES
Pt to ED c/o swelling to right leg that started on Thursday. Hx of DVT to same leg. Currently takes aspirin 325mg daily. No other thinners. Denies shortness of breath or chest pain. Able to ambulate but c/o worsening pain.
normal

## 2023-11-23 DIAGNOSIS — I10 PRIMARY HYPERTENSION: ICD-10-CM

## 2023-11-24 RX ORDER — VALSARTAN 160 MG/1
160 TABLET ORAL DAILY
Qty: 90 TABLET | Refills: 1 | OUTPATIENT
Start: 2023-11-24

## 2023-11-27 ENCOUNTER — HOSPITAL ENCOUNTER (OUTPATIENT)
Dept: LAB | Age: 73
Discharge: HOME | End: 2023-11-30

## 2023-11-27 DIAGNOSIS — I10 PRIMARY HYPERTENSION: Primary | ICD-10-CM

## 2023-11-27 DIAGNOSIS — I10 PRIMARY HYPERTENSION: ICD-10-CM

## 2023-11-27 DIAGNOSIS — E78.2 MIXED HYPERLIPIDEMIA: ICD-10-CM

## 2023-11-27 DIAGNOSIS — E11.9 CONTROLLED TYPE 2 DIABETES MELLITUS WITHOUT COMPLICATION, WITHOUT LONG-TERM CURRENT USE OF INSULIN (HCC): ICD-10-CM

## 2023-11-27 LAB
ALBUMIN SERPL-MCNC: 3.5 G/DL (ref 3.2–4.6)
ALBUMIN/GLOB SERPL: 1.2 (ref 0.4–1.6)
ALP SERPL-CCNC: 65 U/L (ref 50–136)
ALT SERPL-CCNC: 24 U/L (ref 12–65)
ANION GAP SERPL CALC-SCNC: 5 MMOL/L (ref 2–11)
AST SERPL-CCNC: 11 U/L (ref 15–37)
BASOPHILS # BLD: 0.1 K/UL (ref 0–0.2)
BASOPHILS NFR BLD: 1 % (ref 0–2)
BILIRUB SERPL-MCNC: 0.5 MG/DL (ref 0.2–1.1)
BUN SERPL-MCNC: 14 MG/DL (ref 8–23)
CALCIUM SERPL-MCNC: 9.3 MG/DL (ref 8.3–10.4)
CHLORIDE SERPL-SCNC: 104 MMOL/L (ref 101–110)
CHOLEST SERPL-MCNC: 286 MG/DL
CO2 SERPL-SCNC: 31 MMOL/L (ref 21–32)
CREAT SERPL-MCNC: 0.9 MG/DL (ref 0.8–1.5)
DIFFERENTIAL METHOD BLD: NORMAL
EOSINOPHIL # BLD: 0.1 K/UL (ref 0–0.8)
EOSINOPHIL NFR BLD: 2 % (ref 0.5–7.8)
ERYTHROCYTE [DISTWIDTH] IN BLOOD BY AUTOMATED COUNT: 14.2 % (ref 11.9–14.6)
GLOBULIN SER CALC-MCNC: 3 G/DL (ref 2.8–4.5)
GLUCOSE SERPL-MCNC: 168 MG/DL (ref 65–100)
HCT VFR BLD AUTO: 48.7 % (ref 41.1–50.3)
HDLC SERPL-MCNC: 62 MG/DL (ref 40–60)
HDLC SERPL: 4.6
HGB BLD-MCNC: 16.1 G/DL (ref 13.6–17.2)
IMM GRANULOCYTES # BLD AUTO: 0.1 K/UL (ref 0–0.5)
IMM GRANULOCYTES NFR BLD AUTO: 1 % (ref 0–5)
LDLC SERPL CALC-MCNC: 171.4 MG/DL
LYMPHOCYTES # BLD: 2 K/UL (ref 0.5–4.6)
LYMPHOCYTES NFR BLD: 32 % (ref 13–44)
MCH RBC QN AUTO: 31.5 PG (ref 26.1–32.9)
MCHC RBC AUTO-ENTMCNC: 33.1 G/DL (ref 31.4–35)
MCV RBC AUTO: 95.3 FL (ref 82–102)
MONOCYTES # BLD: 0.5 K/UL (ref 0.1–1.3)
MONOCYTES NFR BLD: 8 % (ref 4–12)
NEUTS SEG # BLD: 3.6 K/UL (ref 1.7–8.2)
NEUTS SEG NFR BLD: 56 % (ref 43–78)
NRBC # BLD: 0 K/UL (ref 0–0.2)
PLATELET # BLD AUTO: 153 K/UL (ref 150–450)
PMV BLD AUTO: 10.9 FL (ref 9.4–12.3)
POTASSIUM SERPL-SCNC: 4.2 MMOL/L (ref 3.5–5.1)
PROT SERPL-MCNC: 6.5 G/DL (ref 6.3–8.2)
RBC # BLD AUTO: 5.11 M/UL (ref 4.23–5.6)
SODIUM SERPL-SCNC: 140 MMOL/L (ref 133–143)
TRIGL SERPL-MCNC: 263 MG/DL (ref 35–150)
TSH, 3RD GENERATION: 2.01 UIU/ML (ref 0.36–3.74)
VLDLC SERPL CALC-MCNC: 52.6 MG/DL (ref 6–23)
WBC # BLD AUTO: 6.4 K/UL (ref 4.3–11.1)

## 2023-11-28 LAB
EST. AVERAGE GLUCOSE BLD GHB EST-MCNC: 131 MG/DL
HBA1C MFR BLD: 6.2 % (ref 4.8–5.6)

## 2023-12-26 ENCOUNTER — OFFICE VISIT (OUTPATIENT)
Dept: INTERNAL MEDICINE CLINIC | Facility: CLINIC | Age: 73
End: 2023-12-26
Payer: MEDICARE

## 2023-12-26 VITALS
SYSTOLIC BLOOD PRESSURE: 160 MMHG | BODY MASS INDEX: 41.77 KG/M2 | OXYGEN SATURATION: 96 % | DIASTOLIC BLOOD PRESSURE: 100 MMHG | RESPIRATION RATE: 16 BRPM | TEMPERATURE: 98.3 F | WEIGHT: 308.4 LBS | HEART RATE: 88 BPM | HEIGHT: 72 IN

## 2023-12-26 DIAGNOSIS — D68.51 FACTOR 5 LEIDEN MUTATION, HETEROZYGOUS (HCC): ICD-10-CM

## 2023-12-26 DIAGNOSIS — E55.9 VITAMIN D INSUFFICIENCY: ICD-10-CM

## 2023-12-26 DIAGNOSIS — I10 PRIMARY HYPERTENSION: ICD-10-CM

## 2023-12-26 DIAGNOSIS — Z85.831 HISTORY OF SARCOMA OF SOFT TISSUE: ICD-10-CM

## 2023-12-26 DIAGNOSIS — Z23 NEED FOR INFLUENZA VACCINATION: ICD-10-CM

## 2023-12-26 DIAGNOSIS — E66.01 OBESITY, MORBID (HCC): ICD-10-CM

## 2023-12-26 DIAGNOSIS — Z85.46 HISTORY OF PROSTATE CANCER: ICD-10-CM

## 2023-12-26 DIAGNOSIS — E11.9 CONTROLLED TYPE 2 DIABETES MELLITUS WITHOUT COMPLICATION, WITHOUT LONG-TERM CURRENT USE OF INSULIN (HCC): Primary | ICD-10-CM

## 2023-12-26 DIAGNOSIS — K21.00 GASTROESOPHAGEAL REFLUX DISEASE WITH ESOPHAGITIS WITHOUT HEMORRHAGE: ICD-10-CM

## 2023-12-26 DIAGNOSIS — J30.1 SEASONAL ALLERGIC RHINITIS DUE TO POLLEN: ICD-10-CM

## 2023-12-26 DIAGNOSIS — E78.2 MIXED HYPERLIPIDEMIA: ICD-10-CM

## 2023-12-26 PROCEDURE — 1123F ACP DISCUSS/DSCN MKR DOCD: CPT | Performed by: INTERNAL MEDICINE

## 2023-12-26 PROCEDURE — 90694 VACC AIIV4 NO PRSRV 0.5ML IM: CPT | Performed by: INTERNAL MEDICINE

## 2023-12-26 PROCEDURE — G8484 FLU IMMUNIZE NO ADMIN: HCPCS | Performed by: INTERNAL MEDICINE

## 2023-12-26 PROCEDURE — 3044F HG A1C LEVEL LT 7.0%: CPT | Performed by: INTERNAL MEDICINE

## 2023-12-26 PROCEDURE — 2022F DILAT RTA XM EVC RTNOPTHY: CPT | Performed by: INTERNAL MEDICINE

## 2023-12-26 PROCEDURE — 3017F COLORECTAL CA SCREEN DOC REV: CPT | Performed by: INTERNAL MEDICINE

## 2023-12-26 PROCEDURE — 99214 OFFICE O/P EST MOD 30 MIN: CPT | Performed by: INTERNAL MEDICINE

## 2023-12-26 PROCEDURE — G0008 ADMIN INFLUENZA VIRUS VAC: HCPCS | Performed by: INTERNAL MEDICINE

## 2023-12-26 PROCEDURE — 1036F TOBACCO NON-USER: CPT | Performed by: INTERNAL MEDICINE

## 2023-12-26 PROCEDURE — G8427 DOCREV CUR MEDS BY ELIG CLIN: HCPCS | Performed by: INTERNAL MEDICINE

## 2023-12-26 PROCEDURE — 3080F DIAST BP >= 90 MM HG: CPT | Performed by: INTERNAL MEDICINE

## 2023-12-26 PROCEDURE — 3077F SYST BP >= 140 MM HG: CPT | Performed by: INTERNAL MEDICINE

## 2023-12-26 PROCEDURE — G8417 CALC BMI ABV UP PARAM F/U: HCPCS | Performed by: INTERNAL MEDICINE

## 2023-12-26 RX ORDER — METFORMIN HYDROCHLORIDE 500 MG/1
500 TABLET, EXTENDED RELEASE ORAL
Qty: 90 TABLET | Refills: 1 | Status: SHIPPED | OUTPATIENT
Start: 2023-12-26

## 2023-12-26 RX ORDER — FLASH GLUCOSE SENSOR
KIT MISCELLANEOUS
Qty: 12 EACH | Refills: 3 | Status: SHIPPED | OUTPATIENT
Start: 2023-12-26

## 2023-12-26 RX ORDER — FEXOFENADINE HCL 180 MG/1
180 TABLET ORAL DAILY
COMMUNITY
End: 2023-12-26 | Stop reason: SDUPTHER

## 2023-12-26 RX ORDER — FEXOFENADINE HCL 180 MG/1
180 TABLET ORAL DAILY
Qty: 90 TABLET | Refills: 1 | Status: SHIPPED | OUTPATIENT
Start: 2023-12-26

## 2023-12-26 NOTE — PROGRESS NOTES
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    Relocate nerve in right elbow    PROSTATECTOMY      not removed, radiation seeds       Family History   Problem Relation Age of Onset    Cancer Father         lung    Heart Attack Mother     Heart Disease Mother     Cancer Paternal Grandmother     Cancer Paternal Grandfather     Lung Disease Paternal Grandfather        Social History     Tobacco Use    Smoking status: Former     Current packs/day: 0.00     Types: Cigarettes     Quit date: 1984     Years since quittin.6    Smokeless tobacco: Never   Substance Use Topics    Alcohol use: Not Currently    Drug use:  No                 Current Outpatient Medications:     fexofenadine (ALLEGRA) 180 MG tablet, Take 1 tablet by mouth daily, Disp: 90 tablet, Rfl: 1    Continuous Blood Gluc Sensor (FREESTYLE TORRI 14 DAY SENSOR) [facility] BLOOD GLUCOSE 3 TIMES A DAY, Disp: 12 each, Rfl: 3    metFORMIN (GLUCOPHAGE-XR) 500 MG extended release tablet, Take 1 tablet by mouth Daily with supper, Disp: 90 tablet, Rfl: 1    choline fenofibrate (TRILIPIX) 135 MG CPDR delayed release capsule, Take 1 capsule by oral route every day, Disp: 90 capsule, Rfl: 1    ezetimibe (ZETIA) 10 MG tablet, Take 1 tablet by oral route every day, Disp: 90 tablet, Rfl: 1    SITagliptin (JANUVIA) 100 MG tablet, Take 1 tablet by oral route every day, Disp: 90 tablet, Rfl: 1    atorvastatin (LIPITOR) 40 MG tablet,

## 2024-01-25 ENCOUNTER — PATIENT MESSAGE (OUTPATIENT)
Dept: INTERNAL MEDICINE CLINIC | Facility: CLINIC | Age: 74
End: 2024-01-25

## 2024-01-25 DIAGNOSIS — E11.9 CONTROLLED TYPE 2 DIABETES MELLITUS WITHOUT COMPLICATION, WITHOUT LONG-TERM CURRENT USE OF INSULIN (HCC): Primary | ICD-10-CM

## 2024-01-25 NOTE — TELEPHONE ENCOUNTER
From: Dre Babcock  To: Dr. Magalys Salter  Sent: 1/25/2024 9:04 AM EST  Subject: Farxiga Refill    Good Morning Dr. Salter,  Would you please refill my prescription for Farxiga, 10mg, once a day. [facility] [place].     Thank you... Dre Babcock

## 2024-06-14 DIAGNOSIS — M17.0 PRIMARY OSTEOARTHRITIS OF BOTH KNEES: ICD-10-CM

## 2024-06-14 RX ORDER — CELECOXIB 100 MG/1
100 CAPSULE ORAL 2 TIMES DAILY
Qty: 180 CAPSULE | Refills: 1 | OUTPATIENT
Start: 2024-06-14

## 2024-06-17 ENCOUNTER — APPOINTMENT (OUTPATIENT)
Dept: CT IMAGING | Age: 74
End: 2024-06-17
Payer: MEDICARE

## 2024-06-17 ENCOUNTER — HOSPITAL ENCOUNTER (EMERGENCY)
Age: 74
Discharge: HOME | End: 2024-06-17
Attending: STUDENT IN AN ORGANIZED HEALTH CARE EDUCATION/TRAINING PROGRAM
Payer: MEDICARE

## 2024-06-17 ENCOUNTER — APPOINTMENT (OUTPATIENT)
Dept: ULTRASOUND IMAGING | Age: 74
End: 2024-06-17
Payer: MEDICARE

## 2024-06-17 VITALS
WEIGHT: 310 LBS | BODY MASS INDEX: 41.99 KG/M2 | OXYGEN SATURATION: 94 % | DIASTOLIC BLOOD PRESSURE: 87 MMHG | HEIGHT: 72 IN | HEART RATE: 87 BPM | RESPIRATION RATE: 15 BRPM | TEMPERATURE: 97.5 F | SYSTOLIC BLOOD PRESSURE: 173 MMHG

## 2024-06-17 DIAGNOSIS — I82.5Y9 CHRONIC DEEP VEIN THROMBOSIS (DVT) OF PROXIMAL VEIN OF LOWER EXTREMITY, UNSPECIFIED LATERALITY (HCC): ICD-10-CM

## 2024-06-17 DIAGNOSIS — M79.604 PAIN OF RIGHT LOWER EXTREMITY: Primary | ICD-10-CM

## 2024-06-17 LAB
ALBUMIN SERPL-MCNC: 3.3 G/DL (ref 3.2–4.6)
ALBUMIN/GLOB SERPL: 1.2 (ref 1–1.9)
ALP SERPL-CCNC: 49 U/L (ref 40–129)
ALT SERPL-CCNC: 17 U/L (ref 12–65)
ANION GAP SERPL CALC-SCNC: 12 MMOL/L (ref 9–18)
AST SERPL-CCNC: 18 U/L (ref 15–37)
BASOPHILS # BLD: 0 K/UL (ref 0–0.2)
BASOPHILS NFR BLD: 0 % (ref 0–2)
BILIRUB SERPL-MCNC: 0.2 MG/DL (ref 0–1.2)
BUN SERPL-MCNC: 24 MG/DL (ref 8–23)
CALCIUM SERPL-MCNC: 9.9 MG/DL (ref 8.8–10.2)
CHLORIDE SERPL-SCNC: 104 MMOL/L (ref 98–107)
CO2 SERPL-SCNC: 26 MMOL/L (ref 20–28)
CREAT SERPL-MCNC: 0.91 MG/DL (ref 0.8–1.3)
DIFFERENTIAL METHOD BLD: NORMAL
EOSINOPHIL # BLD: 0.1 K/UL (ref 0–0.8)
EOSINOPHIL NFR BLD: 2 % (ref 0.5–7.8)
ERYTHROCYTE [DISTWIDTH] IN BLOOD BY AUTOMATED COUNT: 13.8 % (ref 11.9–14.6)
ERYTHROCYTE [SEDIMENTATION RATE] IN BLOOD: 4 MM/HR (ref 0–15)
GLOBULIN SER CALC-MCNC: 2.7 G/DL (ref 2.3–3.5)
GLUCOSE SERPL-MCNC: 189 MG/DL (ref 70–99)
HCT VFR BLD AUTO: 43.9 % (ref 41.1–50.3)
HGB BLD-MCNC: 14.8 G/DL (ref 13.6–17.2)
IMM GRANULOCYTES # BLD AUTO: 0.1 K/UL (ref 0–0.5)
IMM GRANULOCYTES NFR BLD AUTO: 1 % (ref 0–5)
LACTATE SERPL-SCNC: 2 MMOL/L (ref 0.5–2)
LACTATE SERPL-SCNC: 2.4 MMOL/L (ref 0.5–2)
LYMPHOCYTES # BLD: 1.9 K/UL (ref 0.5–4.6)
LYMPHOCYTES NFR BLD: 27 % (ref 13–44)
MCH RBC QN AUTO: 30.7 PG (ref 26.1–32.9)
MCHC RBC AUTO-ENTMCNC: 33.7 G/DL (ref 31.4–35)
MCV RBC AUTO: 91.1 FL (ref 82–102)
MONOCYTES # BLD: 0.7 K/UL (ref 0.1–1.3)
MONOCYTES NFR BLD: 10 % (ref 4–12)
NEUTS SEG # BLD: 4.2 K/UL (ref 1.7–8.2)
NEUTS SEG NFR BLD: 60 % (ref 43–78)
NRBC # BLD: 0 K/UL (ref 0–0.2)
NT PRO BNP: 70 PG/ML (ref 0–125)
PLATELET # BLD AUTO: 152 K/UL (ref 150–450)
PMV BLD AUTO: 10.5 FL (ref 9.4–12.3)
POTASSIUM SERPL-SCNC: 4.2 MMOL/L (ref 3.5–5.1)
PROCALCITONIN SERPL-MCNC: 0.07 NG/ML (ref 0–0.1)
PROT SERPL-MCNC: 6 G/DL (ref 6.3–8.2)
RBC # BLD AUTO: 4.82 M/UL (ref 4.23–5.6)
SODIUM SERPL-SCNC: 142 MMOL/L (ref 136–145)
TROPONIN T SERPL HS-MCNC: 14 NG/L (ref 0–22)
TROPONIN T SERPL HS-MCNC: 14 NG/L (ref 0–22)
WBC # BLD AUTO: 7 K/UL (ref 4.3–11.1)

## 2024-06-17 PROCEDURE — 80053 COMPREHEN METABOLIC PANEL: CPT

## 2024-06-17 PROCEDURE — 99285 EMERGENCY DEPT VISIT HI MDM: CPT

## 2024-06-17 PROCEDURE — 85025 COMPLETE CBC W/AUTO DIFF WBC: CPT

## 2024-06-17 PROCEDURE — 93970 EXTREMITY STUDY: CPT

## 2024-06-17 PROCEDURE — 84145 PROCALCITONIN (PCT): CPT

## 2024-06-17 PROCEDURE — 71260 CT THORAX DX C+: CPT

## 2024-06-17 PROCEDURE — 87040 BLOOD CULTURE FOR BACTERIA: CPT

## 2024-06-17 PROCEDURE — 83605 ASSAY OF LACTIC ACID: CPT

## 2024-06-17 PROCEDURE — 83880 ASSAY OF NATRIURETIC PEPTIDE: CPT

## 2024-06-17 PROCEDURE — 96360 HYDRATION IV INFUSION INIT: CPT

## 2024-06-17 PROCEDURE — 6360000004 HC RX CONTRAST MEDICATION: Performed by: STUDENT IN AN ORGANIZED HEALTH CARE EDUCATION/TRAINING PROGRAM

## 2024-06-17 PROCEDURE — 84484 ASSAY OF TROPONIN QUANT: CPT

## 2024-06-17 PROCEDURE — 6370000000 HC RX 637 (ALT 250 FOR IP): Performed by: EMERGENCY MEDICINE

## 2024-06-17 PROCEDURE — 93005 ELECTROCARDIOGRAM TRACING: CPT | Performed by: STUDENT IN AN ORGANIZED HEALTH CARE EDUCATION/TRAINING PROGRAM

## 2024-06-17 PROCEDURE — 2580000003 HC RX 258: Performed by: EMERGENCY MEDICINE

## 2024-06-17 PROCEDURE — 85652 RBC SED RATE AUTOMATED: CPT

## 2024-06-17 RX ORDER — 0.9 % SODIUM CHLORIDE 0.9 %
1000 INTRAVENOUS SOLUTION INTRAVENOUS
Status: COMPLETED | OUTPATIENT
Start: 2024-06-17 | End: 2024-06-17

## 2024-06-17 RX ADMIN — SODIUM CHLORIDE 1000 ML: 9 INJECTION, SOLUTION INTRAVENOUS at 16:59

## 2024-06-17 RX ADMIN — RIVAROXABAN 20 MG: 20 TABLET, FILM COATED ORAL at 20:04

## 2024-06-17 RX ADMIN — IOPAMIDOL 100 ML: 755 INJECTION, SOLUTION INTRAVENOUS at 16:29

## 2024-06-17 ASSESSMENT — PAIN SCALES - GENERAL: PAINLEVEL_OUTOF10: 4

## 2024-06-17 ASSESSMENT — LIFESTYLE VARIABLES
HOW MANY STANDARD DRINKS CONTAINING ALCOHOL DO YOU HAVE ON A TYPICAL DAY: PATIENT DOES NOT DRINK
HOW OFTEN DO YOU HAVE A DRINK CONTAINING ALCOHOL: NEVER

## 2024-06-17 ASSESSMENT — PAIN - FUNCTIONAL ASSESSMENT: PAIN_FUNCTIONAL_ASSESSMENT: 0-10

## 2024-06-17 NOTE — ED PROVIDER NOTES
Emergency Department Provider Signout / Continuation of Care Note         DISPOSITION Decision To Discharge 06/17/2024 07:46:58 PM       ICD-10-CM    1. Pain of right lower extremity  M79.604       2. Chronic deep vein thrombosis (DVT) of proximal vein of lower extremity, unspecified laterality (HCC)  I82.5Y9           The patient's care was signed out to me at shift change.      Final Plan      Assumed care from Dr. Ontiveros at 1600    CT PE protocol negative for acute thrombus.  Venous Doppler does reveal chronic thrombus but no acute findings    Patient will be given a dose of Xarelto tonight as he has been noncompliant  I refilled his Xarelto prescription  Patient strongly encouraged to make contact with his doctor tomorrow so that he can be maintained on consistent and daily anticoagulation therapy.                  I interpreted the CT Scan CT PE protocol negative for acute pulmonary embolism per radiologist report.  I interpreted the Ultrasound  bilateral venous Dopplers of the lower extremity reveal no evidence of acute DVT but chronic DVT is noted per radiologist report.                 Jerson Leary MD  06/1950

## 2024-06-17 NOTE — ED PROVIDER NOTES
Emergency Department Provider Note       PCP: Magayls Salter MD   Age: 73 y.o.   Sex: male     DISPOSITION         ICD-10-CM    1. Pain of right lower extremity  M79.604           Medical Decision Making     73-year-old male presents to the emergency department complaining of right knee and right leg swelling that he noticed on Saturday.  States he was doing work for family and had been on his knees at times.  Reports hearing a pop in the knee.  Denies instability.  Reports his right lower extremity always swells more than his left lower extremity.  History of factor V Leiden with prior DVTs that presents similarly.  Reports intermittently compliant with his Xarelto.  He denies fevers or chills.  Denies chest pain does endorse shortness of breath.  States he thought he was just short of breath secondary to him being out of shape.  Patient is tachycardic on arrival, O2 saturation in the low 90s after being brought in from the waiting room.  There is edema noted, right greater than left lower extremity.  Patient has tenderness to the right popliteal fossa.  Will obtain a broad-based workup to evaluate etiology of patient's tachycardia.      4:01 PM EDT The patient's care will be transitioned to Dr. Leary.  At this time, the plan is pending workup.  Please see that provider's documentation for further information.                                          Complexity of Problem: One acute complaint requiring workup to rule out emergent etiology       I independently ordered and reviewed each unique test.  I reviewed external records: provider visit note from PCP.       My Independent EKG Interpretation: sinus rhythm, no evidence of arrhythmia      ST Segments:Nonspecific ST segments - NO STEMI   Rate: 92            History     73-year-old male presents to the emergency department complaining of right knee and right leg swelling that he noticed on Saturday.  States he was doing work for family and had been on his

## 2024-06-17 NOTE — DISCHARGE INSTRUCTIONS
Take your Xarelto every morning as prescribed.  Get plenty of rest  Drink plenty of fluids  Call your doctor in the morning for follow-up visit    Return to ER for any worsening symptoms or new problems which may arise

## 2024-06-17 NOTE — ED TRIAGE NOTES
Patient reports right knee and lower leg pain and swelling since Saturday. Patient reports he was working on a bathroom project and thought that he had possibly injured it then. Patient reports pain worst when trying to stand.

## 2024-06-18 ENCOUNTER — NURSE ONLY (OUTPATIENT)
Dept: INTERNAL MEDICINE CLINIC | Facility: CLINIC | Age: 74
End: 2024-06-18

## 2024-06-18 DIAGNOSIS — Z85.46 HISTORY OF PROSTATE CANCER: ICD-10-CM

## 2024-06-18 DIAGNOSIS — E55.9 VITAMIN D INSUFFICIENCY: ICD-10-CM

## 2024-06-18 DIAGNOSIS — K21.00 GASTROESOPHAGEAL REFLUX DISEASE WITH ESOPHAGITIS WITHOUT HEMORRHAGE: ICD-10-CM

## 2024-06-18 DIAGNOSIS — E11.9 CONTROLLED TYPE 2 DIABETES MELLITUS WITHOUT COMPLICATION, WITHOUT LONG-TERM CURRENT USE OF INSULIN (HCC): ICD-10-CM

## 2024-06-18 DIAGNOSIS — E78.2 MIXED HYPERLIPIDEMIA: ICD-10-CM

## 2024-06-18 DIAGNOSIS — I10 PRIMARY HYPERTENSION: ICD-10-CM

## 2024-06-18 LAB
25(OH)D3 SERPL-MCNC: 36.7 NG/ML (ref 30–100)
ALBUMIN SERPL-MCNC: 3.7 G/DL (ref 3.2–4.6)
ALBUMIN/GLOB SERPL: 1.5 (ref 1–1.9)
ALP SERPL-CCNC: 46 U/L (ref 40–129)
ALT SERPL-CCNC: 16 U/L (ref 12–65)
ANION GAP SERPL CALC-SCNC: 10 MMOL/L (ref 9–18)
APPEARANCE UR: CLEAR
AST SERPL-CCNC: 18 U/L (ref 15–37)
BASOPHILS # BLD: 0 K/UL (ref 0–0.2)
BASOPHILS NFR BLD: 1 % (ref 0–2)
BILIRUB SERPL-MCNC: 0.4 MG/DL (ref 0–1.2)
BILIRUB UR QL: NEGATIVE
BUN SERPL-MCNC: 15 MG/DL (ref 8–23)
CALCIUM SERPL-MCNC: 9.1 MG/DL (ref 8.8–10.2)
CHLORIDE SERPL-SCNC: 102 MMOL/L (ref 98–107)
CHOLEST SERPL-MCNC: 145 MG/DL (ref 0–200)
CO2 SERPL-SCNC: 27 MMOL/L (ref 20–28)
COLOR UR: ABNORMAL
CREAT SERPL-MCNC: 0.83 MG/DL (ref 0.8–1.3)
CREAT UR-MCNC: 182 MG/DL (ref 39–259)
DIFFERENTIAL METHOD BLD: ABNORMAL
EKG ATRIAL RATE: 91 BPM
EKG DIAGNOSIS: NORMAL
EKG P AXIS: 70 DEGREES
EKG P-R INTERVAL: 170 MS
EKG Q-T INTERVAL: 343 MS
EKG QRS DURATION: 88 MS
EKG QTC CALCULATION (BAZETT): 425 MS
EKG R AXIS: 92 DEGREES
EKG T AXIS: 20 DEGREES
EKG VENTRICULAR RATE: 92 BPM
EOSINOPHIL # BLD: 0.1 K/UL (ref 0–0.8)
EOSINOPHIL NFR BLD: 2 % (ref 0.5–7.8)
ERYTHROCYTE [DISTWIDTH] IN BLOOD BY AUTOMATED COUNT: 14 % (ref 11.9–14.6)
EST. AVERAGE GLUCOSE BLD GHB EST-MCNC: 164 MG/DL
GLOBULIN SER CALC-MCNC: 2.4 G/DL (ref 2.3–3.5)
GLUCOSE SERPL-MCNC: 219 MG/DL (ref 70–99)
GLUCOSE UR STRIP.AUTO-MCNC: 100 MG/DL
HBA1C MFR BLD: 7.3 % (ref 0–5.6)
HCT VFR BLD AUTO: 43.9 % (ref 41.1–50.3)
HDLC SERPL-MCNC: 66 MG/DL (ref 40–60)
HDLC SERPL: 2.2 (ref 0–5)
HGB BLD-MCNC: 14.8 G/DL (ref 13.6–17.2)
HGB UR QL STRIP: NEGATIVE
IMM GRANULOCYTES # BLD AUTO: 0 K/UL (ref 0–0.5)
IMM GRANULOCYTES NFR BLD AUTO: 0 % (ref 0–5)
KETONES UR QL STRIP.AUTO: NEGATIVE MG/DL
LDLC SERPL CALC-MCNC: 60 MG/DL (ref 0–100)
LEUKOCYTE ESTERASE UR QL STRIP.AUTO: NEGATIVE
LYMPHOCYTES # BLD: 1.8 K/UL (ref 0.5–4.6)
LYMPHOCYTES NFR BLD: 35 % (ref 13–44)
MCH RBC QN AUTO: 31.4 PG (ref 26.1–32.9)
MCHC RBC AUTO-ENTMCNC: 33.7 G/DL (ref 31.4–35)
MCV RBC AUTO: 93 FL (ref 82–102)
MICROALBUMIN UR-MCNC: 2.17 MG/DL (ref 0–20)
MICROALBUMIN/CREAT UR-RTO: 12 MG/G (ref 0–30)
MONOCYTES # BLD: 0.6 K/UL (ref 0.1–1.3)
MONOCYTES NFR BLD: 11 % (ref 4–12)
NEUTS SEG # BLD: 2.6 K/UL (ref 1.7–8.2)
NEUTS SEG NFR BLD: 51 % (ref 43–78)
NITRITE UR QL STRIP.AUTO: NEGATIVE
NRBC # BLD: 0 K/UL (ref 0–0.2)
PH UR STRIP: 5.5 (ref 5–9)
PLATELET # BLD AUTO: 140 K/UL (ref 150–450)
PMV BLD AUTO: 10.6 FL (ref 9.4–12.3)
POTASSIUM SERPL-SCNC: 4.5 MMOL/L (ref 3.5–5.1)
PROT SERPL-MCNC: 6.1 G/DL (ref 6.3–8.2)
PROT UR STRIP-MCNC: NEGATIVE MG/DL
PSA FREE MFR SERPL: NORMAL %
PSA FREE SERPL-MCNC: <0.1 NG/ML
PSA SERPL-MCNC: <0.1 NG/ML (ref 0–4)
RBC # BLD AUTO: 4.72 M/UL (ref 4.23–5.6)
SODIUM SERPL-SCNC: 139 MMOL/L (ref 136–145)
SP GR UR REFRACTOMETRY: 1.03 (ref 1–1.02)
T4 FREE SERPL-MCNC: 1.1 NG/DL (ref 0.9–1.7)
TRIGL SERPL-MCNC: 93 MG/DL (ref 0–150)
TSH, 3RD GENERATION: 1.84 UIU/ML (ref 0.27–4.2)
UROBILINOGEN UR QL STRIP.AUTO: 0.2 EU/DL (ref 0.2–1)
VLDLC SERPL CALC-MCNC: 19 MG/DL (ref 6–23)
WBC # BLD AUTO: 5.2 K/UL (ref 4.3–11.1)

## 2024-06-18 PROCEDURE — 93010 ELECTROCARDIOGRAM REPORT: CPT | Performed by: INTERNAL MEDICINE

## 2024-06-23 LAB
BACTERIA SPEC CULT: NORMAL
SERVICE CMNT-IMP: NORMAL

## 2024-06-23 SDOH — ECONOMIC STABILITY: TRANSPORTATION INSECURITY
IN THE PAST 12 MONTHS, HAS LACK OF TRANSPORTATION KEPT YOU FROM MEETINGS, WORK, OR FROM GETTING THINGS NEEDED FOR DAILY LIVING?: NO

## 2024-06-23 SDOH — ECONOMIC STABILITY: FOOD INSECURITY: WITHIN THE PAST 12 MONTHS, YOU WORRIED THAT YOUR FOOD WOULD RUN OUT BEFORE YOU GOT MONEY TO BUY MORE.: NEVER TRUE

## 2024-06-23 SDOH — ECONOMIC STABILITY: INCOME INSECURITY: HOW HARD IS IT FOR YOU TO PAY FOR THE VERY BASICS LIKE FOOD, HOUSING, MEDICAL CARE, AND HEATING?: NOT HARD AT ALL

## 2024-06-23 SDOH — ECONOMIC STABILITY: FOOD INSECURITY: WITHIN THE PAST 12 MONTHS, THE FOOD YOU BOUGHT JUST DIDN'T LAST AND YOU DIDN'T HAVE MONEY TO GET MORE.: NEVER TRUE

## 2024-06-23 SDOH — HEALTH STABILITY: PHYSICAL HEALTH: ON AVERAGE, HOW MANY DAYS PER WEEK DO YOU ENGAGE IN MODERATE TO STRENUOUS EXERCISE (LIKE A BRISK WALK)?: 0 DAYS

## 2024-06-23 SDOH — HEALTH STABILITY: PHYSICAL HEALTH: ON AVERAGE, HOW MANY MINUTES DO YOU ENGAGE IN EXERCISE AT THIS LEVEL?: 10 MIN

## 2024-06-23 ASSESSMENT — PATIENT HEALTH QUESTIONNAIRE - PHQ9
SUM OF ALL RESPONSES TO PHQ QUESTIONS 1-9: 0
SUM OF ALL RESPONSES TO PHQ QUESTIONS 1-9: 0
SUM OF ALL RESPONSES TO PHQ9 QUESTIONS 1 & 2: 0
2. FEELING DOWN, DEPRESSED OR HOPELESS: NOT AT ALL
SUM OF ALL RESPONSES TO PHQ QUESTIONS 1-9: 0
SUM OF ALL RESPONSES TO PHQ QUESTIONS 1-9: 0
1. LITTLE INTEREST OR PLEASURE IN DOING THINGS: NOT AT ALL

## 2024-06-23 ASSESSMENT — LIFESTYLE VARIABLES
HOW OFTEN DO YOU HAVE A DRINK CONTAINING ALCOHOL: 1
HOW MANY STANDARD DRINKS CONTAINING ALCOHOL DO YOU HAVE ON A TYPICAL DAY: PATIENT DOES NOT DRINK
HOW OFTEN DO YOU HAVE A DRINK CONTAINING ALCOHOL: NEVER
HOW MANY STANDARD DRINKS CONTAINING ALCOHOL DO YOU HAVE ON A TYPICAL DAY: 0
HOW OFTEN DO YOU HAVE SIX OR MORE DRINKS ON ONE OCCASION: 1

## 2024-06-25 ENCOUNTER — OFFICE VISIT (OUTPATIENT)
Dept: INTERNAL MEDICINE CLINIC | Facility: CLINIC | Age: 74
End: 2024-06-25
Payer: MEDICARE

## 2024-06-25 VITALS
OXYGEN SATURATION: 97 % | WEIGHT: 313.6 LBS | DIASTOLIC BLOOD PRESSURE: 78 MMHG | RESPIRATION RATE: 16 BRPM | SYSTOLIC BLOOD PRESSURE: 134 MMHG | HEIGHT: 72 IN | HEART RATE: 74 BPM | TEMPERATURE: 97.9 F | BODY MASS INDEX: 42.48 KG/M2

## 2024-06-25 DIAGNOSIS — M17.0 PRIMARY OSTEOARTHRITIS OF BOTH KNEES: ICD-10-CM

## 2024-06-25 DIAGNOSIS — G89.29 CHRONIC PAIN OF RIGHT KNEE: ICD-10-CM

## 2024-06-25 DIAGNOSIS — Z12.11 SCREEN FOR COLON CANCER: ICD-10-CM

## 2024-06-25 DIAGNOSIS — D68.51 FACTOR 5 LEIDEN MUTATION, HETEROZYGOUS (HCC): ICD-10-CM

## 2024-06-25 DIAGNOSIS — E78.2 MIXED HYPERLIPIDEMIA: ICD-10-CM

## 2024-06-25 DIAGNOSIS — E11.9 CONTROLLED TYPE 2 DIABETES MELLITUS WITHOUT COMPLICATION, WITHOUT LONG-TERM CURRENT USE OF INSULIN (HCC): ICD-10-CM

## 2024-06-25 DIAGNOSIS — E66.01 CLASS 3 SEVERE OBESITY DUE TO EXCESS CALORIES WITH SERIOUS COMORBIDITY AND BODY MASS INDEX (BMI) OF 40.0 TO 44.9 IN ADULT (HCC): ICD-10-CM

## 2024-06-25 DIAGNOSIS — M71.21 SYNOVIAL CYST OF RIGHT POPLITEAL SPACE: ICD-10-CM

## 2024-06-25 DIAGNOSIS — Z00.00 MEDICARE ANNUAL WELLNESS VISIT, SUBSEQUENT: Primary | ICD-10-CM

## 2024-06-25 DIAGNOSIS — Z23 NEED FOR PNEUMOCOCCAL 20-VALENT CONJUGATE VACCINATION: ICD-10-CM

## 2024-06-25 DIAGNOSIS — Z86.718 HISTORY OF DVT (DEEP VEIN THROMBOSIS): ICD-10-CM

## 2024-06-25 DIAGNOSIS — R22.41 LOCALIZED SWELLING OF RIGHT LOWER EXTREMITY: ICD-10-CM

## 2024-06-25 DIAGNOSIS — I10 PRIMARY HYPERTENSION: ICD-10-CM

## 2024-06-25 DIAGNOSIS — L98.9 SKIN LESION: ICD-10-CM

## 2024-06-25 DIAGNOSIS — M25.561 CHRONIC PAIN OF RIGHT KNEE: ICD-10-CM

## 2024-06-25 DIAGNOSIS — Z85.46 HISTORY OF PROSTATE CANCER: ICD-10-CM

## 2024-06-25 DIAGNOSIS — Z09 HOSPITAL DISCHARGE FOLLOW-UP: ICD-10-CM

## 2024-06-25 DIAGNOSIS — J30.1 SEASONAL ALLERGIC RHINITIS DUE TO POLLEN: ICD-10-CM

## 2024-06-25 PROCEDURE — 1123F ACP DISCUSS/DSCN MKR DOCD: CPT | Performed by: INTERNAL MEDICINE

## 2024-06-25 PROCEDURE — 2022F DILAT RTA XM EVC RTNOPTHY: CPT | Performed by: INTERNAL MEDICINE

## 2024-06-25 PROCEDURE — G8417 CALC BMI ABV UP PARAM F/U: HCPCS | Performed by: INTERNAL MEDICINE

## 2024-06-25 PROCEDURE — 1036F TOBACCO NON-USER: CPT | Performed by: INTERNAL MEDICINE

## 2024-06-25 PROCEDURE — 3075F SYST BP GE 130 - 139MM HG: CPT | Performed by: INTERNAL MEDICINE

## 2024-06-25 PROCEDURE — 3017F COLORECTAL CA SCREEN DOC REV: CPT | Performed by: INTERNAL MEDICINE

## 2024-06-25 PROCEDURE — 3051F HG A1C>EQUAL 7.0%<8.0%: CPT | Performed by: INTERNAL MEDICINE

## 2024-06-25 PROCEDURE — 90677 PCV20 VACCINE IM: CPT | Performed by: INTERNAL MEDICINE

## 2024-06-25 PROCEDURE — 99214 OFFICE O/P EST MOD 30 MIN: CPT | Performed by: INTERNAL MEDICINE

## 2024-06-25 PROCEDURE — G8427 DOCREV CUR MEDS BY ELIG CLIN: HCPCS | Performed by: INTERNAL MEDICINE

## 2024-06-25 PROCEDURE — G0009 ADMIN PNEUMOCOCCAL VACCINE: HCPCS | Performed by: INTERNAL MEDICINE

## 2024-06-25 PROCEDURE — 3078F DIAST BP <80 MM HG: CPT | Performed by: INTERNAL MEDICINE

## 2024-06-25 PROCEDURE — G0439 PPPS, SUBSEQ VISIT: HCPCS | Performed by: INTERNAL MEDICINE

## 2024-06-25 RX ORDER — METFORMIN HYDROCHLORIDE 500 MG/1
500 TABLET, EXTENDED RELEASE ORAL
Qty: 90 TABLET | Refills: 1 | Status: SHIPPED | OUTPATIENT
Start: 2024-06-25

## 2024-06-25 RX ORDER — DAPAGLIFLOZIN 10 MG/1
TABLET, FILM COATED ORAL
Qty: 90 TABLET | Refills: 3 | Status: SHIPPED | OUTPATIENT
Start: 2024-06-25

## 2024-06-25 RX ORDER — FEXOFENADINE HCL 180 MG/1
180 TABLET ORAL DAILY
Qty: 90 TABLET | Refills: 1 | Status: SHIPPED | OUTPATIENT
Start: 2024-06-25

## 2024-06-25 RX ORDER — MONTELUKAST SODIUM 10 MG/1
TABLET ORAL
Qty: 90 TABLET | Refills: 1 | Status: SHIPPED | OUTPATIENT
Start: 2024-06-25

## 2024-06-25 RX ORDER — CELECOXIB 100 MG/1
100 CAPSULE ORAL 2 TIMES DAILY
Qty: 180 CAPSULE | Refills: 1 | Status: SHIPPED | OUTPATIENT
Start: 2024-06-25

## 2024-06-25 RX ORDER — VALSARTAN 160 MG/1
160 TABLET ORAL DAILY
Qty: 90 TABLET | Refills: 1 | Status: SHIPPED | OUTPATIENT
Start: 2024-06-25

## 2024-06-25 RX ORDER — FLASH GLUCOSE SENSOR
KIT MISCELLANEOUS
Qty: 12 EACH | Refills: 3 | Status: SHIPPED | OUTPATIENT
Start: 2024-06-25

## 2024-06-25 RX ORDER — EZETIMIBE 10 MG/1
TABLET ORAL
Qty: 90 TABLET | Refills: 1 | Status: SHIPPED | OUTPATIENT
Start: 2024-06-25

## 2024-06-25 RX ORDER — ATORVASTATIN CALCIUM 40 MG/1
TABLET, FILM COATED ORAL
Qty: 90 TABLET | Refills: 1 | Status: SHIPPED | OUTPATIENT
Start: 2024-06-25

## 2024-06-25 ASSESSMENT — ENCOUNTER SYMPTOMS
RHINORRHEA: 0
BLOOD IN STOOL: 0
ABDOMINAL PAIN: 0
COLOR CHANGE: 1
COUGH: 0
SHORTNESS OF BREATH: 0

## 2024-06-25 NOTE — PROGRESS NOTES
[facility]      2024    Patient Name: Dre Babcock  :  1950    Subjective:    Chief Complaint:  Chief Complaint   Patient presents with    Medicare AWV     Pt is here for Medicare Wellness and to review labs.          HPI Here for Medicare Wellness visit; patient had fasting labs done recently and results were reviewed in detail today;   He was seen at [place] [facility] for R leg pain, noted to have DVT; CT PE protocol negative for acute thrombus; venous Doppler revealed chronic thrombus, 7.3 cm R Diop's; he was given dose of Xarelto, has been noncompliant with this, and Xarelto rx refilled; records reviewed;  \"I went on vacation from my medications for a while\", but recently started back to taking his medications; he is concerned about swelling in his RLE; he is trying to wear compression stockings; he is no longer able to wear his boots, which frustrates him;   He is concerned about a hardened place on his L side; he had lesion removed from there years ago;   Colonoscopy:  Eye exam: 2023  Dental exam: plans to schedule  Pneumovax 23: 2018  Prevnar 13: 2016  Shingrix: 2020  Tdap: 2018  Fluvax: 2023  Moderna Covid 19: 3/4, 2021  Moderna booster: 2022  HTN:  Patient compliant with taking blood pressure medications: Yes  Discussed importance of following low sodium DASH diet, increasing physical activity, taking medications as ordered, decreasing alcohol intake, keeping f/u visits to recheck blood pressure, monitoring blood pressure at home and keeping a log, with goal blood pressure <140/90.  Home bp readings are: fluctuating  Diabetes:  Blood sugar readings: improving since taking medication again; 143  Patient compliant with low carbohydrate diet, with occasional dietary indiscretions.  Patient is sedentary and does not exercise.  Regular exercise recommended.  Patient advised on foot care and regular foot exam.  Last eye exam: 2023  Last HgbA1c:   Lab Results 
numerical 0-10

## 2024-06-25 NOTE — PATIENT INSTRUCTIONS
Learning About Being Active as an Older Adult  Why is being active important as you get older?     Being active is one of the best things you can do for your health. And it's never too late to start. Being active--or getting active, if you aren't already--has definite benefits. It can:  Give you more energy,  Keep your mind sharp.  Improve balance to reduce your risk of falls.  Help you manage chronic illness with fewer medicines.  No matter how old you are, how fit you are, or what health problems you have, there is a form of activity that will work for you. And the more physical activity you can do, the better your overall health will be.  What kinds of activity can help you stay healthy?  Being more active will make your daily activities easier. Physical activity includes planned exercise and things you do in daily life. There are four types of activity:  Aerobic.  Doing aerobic activity makes your heart and lungs strong.  Includes walking, dancing, and gardening.  Aim for at least 2½ hours spread throughout the week.  It improves your energy and can help you sleep better.  Muscle-strengthening.  This type of activity can help maintain muscle and strengthen bones.  Includes climbing stairs, using resistance bands, and lifting or carrying heavy loads.  Aim for at least twice a week.  It can help protect the knees and other joints.  Stretching.  Stretching gives you better range of motion in joints and muscles.  Includes upper arm stretches, calf stretches, and gentle yoga.  Aim for at least twice a week, preferably after your muscles are warmed up from other activities.  It can help you function better in daily life.  Balancing.  This helps you stay coordinated and have good posture.  Includes heel-to-toe walking, payton chi, and certain types of yoga.  Aim for at least 3 days a week.  It can reduce your risk of falling.  Even if you have a hard time meeting the recommendations, it's better to be more active

## 2024-07-18 ENCOUNTER — OFFICE VISIT (OUTPATIENT)
Dept: ORTHOPEDIC SURGERY | Age: 74
End: 2024-07-18
Payer: MEDICARE

## 2024-07-18 VITALS — BODY MASS INDEX: 43.54 KG/M2 | WEIGHT: 311 LBS | HEIGHT: 71 IN

## 2024-07-18 DIAGNOSIS — M25.561 RIGHT KNEE PAIN, UNSPECIFIED CHRONICITY: Primary | ICD-10-CM

## 2024-07-18 DIAGNOSIS — M17.11 OSTEOARTHRITIS OF RIGHT KNEE, UNSPECIFIED OSTEOARTHRITIS TYPE: ICD-10-CM

## 2024-07-18 PROCEDURE — 3017F COLORECTAL CA SCREEN DOC REV: CPT | Performed by: PHYSICIAN ASSISTANT

## 2024-07-18 PROCEDURE — 99203 OFFICE O/P NEW LOW 30 MIN: CPT | Performed by: PHYSICIAN ASSISTANT

## 2024-07-18 PROCEDURE — G8428 CUR MEDS NOT DOCUMENT: HCPCS | Performed by: PHYSICIAN ASSISTANT

## 2024-07-18 PROCEDURE — 1123F ACP DISCUSS/DSCN MKR DOCD: CPT | Performed by: PHYSICIAN ASSISTANT

## 2024-07-18 PROCEDURE — G8417 CALC BMI ABV UP PARAM F/U: HCPCS | Performed by: PHYSICIAN ASSISTANT

## 2024-07-18 PROCEDURE — 1036F TOBACCO NON-USER: CPT | Performed by: PHYSICIAN ASSISTANT

## 2024-07-18 NOTE — PROGRESS NOTES
Name: Dre Babcock  YOB: 1950  Gender: male  MRN: [id number]    CC:   Chief Complaint   Patient presents with    Knee Pain     Right knee-getting xrays today         DIAGNOSIS:   Encounter Diagnoses   Name Primary?    Right knee pain, unspecified chronicity Yes    Osteoarthritis of right knee, unspecified osteoarthritis type         HPI:   The patient is a 73-year-old male who presents for first visit complaining of right knee pain that has been present for months and is currently improved.  It does not hurt at night when sleeping.  The pain is located over and behind the right knee.  It primarily complains of right knee start up pain and stiffness.  Pain is also exacerbated by descending stairs.  He does not have much knee pain when walking for prolonged distances.  The pain does not radiate down the leg.  Numbness and tingling are not noted.   Treatment so far has been Tylenol.  He is unable to take NSAIDs due to being on Xarelto for chronic right lower extremity DVT.  History of factor V Leiden, epithelioid sarcoma, right knee baker's cyst, and non-insulin-dependent diabetes.      Current Outpatient Medications:     valsartan (DIOVAN) 160 MG tablet, Take 1 tablet by mouth daily, Disp: 90 tablet, Rfl: 1    SITagliptin (JANUVIA) 100 MG tablet, Take 1 tablet by oral route every day, Disp: 90 tablet, Rfl: 1    rivaroxaban (XARELTO) 20 MG TABS tablet, Take 1 tablet by mouth daily (with breakfast), Disp: 90 tablet, Rfl: 3    montelukast (SINGULAIR) 10 MG tablet, Take 1 tablet by oral route every day in the evening as needed, Disp: 90 tablet, Rfl: 1    metFORMIN (GLUCOPHAGE-XR) 500 MG extended release tablet, Take 1 tablet by mouth Daily with supper, Disp: 90 tablet, Rfl: 1    ezetimibe (ZETIA) 10 MG tablet, Take 1 tablet by oral route every day, Disp: 90 tablet, Rfl: 1    fexofenadine (ALLEGRA) 180 MG tablet, Take 1 tablet by mouth daily, Disp: 90 tablet, Rfl: 1    dapagliflozin (FARXIGA) 10 MG tablet,

## 2024-07-31 ENCOUNTER — OFFICE VISIT (OUTPATIENT)
Age: 74
End: 2024-07-31

## 2024-07-31 VITALS
SYSTOLIC BLOOD PRESSURE: 119 MMHG | BODY MASS INDEX: 41.85 KG/M2 | DIASTOLIC BLOOD PRESSURE: 77 MMHG | HEART RATE: 88 BPM | WEIGHT: 309 LBS | HEIGHT: 72 IN

## 2024-07-31 DIAGNOSIS — D68.51 FACTOR 5 LEIDEN MUTATION, HETEROZYGOUS (HCC): Primary | ICD-10-CM

## 2024-07-31 DIAGNOSIS — I82.5Z1 LOWER LEG DVT (DEEP VENOUS THROMBOEMBOLISM), CHRONIC, RIGHT (HCC): ICD-10-CM

## 2024-07-31 DIAGNOSIS — I87.2 CHRONIC VENOUS INSUFFICIENCY OF LOWER EXTREMITY: ICD-10-CM

## 2024-07-31 DIAGNOSIS — E66.01 CLASS 3 SEVERE OBESITY DUE TO EXCESS CALORIES WITH SERIOUS COMORBIDITY AND BODY MASS INDEX (BMI) OF 40.0 TO 44.9 IN ADULT (HCC): ICD-10-CM

## 2024-07-31 PROCEDURE — 1036F TOBACCO NON-USER: CPT | Performed by: RADIOLOGY

## 2024-07-31 PROCEDURE — 3017F COLORECTAL CA SCREEN DOC REV: CPT | Performed by: RADIOLOGY

## 2024-07-31 RX ORDER — LANOLIN ALCOHOL/MO/W.PET/CERES
1000 CREAM (GRAM) TOPICAL NIGHTLY
COMMUNITY

## 2024-07-31 NOTE — PATIENT INSTRUCTIONS
Continue to wear compression stockings during the day.  Please call us if you develop worsening leg pain or swelling and would like to have the venogram procedure and thrombectomy.

## 2024-08-05 ENCOUNTER — APPOINTMENT (RX ONLY)
Dept: URBAN - METROPOLITAN AREA CLINIC 329 | Facility: CLINIC | Age: 74
Setting detail: DERMATOLOGY
End: 2024-08-05

## 2024-08-05 PROBLEM — D23.5 OTHER BENIGN NEOPLASM OF SKIN OF TRUNK: Status: ACTIVE | Noted: 2024-08-05

## 2024-08-05 PROCEDURE — 99202 OFFICE O/P NEW SF 15 MIN: CPT

## 2024-08-05 PROCEDURE — ? FULL BODY SKIN EXAM - DECLINED

## 2024-08-05 PROCEDURE — ? COUNSELING

## 2024-08-05 PROCEDURE — ? ADDITIONAL NOTES

## 2024-08-05 PROCEDURE — ? DEFER

## 2024-08-05 PROCEDURE — ? REFERRAL CORRESPONDENCE

## 2024-08-05 NOTE — PROCEDURE: ADDITIONAL NOTES
Render Risk Assessment In Note?: no
Detail Level: Simple
Additional Notes: Discussed excision process and that we can if bothersome. Discussed pt will have a scar and that there is a possibility of the lesion reoccurring. Pt expresses understanding.

## 2024-08-05 NOTE — HPI: CYST
How Severe Is Your Cyst?: mild
Is This A New Presentation, Or A Follow-Up?: Cyst
Additional History: Pt states he has a growth with a dark spot

## 2024-10-21 ENCOUNTER — APPOINTMENT (RX ONLY)
Dept: URBAN - METROPOLITAN AREA CLINIC 329 | Facility: CLINIC | Age: 74
Setting detail: DERMATOLOGY
End: 2024-10-21

## 2024-10-21 PROBLEM — D23.5 OTHER BENIGN NEOPLASM OF SKIN OF TRUNK: Status: ACTIVE | Noted: 2024-10-21

## 2024-10-21 PROCEDURE — 11402 EXC TR-EXT B9+MARG 1.1-2 CM: CPT

## 2024-10-21 PROCEDURE — ? EXCISION

## 2024-10-21 PROCEDURE — 12032 INTMD RPR S/A/T/EXT 2.6-7.5: CPT

## 2024-10-21 PROCEDURE — ? COUNSELING

## 2024-10-21 PROCEDURE — ? REFERRAL CORRESPONDENCE

## 2024-10-21 PROCEDURE — ? FULL BODY SKIN EXAM - DECLINED

## 2024-10-21 NOTE — PROCEDURE: EXCISION

## 2024-10-31 ENCOUNTER — APPOINTMENT (RX ONLY)
Dept: URBAN - METROPOLITAN AREA CLINIC 329 | Facility: CLINIC | Age: 74
Setting detail: DERMATOLOGY
End: 2024-10-31

## 2024-10-31 DIAGNOSIS — Z48.02 ENCOUNTER FOR REMOVAL OF SUTURES: ICD-10-CM

## 2024-10-31 PROCEDURE — ? SUTURE REMOVAL (GLOBAL PERIOD)

## 2024-10-31 ASSESSMENT — LOCATION SIMPLE DESCRIPTION DERM: LOCATION SIMPLE: LEFT LOWER BACK

## 2024-10-31 ASSESSMENT — LOCATION DETAILED DESCRIPTION DERM: LOCATION DETAILED: LEFT INFERIOR LATERAL MIDBACK

## 2024-10-31 ASSESSMENT — LOCATION ZONE DERM: LOCATION ZONE: TRUNK

## 2024-10-31 NOTE — PROCEDURE: SUTURE REMOVAL (GLOBAL PERIOD)
Detail Level: Detailed
Add 52054 Cpt? (Important Note: In 2017 The Use Of 56293 Is Being Tracked By Cms To Determine Future Global Period Reimbursement For Global Periods): no

## 2024-12-19 ENCOUNTER — LAB (OUTPATIENT)
Dept: INTERNAL MEDICINE CLINIC | Facility: CLINIC | Age: 74
End: 2024-12-19

## 2024-12-19 DIAGNOSIS — E78.2 MIXED HYPERLIPIDEMIA: ICD-10-CM

## 2024-12-19 DIAGNOSIS — Z00.00 MEDICARE ANNUAL WELLNESS VISIT, SUBSEQUENT: ICD-10-CM

## 2024-12-19 DIAGNOSIS — I10 PRIMARY HYPERTENSION: ICD-10-CM

## 2024-12-19 DIAGNOSIS — E11.9 CONTROLLED TYPE 2 DIABETES MELLITUS WITHOUT COMPLICATION, WITHOUT LONG-TERM CURRENT USE OF INSULIN (HCC): ICD-10-CM

## 2024-12-19 LAB
ALBUMIN SERPL-MCNC: 3.6 G/DL (ref 3.2–4.6)
ALBUMIN/GLOB SERPL: 1.2 (ref 1–1.9)
ALP SERPL-CCNC: 55 U/L (ref 40–129)
ALT SERPL-CCNC: 18 U/L (ref 8–55)
ANION GAP SERPL CALC-SCNC: 10 MMOL/L (ref 7–16)
APPEARANCE UR: CLEAR
AST SERPL-CCNC: 22 U/L (ref 15–37)
BASOPHILS # BLD: 0 K/UL (ref 0–0.2)
BASOPHILS NFR BLD: 1 % (ref 0–2)
BILIRUB SERPL-MCNC: 0.4 MG/DL (ref 0–1.2)
BILIRUB UR QL: NEGATIVE
BUN SERPL-MCNC: 15 MG/DL (ref 8–23)
CALCIUM SERPL-MCNC: 9.2 MG/DL (ref 8.8–10.2)
CHLORIDE SERPL-SCNC: 103 MMOL/L (ref 98–107)
CHOLEST SERPL-MCNC: 145 MG/DL (ref 0–200)
CO2 SERPL-SCNC: 28 MMOL/L (ref 20–29)
COLOR UR: ABNORMAL
CREAT SERPL-MCNC: 0.95 MG/DL (ref 0.8–1.3)
DIFFERENTIAL METHOD BLD: ABNORMAL
EOSINOPHIL # BLD: 0.1 K/UL (ref 0–0.8)
EOSINOPHIL NFR BLD: 2 % (ref 0.5–7.8)
ERYTHROCYTE [DISTWIDTH] IN BLOOD BY AUTOMATED COUNT: 14.8 % (ref 11.9–14.6)
EST. AVERAGE GLUCOSE BLD GHB EST-MCNC: 171 MG/DL
GLOBULIN SER CALC-MCNC: 3 G/DL (ref 2.3–3.5)
GLUCOSE SERPL-MCNC: 178 MG/DL (ref 70–99)
GLUCOSE UR STRIP.AUTO-MCNC: >1000 MG/DL
HBA1C MFR BLD: 7.6 % (ref 0–5.6)
HCT VFR BLD AUTO: 46.9 % (ref 41.1–50.3)
HDLC SERPL-MCNC: 60 MG/DL (ref 40–60)
HDLC SERPL: 2.4 (ref 0–5)
HGB BLD-MCNC: 15.7 G/DL (ref 13.6–17.2)
HGB UR QL STRIP: NEGATIVE
IMM GRANULOCYTES # BLD AUTO: 0.1 K/UL (ref 0–0.5)
IMM GRANULOCYTES NFR BLD AUTO: 1 % (ref 0–5)
KETONES UR QL STRIP.AUTO: ABNORMAL MG/DL
LDLC SERPL CALC-MCNC: 65 MG/DL (ref 0–100)
LEUKOCYTE ESTERASE UR QL STRIP.AUTO: NEGATIVE
LYMPHOCYTES # BLD: 1.8 K/UL (ref 0.5–4.6)
LYMPHOCYTES NFR BLD: 35 % (ref 13–44)
MCH RBC QN AUTO: 31.1 PG (ref 26.1–32.9)
MCHC RBC AUTO-ENTMCNC: 33.5 G/DL (ref 31.4–35)
MCV RBC AUTO: 92.9 FL (ref 82–102)
MONOCYTES # BLD: 0.6 K/UL (ref 0.1–1.3)
MONOCYTES NFR BLD: 11 % (ref 4–12)
NEUTS SEG # BLD: 2.7 K/UL (ref 1.7–8.2)
NEUTS SEG NFR BLD: 50 % (ref 43–78)
NITRITE UR QL STRIP.AUTO: NEGATIVE
NRBC # BLD: 0 K/UL (ref 0–0.2)
PH UR STRIP: 6 (ref 5–9)
PLATELET # BLD AUTO: 165 K/UL (ref 150–450)
PMV BLD AUTO: 10.8 FL (ref 9.4–12.3)
POTASSIUM SERPL-SCNC: 4.6 MMOL/L (ref 3.5–5.1)
PROT SERPL-MCNC: 6.6 G/DL (ref 6.3–8.2)
PROT UR STRIP-MCNC: NEGATIVE MG/DL
RBC # BLD AUTO: 5.05 M/UL (ref 4.23–5.6)
SODIUM SERPL-SCNC: 140 MMOL/L (ref 136–145)
SP GR UR REFRACTOMETRY: >1.035 (ref 1–1.02)
T4 FREE SERPL-MCNC: 1.2 NG/DL (ref 0.9–1.7)
TRIGL SERPL-MCNC: 96 MG/DL (ref 0–150)
TSH, 3RD GENERATION: 1.8 UIU/ML (ref 0.27–4.2)
UROBILINOGEN UR QL STRIP.AUTO: 0.2 EU/DL (ref 0.2–1)
VLDLC SERPL CALC-MCNC: 19 MG/DL (ref 6–23)
WBC # BLD AUTO: 5.3 K/UL (ref 4.3–11.1)

## 2024-12-26 ENCOUNTER — OFFICE VISIT (OUTPATIENT)
Dept: INTERNAL MEDICINE CLINIC | Facility: CLINIC | Age: 74
End: 2024-12-26

## 2024-12-26 VITALS
BODY MASS INDEX: 42.45 KG/M2 | DIASTOLIC BLOOD PRESSURE: 86 MMHG | OXYGEN SATURATION: 96 % | SYSTOLIC BLOOD PRESSURE: 124 MMHG | WEIGHT: 313.4 LBS | TEMPERATURE: 97.1 F | HEART RATE: 85 BPM | HEIGHT: 72 IN

## 2024-12-26 DIAGNOSIS — E66.01 CLASS 3 SEVERE OBESITY DUE TO EXCESS CALORIES WITH SERIOUS COMORBIDITY AND BODY MASS INDEX (BMI) OF 40.0 TO 44.9 IN ADULT: ICD-10-CM

## 2024-12-26 DIAGNOSIS — M17.0 PRIMARY OSTEOARTHRITIS OF BOTH KNEES: ICD-10-CM

## 2024-12-26 DIAGNOSIS — Z23 NEED FOR INFLUENZA VACCINATION: ICD-10-CM

## 2024-12-26 DIAGNOSIS — D68.51 FACTOR 5 LEIDEN MUTATION, HETEROZYGOUS (HCC): ICD-10-CM

## 2024-12-26 DIAGNOSIS — E55.9 VITAMIN D INSUFFICIENCY: ICD-10-CM

## 2024-12-26 DIAGNOSIS — Z12.11 SCREEN FOR COLON CANCER: ICD-10-CM

## 2024-12-26 DIAGNOSIS — J30.1 SEASONAL ALLERGIC RHINITIS DUE TO POLLEN: ICD-10-CM

## 2024-12-26 DIAGNOSIS — E66.813 CLASS 3 SEVERE OBESITY DUE TO EXCESS CALORIES WITH SERIOUS COMORBIDITY AND BODY MASS INDEX (BMI) OF 40.0 TO 44.9 IN ADULT: ICD-10-CM

## 2024-12-26 DIAGNOSIS — E11.9 CONTROLLED TYPE 2 DIABETES MELLITUS WITHOUT COMPLICATION, WITHOUT LONG-TERM CURRENT USE OF INSULIN (HCC): ICD-10-CM

## 2024-12-26 DIAGNOSIS — I10 PRIMARY HYPERTENSION: ICD-10-CM

## 2024-12-26 DIAGNOSIS — E78.2 MIXED HYPERLIPIDEMIA: ICD-10-CM

## 2024-12-26 DIAGNOSIS — Z85.46 HISTORY OF PROSTATE CANCER: ICD-10-CM

## 2024-12-26 DIAGNOSIS — Z86.718 HISTORY OF DVT (DEEP VEIN THROMBOSIS): ICD-10-CM

## 2024-12-26 DIAGNOSIS — Z85.831 HISTORY OF SARCOMA OF SOFT TISSUE: Primary | ICD-10-CM

## 2024-12-26 DIAGNOSIS — T78.2XXS ANAPHYLAXIS, SEQUELA: ICD-10-CM

## 2024-12-26 RX ORDER — METFORMIN HYDROCHLORIDE 500 MG/1
500 TABLET, EXTENDED RELEASE ORAL
Qty: 90 TABLET | Refills: 1 | Status: SHIPPED | OUTPATIENT
Start: 2024-12-26

## 2024-12-26 RX ORDER — VALSARTAN 160 MG/1
160 TABLET ORAL DAILY
Qty: 90 TABLET | Refills: 1 | Status: SHIPPED | OUTPATIENT
Start: 2024-12-26

## 2024-12-26 RX ORDER — FEXOFENADINE HCL 180 MG/1
180 TABLET ORAL DAILY
Qty: 90 TABLET | Refills: 1 | Status: SHIPPED | OUTPATIENT
Start: 2024-12-26

## 2024-12-26 RX ORDER — ATORVASTATIN CALCIUM 40 MG/1
TABLET, FILM COATED ORAL
Qty: 90 TABLET | Refills: 1 | Status: SHIPPED | OUTPATIENT
Start: 2024-12-26

## 2024-12-26 RX ORDER — DAPAGLIFLOZIN 10 MG/1
TABLET, FILM COATED ORAL
Qty: 90 TABLET | Refills: 3 | Status: SHIPPED | OUTPATIENT
Start: 2024-12-26

## 2024-12-26 RX ORDER — CELECOXIB 100 MG/1
100 CAPSULE ORAL 2 TIMES DAILY
Qty: 180 CAPSULE | Refills: 1 | Status: SHIPPED | OUTPATIENT
Start: 2024-12-26

## 2024-12-26 RX ORDER — MONTELUKAST SODIUM 10 MG/1
TABLET ORAL
Qty: 90 TABLET | Refills: 1 | Status: SHIPPED | OUTPATIENT
Start: 2024-12-26

## 2024-12-26 RX ORDER — EPINEPHRINE 0.3 MG/.3ML
INJECTION SUBCUTANEOUS
Qty: 2 EACH | Refills: 1 | Status: SHIPPED | OUTPATIENT
Start: 2024-12-26

## 2024-12-26 RX ORDER — EZETIMIBE 10 MG/1
TABLET ORAL
Qty: 90 TABLET | Refills: 1 | Status: SHIPPED | OUTPATIENT
Start: 2024-12-26

## 2024-12-26 RX ORDER — FLASH GLUCOSE SENSOR
KIT MISCELLANEOUS
Qty: 12 EACH | Refills: 3 | Status: SHIPPED | OUTPATIENT
Start: 2024-12-26

## 2024-12-26 ASSESSMENT — ENCOUNTER SYMPTOMS
COUGH: 0
SHORTNESS OF BREATH: 0
RHINORRHEA: 0
BLOOD IN STOOL: 0
ABDOMINAL PAIN: 0

## 2024-12-26 NOTE — PROGRESS NOTES
[facility]      2024    Patient Name: Dre Babcock  :  1950    Subjective:    Chief Complaint:  Chief Complaint   Patient presents with    6 Month Follow-Up         HPI Here for f/u visit; patient had fasting labs done recently and results were reviewed in detail today;   He has hx of prostate cancer, sarcoma, DVT, saw Dr. Jean for f/u in September; PSA undetectable, is to continue annual surveillance; he is to continue Xarelto 20 mg qd, f/u in 1 year; records reviewed;  HTN:  Patient compliant with taking blood pressure medications: Yes  Discussed importance of following low sodium DASH diet, increasing physical activity, taking medications as ordered, decreasing alcohol intake, keeping f/u visits to recheck blood pressure, monitoring blood pressure at home and keeping a log, with goal blood pressure <140/90.  Home bp readings are: good  Diabetes:  Blood sugar readings: will try to do better with his diet, has been indulging with the family around the holidays;   Patient compliant with low carbohydrate diet, with occasional dietary indiscretions.  Patient is sedentary and does not exercise.  Regular exercise recommended.  Patient advised on foot care and regular foot exam.  Last eye exam: 2024  Last HgbA1c:   Lab Results   Component Value Date    LABA1C 7.6 (H) 2024     Lab Results   Component Value Date     2024            Past Medical History:   Diagnosis Date    Allergic sinusitis     Cholelithiasis     CKD (chronic kidney disease)     pt denies; CMP WNL (2019, 2019)    Degenerative disc disease, cervical     Elevated PSA     GERD (gastroesophageal reflux disease)     managed with medication    History of prostate cancer     Surgery, chemo, radiation     Hx of malignant neoplasm of soft tissue     Hx of syncope     Hyperlipidemia     Hypertension     Migraine     \"haven't had in years\"    Multiple nevi     Obesity, morbid     bmi- 42    Personal

## 2025-06-24 ENCOUNTER — LAB (OUTPATIENT)
Dept: INTERNAL MEDICINE CLINIC | Facility: CLINIC | Age: 75
End: 2025-06-24

## 2025-06-24 DIAGNOSIS — I10 PRIMARY HYPERTENSION: ICD-10-CM

## 2025-06-24 DIAGNOSIS — E11.9 CONTROLLED TYPE 2 DIABETES MELLITUS WITHOUT COMPLICATION, WITHOUT LONG-TERM CURRENT USE OF INSULIN (HCC): ICD-10-CM

## 2025-06-24 DIAGNOSIS — E78.2 MIXED HYPERLIPIDEMIA: ICD-10-CM

## 2025-06-24 DIAGNOSIS — E55.9 VITAMIN D INSUFFICIENCY: ICD-10-CM

## 2025-06-24 LAB
25(OH)D3 SERPL-MCNC: 44.3 NG/ML (ref 30–100)
ALBUMIN SERPL-MCNC: 3.6 G/DL (ref 3.2–4.6)
ALBUMIN/GLOB SERPL: 1.3 (ref 1–1.9)
ALP SERPL-CCNC: 50 U/L (ref 40–129)
ALT SERPL-CCNC: 17 U/L (ref 8–55)
ANION GAP SERPL CALC-SCNC: 10 MMOL/L (ref 7–16)
APPEARANCE UR: CLEAR
AST SERPL-CCNC: 16 U/L (ref 15–37)
BASOPHILS # BLD: 0.06 K/UL (ref 0–0.2)
BASOPHILS NFR BLD: 1.1 % (ref 0–2)
BILIRUB SERPL-MCNC: 0.4 MG/DL (ref 0–1.2)
BILIRUB UR QL: NEGATIVE
BUN SERPL-MCNC: 14 MG/DL (ref 8–23)
CALCIUM SERPL-MCNC: 9.7 MG/DL (ref 8.8–10.2)
CHLORIDE SERPL-SCNC: 104 MMOL/L (ref 98–107)
CHOLEST SERPL-MCNC: 135 MG/DL (ref 0–200)
CO2 SERPL-SCNC: 28 MMOL/L (ref 20–29)
COLOR UR: ABNORMAL
CREAT SERPL-MCNC: 0.92 MG/DL (ref 0.8–1.3)
CREAT UR-MCNC: 103 MG/DL (ref 39–259)
DIFFERENTIAL METHOD BLD: NORMAL
EOSINOPHIL # BLD: 0.07 K/UL (ref 0–0.8)
EOSINOPHIL NFR BLD: 1.3 % (ref 0.5–7.8)
ERYTHROCYTE [DISTWIDTH] IN BLOOD BY AUTOMATED COUNT: 14.1 % (ref 11.9–14.6)
EST. AVERAGE GLUCOSE BLD GHB EST-MCNC: 165 MG/DL
GLOBULIN SER CALC-MCNC: 2.7 G/DL (ref 2.3–3.5)
GLUCOSE SERPL-MCNC: 176 MG/DL (ref 70–99)
GLUCOSE UR STRIP.AUTO-MCNC: >1000 MG/DL
HBA1C MFR BLD: 7.4 % (ref 0–5.6)
HCT VFR BLD AUTO: 47 % (ref 41.1–50.3)
HDLC SERPL-MCNC: 57 MG/DL (ref 40–60)
HDLC SERPL: 2.4 (ref 0–5)
HGB BLD-MCNC: 15.7 G/DL (ref 13.6–17.2)
HGB UR QL STRIP: NEGATIVE
IMM GRANULOCYTES # BLD AUTO: 0.04 K/UL (ref 0–0.5)
IMM GRANULOCYTES NFR BLD AUTO: 0.7 % (ref 0–5)
KETONES UR QL STRIP.AUTO: 15 MG/DL
LDLC SERPL CALC-MCNC: 61 MG/DL (ref 0–100)
LEUKOCYTE ESTERASE UR QL STRIP.AUTO: NEGATIVE
LYMPHOCYTES # BLD: 1.87 K/UL (ref 0.5–4.6)
LYMPHOCYTES NFR BLD: 34 % (ref 13–44)
MCH RBC QN AUTO: 31.3 PG (ref 26.1–32.9)
MCHC RBC AUTO-ENTMCNC: 33.4 G/DL (ref 31.4–35)
MCV RBC AUTO: 93.8 FL (ref 82–102)
MICROALBUMIN UR-MCNC: <1.2 MG/DL (ref 0–20)
MICROALBUMIN/CREAT UR-RTO: NORMAL MG/G (ref 0–30)
MONOCYTES # BLD: 0.61 K/UL (ref 0.1–1.3)
MONOCYTES NFR BLD: 11.1 % (ref 4–12)
NEUTS SEG # BLD: 2.85 K/UL (ref 1.7–8.2)
NEUTS SEG NFR BLD: 51.8 % (ref 43–78)
NITRITE UR QL STRIP.AUTO: NEGATIVE
NRBC # BLD: 0 K/UL (ref 0–0.2)
PH UR STRIP: 5.5 (ref 5–9)
PLATELET # BLD AUTO: 155 K/UL (ref 150–450)
PMV BLD AUTO: 10.9 FL (ref 9.4–12.3)
POTASSIUM SERPL-SCNC: 4.9 MMOL/L (ref 3.5–5.1)
PROT SERPL-MCNC: 6.3 G/DL (ref 6.3–8.2)
PROT UR STRIP-MCNC: NEGATIVE MG/DL
RBC # BLD AUTO: 5.01 M/UL (ref 4.23–5.6)
SODIUM SERPL-SCNC: 141 MMOL/L (ref 136–145)
SP GR UR REFRACTOMETRY: >1.035 (ref 1–1.02)
T4 FREE SERPL-MCNC: 1.1 NG/DL (ref 0.9–1.7)
TRIGL SERPL-MCNC: 85 MG/DL (ref 0–150)
TSH, 3RD GENERATION: 2.06 UIU/ML (ref 0.27–4.2)
UROBILINOGEN UR QL STRIP.AUTO: 0.2 EU/DL (ref 0.2–1)
VLDLC SERPL CALC-MCNC: 17 MG/DL (ref 6–23)
WBC # BLD AUTO: 5.5 K/UL (ref 4.3–11.1)

## 2025-06-26 ENCOUNTER — RESULTS FOLLOW-UP (OUTPATIENT)
Dept: INTERNAL MEDICINE CLINIC | Facility: CLINIC | Age: 75
End: 2025-06-26

## 2025-07-01 ENCOUNTER — OFFICE VISIT (OUTPATIENT)
Dept: INTERNAL MEDICINE CLINIC | Facility: CLINIC | Age: 75
End: 2025-07-01

## 2025-07-01 VITALS
BODY MASS INDEX: 43.4 KG/M2 | DIASTOLIC BLOOD PRESSURE: 72 MMHG | OXYGEN SATURATION: 93 % | HEIGHT: 71 IN | SYSTOLIC BLOOD PRESSURE: 130 MMHG | HEART RATE: 115 BPM | WEIGHT: 310 LBS | TEMPERATURE: 97.7 F

## 2025-07-01 DIAGNOSIS — E11.9 CONTROLLED TYPE 2 DIABETES MELLITUS WITHOUT COMPLICATION, WITHOUT LONG-TERM CURRENT USE OF INSULIN (HCC): ICD-10-CM

## 2025-07-01 DIAGNOSIS — D68.51 FACTOR 5 LEIDEN MUTATION, HETEROZYGOUS: ICD-10-CM

## 2025-07-01 DIAGNOSIS — M17.0 PRIMARY OSTEOARTHRITIS OF BOTH KNEES: ICD-10-CM

## 2025-07-01 DIAGNOSIS — Z86.718 HISTORY OF DVT (DEEP VEIN THROMBOSIS): ICD-10-CM

## 2025-07-01 DIAGNOSIS — J30.1 SEASONAL ALLERGIC RHINITIS DUE TO POLLEN: ICD-10-CM

## 2025-07-01 DIAGNOSIS — E66.813 CLASS 3 SEVERE OBESITY DUE TO EXCESS CALORIES WITH SERIOUS COMORBIDITY AND BODY MASS INDEX (BMI) OF 40.0 TO 44.9 IN ADULT (HCC): ICD-10-CM

## 2025-07-01 DIAGNOSIS — Z00.00 MEDICARE ANNUAL WELLNESS VISIT, SUBSEQUENT: Primary | ICD-10-CM

## 2025-07-01 DIAGNOSIS — I10 PRIMARY HYPERTENSION: ICD-10-CM

## 2025-07-01 DIAGNOSIS — E78.2 MIXED HYPERLIPIDEMIA: ICD-10-CM

## 2025-07-01 DIAGNOSIS — Z85.46 HISTORY OF PROSTATE CANCER: ICD-10-CM

## 2025-07-01 RX ORDER — CELECOXIB 100 MG/1
100 CAPSULE ORAL 2 TIMES DAILY
Qty: 180 CAPSULE | Refills: 3 | Status: SHIPPED | OUTPATIENT
Start: 2025-07-01

## 2025-07-01 RX ORDER — MONTELUKAST SODIUM 10 MG/1
TABLET ORAL
Qty: 90 TABLET | Refills: 3 | Status: SHIPPED | OUTPATIENT
Start: 2025-07-01

## 2025-07-01 RX ORDER — EZETIMIBE 10 MG/1
TABLET ORAL
Qty: 90 TABLET | Refills: 3 | Status: SHIPPED | OUTPATIENT
Start: 2025-07-01

## 2025-07-01 RX ORDER — DAPAGLIFLOZIN 10 MG/1
TABLET, FILM COATED ORAL
Qty: 90 TABLET | Refills: 3 | Status: SHIPPED | OUTPATIENT
Start: 2025-07-01

## 2025-07-01 RX ORDER — ATORVASTATIN CALCIUM 40 MG/1
TABLET, FILM COATED ORAL
Qty: 90 TABLET | Refills: 3 | Status: SHIPPED | OUTPATIENT
Start: 2025-07-01

## 2025-07-01 RX ORDER — FEXOFENADINE HCL 180 MG/1
180 TABLET ORAL DAILY
Qty: 90 TABLET | Refills: 3 | Status: SHIPPED | OUTPATIENT
Start: 2025-07-01

## 2025-07-01 RX ORDER — TAMSULOSIN HYDROCHLORIDE 0.4 MG/1
0.4 CAPSULE ORAL DAILY
Qty: 90 CAPSULE | Refills: 1 | Status: SHIPPED | OUTPATIENT
Start: 2025-07-01

## 2025-07-01 RX ORDER — FLASH GLUCOSE SENSOR
KIT MISCELLANEOUS
Qty: 12 EACH | Refills: 3 | Status: SHIPPED | OUTPATIENT
Start: 2025-07-01

## 2025-07-01 RX ORDER — VALSARTAN 160 MG/1
160 TABLET ORAL DAILY
Qty: 90 TABLET | Refills: 3 | Status: SHIPPED | OUTPATIENT
Start: 2025-07-01

## 2025-07-01 RX ORDER — METFORMIN HYDROCHLORIDE 500 MG/1
1000 TABLET, EXTENDED RELEASE ORAL
Qty: 180 TABLET | Refills: 1 | Status: SHIPPED | OUTPATIENT
Start: 2025-07-01

## 2025-07-01 SDOH — ECONOMIC STABILITY: FOOD INSECURITY: WITHIN THE PAST 12 MONTHS, THE FOOD YOU BOUGHT JUST DIDN'T LAST AND YOU DIDN'T HAVE MONEY TO GET MORE.: NEVER TRUE

## 2025-07-01 SDOH — ECONOMIC STABILITY: FOOD INSECURITY: WITHIN THE PAST 12 MONTHS, YOU WORRIED THAT YOUR FOOD WOULD RUN OUT BEFORE YOU GOT MONEY TO BUY MORE.: NEVER TRUE

## 2025-07-01 ASSESSMENT — ENCOUNTER SYMPTOMS
BLOOD IN STOOL: 0
RHINORRHEA: 0
ABDOMINAL PAIN: 0
COUGH: 0
SHORTNESS OF BREATH: 0

## 2025-07-01 ASSESSMENT — PATIENT HEALTH QUESTIONNAIRE - PHQ9
SUM OF ALL RESPONSES TO PHQ QUESTIONS 1-9: 1
8. MOVING OR SPEAKING SO SLOWLY THAT OTHER PEOPLE COULD HAVE NOTICED. OR THE OPPOSITE, BEING SO FIGETY OR RESTLESS THAT YOU HAVE BEEN MOVING AROUND A LOT MORE THAN USUAL: NOT AT ALL
SUM OF ALL RESPONSES TO PHQ QUESTIONS 1-9: 1
4. FEELING TIRED OR HAVING LITTLE ENERGY: SEVERAL DAYS
10. IF YOU CHECKED OFF ANY PROBLEMS, HOW DIFFICULT HAVE THESE PROBLEMS MADE IT FOR YOU TO DO YOUR WORK, TAKE CARE OF THINGS AT HOME, OR GET ALONG WITH OTHER PEOPLE: NOT DIFFICULT AT ALL
9. THOUGHTS THAT YOU WOULD BE BETTER OFF DEAD, OR OF HURTING YOURSELF: NOT AT ALL
SUM OF ALL RESPONSES TO PHQ QUESTIONS 1-9: 1
6. FEELING BAD ABOUT YOURSELF - OR THAT YOU ARE A FAILURE OR HAVE LET YOURSELF OR YOUR FAMILY DOWN: NOT AT ALL
SUM OF ALL RESPONSES TO PHQ QUESTIONS 1-9: 1
5. POOR APPETITE OR OVEREATING: NOT AT ALL
2. FEELING DOWN, DEPRESSED OR HOPELESS: NOT AT ALL
3. TROUBLE FALLING OR STAYING ASLEEP: NOT AT ALL
1. LITTLE INTEREST OR PLEASURE IN DOING THINGS: NOT AT ALL
7. TROUBLE CONCENTRATING ON THINGS, SUCH AS READING THE NEWSPAPER OR WATCHING TELEVISION: NOT AT ALL

## 2025-07-01 ASSESSMENT — LIFESTYLE VARIABLES
HOW OFTEN DO YOU HAVE A DRINK CONTAINING ALCOHOL: NEVER
HOW MANY STANDARD DRINKS CONTAINING ALCOHOL DO YOU HAVE ON A TYPICAL DAY: PATIENT DOES NOT DRINK

## (undated) DEVICE — SUTURE VCRL SZ 0 L27IN ABSRB UD L26MM CT-2 1/2 CIR J270H

## (undated) DEVICE — PENROSE DRAIN 12" X 1/4: Brand: CARDINAL HEALTH

## (undated) DEVICE — AMD ANTIMICROBIAL GAUZE SPONGES,12 PLY USP TYPE VII, 0.2% POLYHEXAMETHYLENE BIGUANIDE HCI (PHMB): Brand: CURITY

## (undated) DEVICE — SOLUTION IV 1000ML 0.9% SOD CHL

## (undated) DEVICE — Device

## (undated) DEVICE — SUTURE VCRL SZ 3-0 L27IN ABSRB UD L26MM SH 1/2 CIR J416H

## (undated) DEVICE — 2000CC GUARDIAN II: Brand: GUARDIAN

## (undated) DEVICE — SURGICAL PROCEDURE PACK BASIC ST FRANCIS

## (undated) DEVICE — NEEDLE HYPO 21GA L1.5IN INTRAMUSCULAR S STL LATCH BVL UP

## (undated) DEVICE — ABDOMINAL PAD: Brand: DERMACEA

## (undated) DEVICE — TRAY PREP DRY W/ PREM GLV 2 APPL 6 SPNG 2 UNDPD 1 OVERWRAP

## (undated) DEVICE — (D)PREP SKN CHLRAPRP APPL 26ML -- CONVERT TO ITEM 371833

## (undated) DEVICE — SUTURE ETHLN SZ 2-0 L18IN NONABSORBABLE BLK L19MM PS-2 PRIM 593H

## (undated) DEVICE — CONTAINER SPEC FRMLN 120ML --

## (undated) DEVICE — DRAPE,TOP,102X53,STERILE: Brand: MEDLINE

## (undated) DEVICE — CARDINAL HEALTH FLEXIBLE LIGHT HANDLE COVER: Brand: CARDINAL HEALTH

## (undated) DEVICE — KERLIX BANDAGE ROLL: Brand: KERLIX

## (undated) DEVICE — SHEET, DRAPE, SPLIT, STERILE: Brand: MEDLINE

## (undated) DEVICE — REM POLYHESIVE ADULT PATIENT RETURN ELECTRODE: Brand: VALLEYLAB

## (undated) DEVICE — GAUZE,SPONGE,4"X4",16PLY,STRL,LF,10/TRAY: Brand: MEDLINE